# Patient Record
Sex: FEMALE | Race: OTHER | NOT HISPANIC OR LATINO | URBAN - METROPOLITAN AREA
[De-identification: names, ages, dates, MRNs, and addresses within clinical notes are randomized per-mention and may not be internally consistent; named-entity substitution may affect disease eponyms.]

---

## 2017-03-27 ENCOUNTER — EMERGENCY (EMERGENCY)
Facility: HOSPITAL | Age: 37
LOS: 0 days | Discharge: HOME | End: 2017-03-27
Admitting: INTERNAL MEDICINE

## 2017-06-27 DIAGNOSIS — M54.9 DORSALGIA, UNSPECIFIED: ICD-10-CM

## 2017-06-27 DIAGNOSIS — S09.90XA UNSPECIFIED INJURY OF HEAD, INITIAL ENCOUNTER: ICD-10-CM
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
## 2017-06-27 DIAGNOSIS — R10.12 LEFT UPPER QUADRANT PAIN: ICD-10-CM

## 2017-06-27 DIAGNOSIS — Z88.0 ALLERGY STATUS TO PENICILLIN: ICD-10-CM
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
## 2017-06-27 DIAGNOSIS — R42 DIZZINESS AND GIDDINESS: ICD-10-CM

## 2017-07-18 ENCOUNTER — EMERGENCY (EMERGENCY)
Facility: HOSPITAL | Age: 37
LOS: 0 days | Discharge: HOME | End: 2017-07-18
Admitting: INTERNAL MEDICINE

## 2017-07-18 DIAGNOSIS — R06.02 SHORTNESS OF BREATH: ICD-10-CM

## 2017-07-18 DIAGNOSIS — R53.83 OTHER FATIGUE: ICD-10-CM

## 2017-07-18 DIAGNOSIS — Z88.0 ALLERGY STATUS TO PENICILLIN: ICD-10-CM

## 2017-07-18 DIAGNOSIS — R14.0 ABDOMINAL DISTENSION (GASEOUS): ICD-10-CM

## 2017-09-27 ENCOUNTER — EMERGENCY (EMERGENCY)
Facility: HOSPITAL | Age: 37
LOS: 0 days | Discharge: HOME | End: 2017-09-27

## 2017-09-27 DIAGNOSIS — Z23 ENCOUNTER FOR IMMUNIZATION: ICD-10-CM
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
## 2017-09-27 DIAGNOSIS — T23.211A BURN OF SECOND DEGREE OF RIGHT THUMB (NAIL), INITIAL ENCOUNTER: ICD-10-CM
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
## 2017-09-27 DIAGNOSIS — Z88.0 ALLERGY STATUS TO PENICILLIN: ICD-10-CM

## 2017-09-27 DIAGNOSIS — T21.12XA BURN OF FIRST DEGREE OF ABDOMINAL WALL, INITIAL ENCOUNTER: ICD-10-CM
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
## 2017-09-27 DIAGNOSIS — T31.0 BURNS INVOLVING LESS THAN 10% OF BODY SURFACE: ICD-10-CM

## 2017-09-27 DIAGNOSIS — T24.112A BURN OF FIRST DEGREE OF LEFT THIGH, INITIAL ENCOUNTER: ICD-10-CM

## 2017-10-03 ENCOUNTER — APPOINTMENT (OUTPATIENT)
Age: 37
End: 2017-10-03

## 2017-10-03 ENCOUNTER — OUTPATIENT (OUTPATIENT)
Dept: OUTPATIENT SERVICES | Facility: HOSPITAL | Age: 37
LOS: 1 days | Discharge: HOME | End: 2017-10-03

## 2017-10-03 DIAGNOSIS — T23.211A BURN OF SECOND DEGREE OF RIGHT THUMB (NAIL), INITIAL ENCOUNTER: ICD-10-CM

## 2017-10-03 DIAGNOSIS — T24.212A BURN OF SECOND DEGREE OF LEFT THIGH, INITIAL ENCOUNTER: ICD-10-CM

## 2017-10-03 DIAGNOSIS — T21.22XA BURN OF SECOND DEGREE OF ABDOMINAL WALL, INITIAL ENCOUNTER: ICD-10-CM

## 2017-10-03 PROBLEM — Z00.00 ENCOUNTER FOR PREVENTIVE HEALTH EXAMINATION: Status: ACTIVE | Noted: 2017-10-03

## 2017-10-10 ENCOUNTER — APPOINTMENT (OUTPATIENT)
Age: 37
End: 2017-10-10

## 2017-10-10 DIAGNOSIS — T23.211A BURN OF SECOND DEGREE OF RIGHT THUMB (NAIL), INITIAL ENCOUNTER: ICD-10-CM

## 2017-10-10 DIAGNOSIS — T21.22XA BURN OF SECOND DEGREE OF ABDOMINAL WALL, INITIAL ENCOUNTER: ICD-10-CM
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
## 2017-10-12 ENCOUNTER — OUTPATIENT (OUTPATIENT)
Dept: OUTPATIENT SERVICES | Facility: HOSPITAL | Age: 37
LOS: 1 days | Discharge: HOME | End: 2017-10-12

## 2017-10-12 ENCOUNTER — APPOINTMENT (OUTPATIENT)
Age: 37
End: 2017-10-12

## 2017-10-24 DIAGNOSIS — T23.211D BURN OF SECOND DEGREE OF RIGHT THUMB (NAIL), SUBSEQUENT ENCOUNTER: ICD-10-CM

## 2017-10-24 DIAGNOSIS — T24.212D BURN OF SECOND DEGREE OF LEFT THIGH, SUBSEQUENT ENCOUNTER: ICD-10-CM

## 2017-10-24 DIAGNOSIS — T21.22XD BURN OF SECOND DEGREE OF ABDOMINAL WALL, SUBSEQUENT ENCOUNTER: ICD-10-CM
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
## 2018-07-22 ENCOUNTER — EMERGENCY (EMERGENCY)
Facility: HOSPITAL | Age: 38
LOS: 0 days | Discharge: HOME | End: 2018-07-22
Admitting: PHYSICIAN ASSISTANT

## 2018-07-22 VITALS
HEART RATE: 86 BPM | RESPIRATION RATE: 18 BRPM | TEMPERATURE: 99 F | DIASTOLIC BLOOD PRESSURE: 85 MMHG | SYSTOLIC BLOOD PRESSURE: 135 MMHG | OXYGEN SATURATION: 100 % | WEIGHT: 212.97 LBS
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
## 2018-07-22 DIAGNOSIS — Z88.0 ALLERGY STATUS TO PENICILLIN: ICD-10-CM
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
## 2018-07-22 DIAGNOSIS — T78.40XA ALLERGY, UNSPECIFIED, INITIAL ENCOUNTER: ICD-10-CM
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
## 2018-07-22 RX ORDER — DIPHENHYDRAMINE HCL 50 MG
2 CAPSULE ORAL
Qty: 6 | Refills: 0
Start: 2018-07-22 | End: 2018-07-24

## 2018-07-22 RX ORDER — FAMOTIDINE 10 MG/ML
40 INJECTION INTRAVENOUS
Qty: 0 | Refills: 0 | Status: DISCONTINUED | OUTPATIENT
Start: 2018-07-22 | End: 2018-07-22

## 2018-07-22 RX ORDER — FAMOTIDINE 10 MG/ML
1 INJECTION INTRAVENOUS
Qty: 4 | Refills: 0
Start: 2018-07-22 | End: 2018-07-25

## 2018-07-22 RX ORDER — DEXAMETHASONE 0.5 MG/5ML
10 ELIXIR ORAL ONCE
Qty: 0 | Refills: 0 | Status: COMPLETED | OUTPATIENT
Start: 2018-07-22 | End: 2018-07-22

## 2018-07-22 RX ORDER — DIPHENHYDRAMINE HCL 50 MG
50 CAPSULE ORAL EVERY 6 HOURS
Qty: 0 | Refills: 0 | Status: DISCONTINUED | OUTPATIENT
Start: 2018-07-22 | End: 2018-07-22

## 2018-07-22 RX ADMIN — FAMOTIDINE 40 MILLIGRAM(S): 10 INJECTION INTRAVENOUS at 22:21

## 2018-07-22 RX ADMIN — Medication 10 MILLIGRAM(S): at 22:20

## 2018-07-22 RX ADMIN — Medication 50 MILLIGRAM(S): at 22:22

## 2018-07-22 NOTE — ED PROVIDER NOTE - MEDICAL DECISION MAKING DETAILS
patient with rash likely allergic in nature no airway involvement close follow up with derm and allergist as an outpatient

## 2018-07-22 NOTE — ED ADULT NURSE NOTE - CAS EDN DISCHARGE ASSESSMENT
Alert and oriented to person, place and time/No adverse reaction to first time med in ED/Patient baseline mental status/Awake

## 2018-07-22 NOTE — ED PROVIDER NOTE - NS ED ROS FT
Review of Systems  Constitutional: (-) fever  Eyes/ENT: (-) blurry vision, (-) epistaxis  Cardiovascular: (-) chest pain, (-) syncope  Respiratory: (-) cough, (-) shortness of breath  Gastrointestinal: (-) vomiting, (-) diarrhea  Musculoskeletal: (-) neck pain, (-) back pain, (-) joint pain  Integumentary: (+) rash, (-) edema  Neurological: (-) headache, (-) altered mental status  Allergic/Immunologic: (+) pruritus

## 2018-07-22 NOTE — ED ADULT NURSE NOTE - CHPI ED SYMPTOMS NEG
no difficulty swallowing/no shortness of breath/no vomiting/no swelling of face, tongue/no throat itching/no difficulty breathing/no wheezing

## 2018-07-22 NOTE — ED PROVIDER NOTE - PHYSICAL EXAMINATION
Gen: Alert, NAD, well appearing  Head: NC, AT, PERRL, EOMI, normal lids/conjunctiva  ENT: normal hearing, patent oropharynx without erythema/exudate, uvula midline  Neck: +supple, no tenderness/meningismus/JVD, +Trachea midline  Pulm: Bilateral BS, normal resp effort, no wheeze/stridor/retractions  CV: RRR, no M/R/G, +dist pulses  Abd: soft, NT/ND  Skin: +erythematous patch like non vesicular rash to generalized body  Neuro: AAOx3

## 2018-07-22 NOTE — ED PROVIDER NOTE - OBJECTIVE STATEMENT
Patient is a 37-years old F presents to the ED for evaluation of rash to body since yesterday, no sick contact, no new medication, no fever/chills, no recent travel, no abx, no new soaps, no shortness of breath, no cp, no throat involvement.

## 2018-07-22 NOTE — ED ADULT NURSE NOTE - OBJECTIVE STATEMENT
Pt c/o generalized pruritic rash and hives since last night. (+) hx allergic reaction to PCN. Pt denies any exposure to penicillin or to any new meds, food or soap?lotion. Denies sob, denies tongue swelling. No wheezing. Pt took Allegra last night and states pruritus was relieved but rash/hives still persists. Denies fever/chills.

## 2018-07-24 ENCOUNTER — EMERGENCY (EMERGENCY)
Facility: HOSPITAL | Age: 38
LOS: 0 days | Discharge: HOME | End: 2018-07-24
Attending: EMERGENCY MEDICINE | Admitting: EMERGENCY MEDICINE

## 2018-07-24 VITALS
RESPIRATION RATE: 20 BRPM | DIASTOLIC BLOOD PRESSURE: 69 MMHG | HEART RATE: 83 BPM | TEMPERATURE: 97 F | OXYGEN SATURATION: 98 % | SYSTOLIC BLOOD PRESSURE: 131 MMHG | WEIGHT: 210.98 LBS | HEIGHT: 66 IN

## 2018-07-24 VITALS
TEMPERATURE: 98 F | RESPIRATION RATE: 20 BRPM | OXYGEN SATURATION: 99 % | SYSTOLIC BLOOD PRESSURE: 116 MMHG | HEART RATE: 99 BPM | DIASTOLIC BLOOD PRESSURE: 53 MMHG

## 2018-07-24 DIAGNOSIS — L50.9 URTICARIA, UNSPECIFIED: ICD-10-CM

## 2018-07-24 DIAGNOSIS — Z88.0 ALLERGY STATUS TO PENICILLIN: ICD-10-CM

## 2018-07-24 DIAGNOSIS — L50.0 ALLERGIC URTICARIA: ICD-10-CM

## 2018-07-24 DIAGNOSIS — Z79.899 OTHER LONG TERM (CURRENT) DRUG THERAPY: ICD-10-CM

## 2018-07-24 RX ORDER — FAMOTIDINE 10 MG/ML
1 INJECTION INTRAVENOUS
Qty: 8 | Refills: 0
Start: 2018-07-24 | End: 2018-07-27

## 2018-07-24 RX ORDER — FAMOTIDINE 10 MG/ML
20 INJECTION INTRAVENOUS ONCE
Qty: 0 | Refills: 0 | Status: COMPLETED | OUTPATIENT
Start: 2018-07-24 | End: 2018-07-24

## 2018-07-24 RX ORDER — SODIUM CHLORIDE 9 MG/ML
1000 INJECTION INTRAMUSCULAR; INTRAVENOUS; SUBCUTANEOUS ONCE
Qty: 0 | Refills: 0 | Status: COMPLETED | OUTPATIENT
Start: 2018-07-24 | End: 2018-07-24

## 2018-07-24 RX ORDER — EPINEPHRINE 0.3 MG/.3ML
0.3 INJECTION INTRAMUSCULAR; SUBCUTANEOUS ONCE
Qty: 0 | Refills: 0 | Status: COMPLETED | OUTPATIENT
Start: 2018-07-24 | End: 2018-07-24

## 2018-07-24 RX ORDER — DIPHENHYDRAMINE HCL 50 MG
1 CAPSULE ORAL
Qty: 16 | Refills: 0
Start: 2018-07-24 | End: 2018-07-27

## 2018-07-24 RX ORDER — SODIUM CHLORIDE 9 MG/ML
3 INJECTION INTRAMUSCULAR; INTRAVENOUS; SUBCUTANEOUS EVERY 8 HOURS
Qty: 0 | Refills: 0 | Status: DISCONTINUED | OUTPATIENT
Start: 2018-07-24 | End: 2018-07-24

## 2018-07-24 RX ORDER — EPINEPHRINE 0.3 MG/.3ML
0.3 INJECTION INTRAMUSCULAR; SUBCUTANEOUS
Qty: 0.6 | Refills: 0
Start: 2018-07-24 | End: 2018-07-24

## 2018-07-24 RX ADMIN — FAMOTIDINE 100 MILLIGRAM(S): 10 INJECTION INTRAVENOUS at 02:32

## 2018-07-24 RX ADMIN — EPINEPHRINE 0.3 MILLIGRAM(S): 0.3 INJECTION INTRAMUSCULAR; SUBCUTANEOUS at 03:55

## 2018-07-24 RX ADMIN — SODIUM CHLORIDE 3 MILLILITER(S): 9 INJECTION INTRAMUSCULAR; INTRAVENOUS; SUBCUTANEOUS at 02:28

## 2018-07-24 RX ADMIN — SODIUM CHLORIDE 1000 MILLILITER(S): 9 INJECTION INTRAMUSCULAR; INTRAVENOUS; SUBCUTANEOUS at 02:32

## 2018-07-24 RX ADMIN — Medication 125 MILLIGRAM(S): at 02:32

## 2018-07-24 NOTE — ED ADULT NURSE NOTE - CHIEF COMPLAINT QUOTE
pt states seen 2 days ago at hospital for hives and given benadryl and pepcid and seen again tonight at [facility] 4 hours ago but still has hives

## 2018-07-24 NOTE — ED PROVIDER NOTE - PHYSICAL EXAMINATION
CONST: Well appearing in NAD  EYES: Sclera and conjunctiva clear.  ENT: + mild erythema of pharynx, airway patent, normal architecture of posterior pharynx, uvula midline, No nasal discharge. TM's clear B/L without drainage.   NECK: Non-tender, supple  CARD: Normal S1 S2; Normal rate and rhythm  RESP: Equal BS B/L, No wheezes, rhonchi or rales. No distress  GI: Soft, non-tender, non-distended.  MS: Normal ROM in all extremities. no TTP of extremities, pedal pulses 2+ bilaterally  SKIN: diffuse maculopapular rash of torso and extremities,   NEURO: A&Ox3, Strength 5/5 with no sensory deficits. Steady gait

## 2018-07-24 NOTE — ED PROVIDER NOTE - NS ED ROS FT
CONST: No fever, chills or body aches  EYES: No pain, redness, drainage or visual changes.  ENT: No ear pain or discharge, nasal discharge or congestion. No sore throat  CARD: No chest pain, palpitations  RESP: No SOB, cough  GI: + nausea. No abdominal pain, V/D  MS: No joint pain, back pain or extremity pain/injury  SKIN: + hives   NEURO: No headache, dizziness, paresthesias

## 2018-07-24 NOTE — ED PROVIDER NOTE - MEDICAL DECISION MAKING DETAILS
pt with  hives  throat swelling sensation and nausea - epi given  with resolution of symptoms-  pt has allergist appointment

## 2018-07-24 NOTE — ED PROVIDER NOTE - PROGRESS NOTE DETAILS
Pt feels improved after epi.  rash improved. pt stable.  requesting to go home.  Improvement with epi.  Will d/c on benadryl, pepcid and send home with epi pen.  discussed indications for use of epipen.  All questions were answered and return precautions discussed.  Will follow up with PMD.  NO further concerns. Pt/family understand and agree with tx plan. I was directly involved in the care of this patient. PA Fellow Florentin note/plan reviewed and agreed.

## 2018-07-24 NOTE — ED PROVIDER NOTE - OBJECTIVE STATEMENT
37 y.o female with hx of PE presents to the ED for evaluation of hives x 3 days.  Pt developed allergic reaction with development of abdomen and back 37 y.o female with hx of PE presents to the ED for evaluation of hives x 3 days.  Pt developed allergic reaction with development of hives on abdomen and back which progressed to extremities.  Was seen at [facility] and given decadron, Pepcid and Benadryl with relief of her sxs.  The following day rash worsened prompting visit to [facility] where pt was given benadryl IM and "5 pills," with relief of sxs.  Today pt noticed worsening of hives and nausea prompting visit to the ED.  Took 50 mg Benadryl prior to arrival.  Denies vomiting, sore throat, dyspnea, chest pain, extremity pain, abdominal pain.

## 2018-07-24 NOTE — ED ADULT TRIAGE NOTE - CHIEF COMPLAINT QUOTE
pt states seen 2 days ago at hospital for hives and given benadryl and pepcid and seen again tonight at [facility] 4 hours ago but still has hives

## 2018-07-26 NOTE — ED ADULT NURSE REASSESSMENT NOTE - NS ED NURSE REASSESS RESPONSE TO CARE
patient states "ready to go home"/feeling much better chlorhexidine/Adherence to aseptic technique: hand hygiene prior to donning barriers (gown, gloves), don cap and mask, sterile drape over patient

## 2018-12-25 ENCOUNTER — TRANSCRIPTION ENCOUNTER (OUTPATIENT)
Age: 38
End: 2018-12-25

## 2019-02-28 ENCOUNTER — EMERGENCY (EMERGENCY)
Facility: HOSPITAL | Age: 39
LOS: 0 days | Discharge: HOME | End: 2019-02-28
Attending: EMERGENCY MEDICINE | Admitting: EMERGENCY MEDICINE

## 2019-02-28 ENCOUNTER — TRANSCRIPTION ENCOUNTER (OUTPATIENT)
Age: 39
End: 2019-02-28

## 2019-02-28 VITALS
HEIGHT: 66 IN | SYSTOLIC BLOOD PRESSURE: 119 MMHG | WEIGHT: 218.04 LBS | OXYGEN SATURATION: 100 % | RESPIRATION RATE: 16 BRPM | HEART RATE: 80 BPM | DIASTOLIC BLOOD PRESSURE: 59 MMHG | TEMPERATURE: 98 F

## 2019-02-28 VITALS
HEART RATE: 74 BPM | TEMPERATURE: 97 F | SYSTOLIC BLOOD PRESSURE: 126 MMHG | DIASTOLIC BLOOD PRESSURE: 80 MMHG | OXYGEN SATURATION: 100 % | RESPIRATION RATE: 20 BRPM

## 2019-02-28 DIAGNOSIS — R10.9 UNSPECIFIED ABDOMINAL PAIN: ICD-10-CM

## 2019-02-28 DIAGNOSIS — Z90.49 ACQUIRED ABSENCE OF OTHER SPECIFIED PARTS OF DIGESTIVE TRACT: Chronic | ICD-10-CM

## 2019-02-28 DIAGNOSIS — Z98.891 HISTORY OF UTERINE SCAR FROM PREVIOUS SURGERY: Chronic | ICD-10-CM

## 2019-02-28 DIAGNOSIS — Z88.0 ALLERGY STATUS TO PENICILLIN: ICD-10-CM

## 2019-02-28 DIAGNOSIS — R10.84 GENERALIZED ABDOMINAL PAIN: ICD-10-CM

## 2019-02-28 DIAGNOSIS — R11.2 NAUSEA WITH VOMITING, UNSPECIFIED: ICD-10-CM

## 2019-02-28 DIAGNOSIS — R19.7 DIARRHEA, UNSPECIFIED: ICD-10-CM

## 2019-02-28 DIAGNOSIS — Z90.49 ACQUIRED ABSENCE OF OTHER SPECIFIED PARTS OF DIGESTIVE TRACT: ICD-10-CM

## 2019-02-28 DIAGNOSIS — Z79.899 OTHER LONG TERM (CURRENT) DRUG THERAPY: ICD-10-CM

## 2019-02-28 DIAGNOSIS — Z98.891 HISTORY OF UTERINE SCAR FROM PREVIOUS SURGERY: ICD-10-CM

## 2019-02-28 LAB
ALBUMIN SERPL ELPH-MCNC: 4.6 G/DL — SIGNIFICANT CHANGE UP (ref 3.5–5.2)
ALP SERPL-CCNC: 73 U/L — SIGNIFICANT CHANGE UP (ref 30–115)
ALT FLD-CCNC: 14 U/L — SIGNIFICANT CHANGE UP (ref 0–41)
ANION GAP SERPL CALC-SCNC: 14 MMOL/L — SIGNIFICANT CHANGE UP (ref 7–14)
APPEARANCE UR: CLEAR — SIGNIFICANT CHANGE UP
AST SERPL-CCNC: 18 U/L — SIGNIFICANT CHANGE UP (ref 0–41)
BACTERIA # UR AUTO: SIGNIFICANT CHANGE UP /HPF
BASOPHILS # BLD AUTO: 0.01 K/UL — SIGNIFICANT CHANGE UP (ref 0–0.2)
BASOPHILS NFR BLD AUTO: 0.2 % — SIGNIFICANT CHANGE UP (ref 0–1)
BILIRUB SERPL-MCNC: 0.4 MG/DL — SIGNIFICANT CHANGE UP (ref 0.2–1.2)
BILIRUB UR-MCNC: NEGATIVE — SIGNIFICANT CHANGE UP
BUN SERPL-MCNC: 18 MG/DL — SIGNIFICANT CHANGE UP (ref 10–20)
CALCIUM SERPL-MCNC: 9.3 MG/DL — SIGNIFICANT CHANGE UP (ref 8.5–10.1)
CHLORIDE SERPL-SCNC: 103 MMOL/L — SIGNIFICANT CHANGE UP (ref 98–110)
CO2 SERPL-SCNC: 22 MMOL/L — SIGNIFICANT CHANGE UP (ref 17–32)
COLOR SPEC: YELLOW — SIGNIFICANT CHANGE UP
CREAT SERPL-MCNC: 0.7 MG/DL — SIGNIFICANT CHANGE UP (ref 0.7–1.5)
DIFF PNL FLD: ABNORMAL
EOSINOPHIL # BLD AUTO: 0.12 K/UL — SIGNIFICANT CHANGE UP (ref 0–0.7)
EOSINOPHIL NFR BLD AUTO: 1.9 % — SIGNIFICANT CHANGE UP (ref 0–8)
EPI CELLS # UR: ABNORMAL /HPF
GLUCOSE SERPL-MCNC: 87 MG/DL — SIGNIFICANT CHANGE UP (ref 70–99)
GLUCOSE UR QL: NEGATIVE MG/DL — SIGNIFICANT CHANGE UP
HCT VFR BLD CALC: 42 % — SIGNIFICANT CHANGE UP (ref 37–47)
HGB BLD-MCNC: 13.7 G/DL — SIGNIFICANT CHANGE UP (ref 12–16)
IMM GRANULOCYTES NFR BLD AUTO: 0.5 % — HIGH (ref 0.1–0.3)
KETONES UR-MCNC: NEGATIVE — SIGNIFICANT CHANGE UP
LEUKOCYTE ESTERASE UR-ACNC: NEGATIVE — SIGNIFICANT CHANGE UP
LIDOCAIN IGE QN: 18 U/L — SIGNIFICANT CHANGE UP (ref 7–60)
LYMPHOCYTES # BLD AUTO: 1.09 K/UL — LOW (ref 1.2–3.4)
LYMPHOCYTES # BLD AUTO: 17.7 % — LOW (ref 20.5–51.1)
MAGNESIUM SERPL-MCNC: 1.8 MG/DL — SIGNIFICANT CHANGE UP (ref 1.8–2.4)
MCHC RBC-ENTMCNC: 29.5 PG — SIGNIFICANT CHANGE UP (ref 27–31)
MCHC RBC-ENTMCNC: 32.6 G/DL — SIGNIFICANT CHANGE UP (ref 32–37)
MCV RBC AUTO: 90.5 FL — SIGNIFICANT CHANGE UP (ref 81–99)
MONOCYTES # BLD AUTO: 0.41 K/UL — SIGNIFICANT CHANGE UP (ref 0.1–0.6)
MONOCYTES NFR BLD AUTO: 6.7 % — SIGNIFICANT CHANGE UP (ref 1.7–9.3)
NEUTROPHILS # BLD AUTO: 4.5 K/UL — SIGNIFICANT CHANGE UP (ref 1.4–6.5)
NEUTROPHILS NFR BLD AUTO: 73 % — SIGNIFICANT CHANGE UP (ref 42.2–75.2)
NITRITE UR-MCNC: NEGATIVE — SIGNIFICANT CHANGE UP
NRBC # BLD: 0 /100 WBCS — SIGNIFICANT CHANGE UP (ref 0–0)
PH UR: 5.5 — SIGNIFICANT CHANGE UP (ref 5–8)
PLATELET # BLD AUTO: 260 K/UL — SIGNIFICANT CHANGE UP (ref 130–400)
POTASSIUM SERPL-MCNC: 4.2 MMOL/L — SIGNIFICANT CHANGE UP (ref 3.5–5)
POTASSIUM SERPL-SCNC: 4.2 MMOL/L — SIGNIFICANT CHANGE UP (ref 3.5–5)
PROT SERPL-MCNC: 7.6 G/DL — SIGNIFICANT CHANGE UP (ref 6–8)
PROT UR-MCNC: NEGATIVE MG/DL — SIGNIFICANT CHANGE UP
RBC # BLD: 4.64 M/UL — SIGNIFICANT CHANGE UP (ref 4.2–5.4)
RBC # FLD: 12.7 % — SIGNIFICANT CHANGE UP (ref 11.5–14.5)
RBC CASTS # UR COMP ASSIST: ABNORMAL /HPF
SODIUM SERPL-SCNC: 139 MMOL/L — SIGNIFICANT CHANGE UP (ref 135–146)
SP GR SPEC: 1.02 — SIGNIFICANT CHANGE UP (ref 1.01–1.03)
UROBILINOGEN FLD QL: 0.2 MG/DL — SIGNIFICANT CHANGE UP (ref 0.2–0.2)
WBC # BLD: 6.16 K/UL — SIGNIFICANT CHANGE UP (ref 4.8–10.8)
WBC # FLD AUTO: 6.16 K/UL — SIGNIFICANT CHANGE UP (ref 4.8–10.8)
WBC UR QL: SIGNIFICANT CHANGE UP /HPF

## 2019-02-28 RX ORDER — SODIUM CHLORIDE 9 MG/ML
1000 INJECTION INTRAMUSCULAR; INTRAVENOUS; SUBCUTANEOUS ONCE
Qty: 0 | Refills: 0 | Status: COMPLETED | OUTPATIENT
Start: 2019-02-28 | End: 2019-02-28

## 2019-02-28 RX ORDER — FAMOTIDINE 10 MG/ML
20 INJECTION INTRAVENOUS ONCE
Qty: 0 | Refills: 0 | Status: COMPLETED | OUTPATIENT
Start: 2019-02-28 | End: 2019-02-28

## 2019-02-28 RX ADMIN — Medication 20 MILLIGRAM(S): at 20:08

## 2019-02-28 RX ADMIN — FAMOTIDINE 20 MILLIGRAM(S): 10 INJECTION INTRAVENOUS at 18:01

## 2019-02-28 RX ADMIN — SODIUM CHLORIDE 2000 MILLILITER(S): 9 INJECTION INTRAMUSCULAR; INTRAVENOUS; SUBCUTANEOUS at 17:53

## 2019-02-28 NOTE — ED PROVIDER NOTE - ATTENDING CONTRIBUTION TO CARE
I personally evaluated the patient. I reviewed the Resident’s or Physician Assistant’s note (as assigned above), and agree with the findings and plan except as documented in my note.  A 39 y/o f, reports not on any medications, sig. history of diverticulitis, does not have gi physician, his sharp in nature, was general but now localized to LUQ, associated with nausea and vomiting x1 today, nbnb, and diarrhea ~ 4 episodes, non-bloody. son is sick with similar symptoms. lmp February 9th. had flu swab done at urgent care that was negative and was given zofran there which helped her nausea. No fever, chills, cp,  pleuritic cp, sob, palpitations, diaphoresis, cough, ha/lh/dizziness, numbness/tingling, neck pain/ stiffness, constipation, melena/brbpr, urinary symptoms, trauma, weakness, edema, calf pain/swelling/erythema, recent travel or rash.  Vital Signs: I have reviewed the initial vital signs. Constitutional: WDWN in nad. Sitting on stretcher speaking full sentences. Integumentary: No rash. ENT: MMM NECK: Supple, non-tender, no meningeal signs. Cardiovascular: RRR, radial pulses 2/4 b/l. No JVD. Respiratory: BS present b/l, ctabl, no wheezing or crackles, good resp effort and excursion, good air exchange, no accessory muscle use, no stridor. Gastrointestinal: BS present throughout all 4 quadrants, soft, nd, tenderness to palpation to LUQ, no rebound tenderness or guarding, no cvat. (-) Gifford's. Musculoskeletal: FROM, no edema, no calf pain/swelling/erythema. Neurologic: AAOx3, motor 5/5 and sensation intact throughout upper and lowe ext, CN II-XII intact, No facial droop or slurring of speech. No focal deficits.

## 2019-02-28 NOTE — ED PROVIDER NOTE - CARE PLAN
Assessment and plan of treatment:	Plan: Labs, ivf, pt took zofran pta at urgent care, pepcid, u preg, urine, imaging, reassess. Principal Discharge DX:	Abdominal pain  Assessment and plan of treatment:	Plan: Labs, ivf, pt took zofran pta at urgent care, pepcid, u preg, urine, imaging, reassess. Principal Discharge DX:	Abdominal pain  Assessment and plan of treatment:	Plan: Labs, ivf, pt took zofran pta at urgent care, pepcid, u preg, urine, imaging, reassess.  Secondary Diagnosis:	Nausea and vomiting  Secondary Diagnosis:	Diarrhea

## 2019-02-28 NOTE — ED PROVIDER NOTE - CARE PROVIDER_API CALL
Sukhwinder Hobbs)  Gastroenterology  [address] [place] [address]  Phone: [phone]  Fax: [phone]  Follow Up Time:

## 2019-02-28 NOTE — ED PROVIDER NOTE - RESPIRATORY NEGATIVE STATEMENT, MLM
Mri being done today downstairs  
Noted. Will follow up with results.  
no chest pain, no cough, and no shortness of breath.

## 2019-02-28 NOTE — ED PROVIDER NOTE - CLINICAL SUMMARY MEDICAL DECISION MAKING FREE TEXT BOX
pt aware of all lab and imaging, copy given, feeling better, no symptoms at this time, no vomiting or diarrhea in ed, no current abd pain, aware of signs and symptoms to return for, reports will follow up with pmd and gi as discussed. bentyl prescription given as well.

## 2019-02-28 NOTE — ED PROVIDER NOTE - OBJECTIVE STATEMENT
39 y/o female with hx of diverticulitis one month ago and was taking cipro and flagyl presents with 2 day hx of abdominal pain and diarrhea. patient denies fever,chills. patient son with similar symptoms. patient denies any dysuria, back pain, vomiting . no chest pain, sob

## 2019-02-28 NOTE — ED ADULT NURSE NOTE - NSIMPLEMENTINTERV_GEN_ALL_ED
Implemented All Universal Safety Interventions:  [place] to call system. Call bell, personal items and telephone within reach. Instruct patient to call for assistance. Room bathroom lighting operational. Non-slip footwear when patient is off stretcher. Physically safe environment: no spills, clutter or unnecessary equipment. Stretcher in lowest position, wheels locked, appropriate side rails in place.

## 2019-02-28 NOTE — ED PROVIDER NOTE - PROGRESS NOTE DETAILS
spoke with patient and family regarding results of diagnostics . will rx bentyl and refer to GI for followup pt reports feeling much better, tolerated po, abd re exam: soft ntnd, no r/g, no cvat, states nos ymptoms at thist laura, all results reviewed and understood, aware of signs and symptoms to return for, follow up with pmd and gi, as well as proper hydration reports, will follow up.

## 2019-03-01 PROBLEM — I26.99 OTHER PULMONARY EMBOLISM WITHOUT ACUTE COR PULMONALE: Chronic | Status: ACTIVE | Noted: 2018-07-24

## 2019-03-01 LAB
CULTURE RESULTS: SIGNIFICANT CHANGE UP
SPECIMEN SOURCE: SIGNIFICANT CHANGE UP

## 2019-06-17 ENCOUNTER — TRANSCRIPTION ENCOUNTER (OUTPATIENT)
Age: 39
End: 2019-06-17

## 2019-06-28 ENCOUNTER — EMERGENCY (EMERGENCY)
Facility: HOSPITAL | Age: 39
LOS: 0 days | Discharge: HOME | End: 2019-06-28
Attending: EMERGENCY MEDICINE | Admitting: EMERGENCY MEDICINE
Payer: MEDICAID

## 2019-06-28 VITALS
SYSTOLIC BLOOD PRESSURE: 122 MMHG | TEMPERATURE: 98 F | OXYGEN SATURATION: 100 % | HEIGHT: 66 IN | RESPIRATION RATE: 18 BRPM | HEART RATE: 72 BPM | WEIGHT: 216.05 LBS | DIASTOLIC BLOOD PRESSURE: 82 MMHG

## 2019-06-28 VITALS
TEMPERATURE: 97 F | SYSTOLIC BLOOD PRESSURE: 106 MMHG | HEART RATE: 60 BPM | DIASTOLIC BLOOD PRESSURE: 62 MMHG | RESPIRATION RATE: 18 BRPM | OXYGEN SATURATION: 100 %

## 2019-06-28 DIAGNOSIS — Z98.891 HISTORY OF UTERINE SCAR FROM PREVIOUS SURGERY: Chronic | ICD-10-CM

## 2019-06-28 DIAGNOSIS — Z90.49 ACQUIRED ABSENCE OF OTHER SPECIFIED PARTS OF DIGESTIVE TRACT: Chronic | ICD-10-CM

## 2019-06-28 DIAGNOSIS — M79.604 PAIN IN RIGHT LEG: ICD-10-CM

## 2019-06-28 DIAGNOSIS — R06.02 SHORTNESS OF BREATH: ICD-10-CM

## 2019-06-28 DIAGNOSIS — Z86.711 PERSONAL HISTORY OF PULMONARY EMBOLISM: ICD-10-CM

## 2019-06-28 DIAGNOSIS — R60.0 LOCALIZED EDEMA: ICD-10-CM

## 2019-06-28 DIAGNOSIS — Z86.718 PERSONAL HISTORY OF OTHER VENOUS THROMBOSIS AND EMBOLISM: ICD-10-CM

## 2019-06-28 DIAGNOSIS — R07.9 CHEST PAIN, UNSPECIFIED: ICD-10-CM

## 2019-06-28 PROBLEM — K57.92 DIVERTICULITIS OF INTESTINE, PART UNSPECIFIED, WITHOUT PERFORATION OR ABSCESS WITHOUT BLEEDING: Chronic | Status: ACTIVE | Noted: 2019-02-28

## 2019-06-28 LAB
ALBUMIN SERPL ELPH-MCNC: 4.3 G/DL — SIGNIFICANT CHANGE UP (ref 3.5–5.2)
ALP SERPL-CCNC: 64 U/L — SIGNIFICANT CHANGE UP (ref 30–115)
ALT FLD-CCNC: 13 U/L — SIGNIFICANT CHANGE UP (ref 0–41)
ANION GAP SERPL CALC-SCNC: 12 MMOL/L — SIGNIFICANT CHANGE UP (ref 7–14)
APPEARANCE UR: CLEAR — SIGNIFICANT CHANGE UP
AST SERPL-CCNC: 16 U/L — SIGNIFICANT CHANGE UP (ref 0–41)
BILIRUB SERPL-MCNC: 0.2 MG/DL — SIGNIFICANT CHANGE UP (ref 0.2–1.2)
BILIRUB UR-MCNC: NEGATIVE — SIGNIFICANT CHANGE UP
BUN SERPL-MCNC: 11 MG/DL — SIGNIFICANT CHANGE UP (ref 10–20)
CALCIUM SERPL-MCNC: 9.2 MG/DL — SIGNIFICANT CHANGE UP (ref 8.5–10.1)
CHLORIDE SERPL-SCNC: 101 MMOL/L — SIGNIFICANT CHANGE UP (ref 98–110)
CO2 SERPL-SCNC: 26 MMOL/L — SIGNIFICANT CHANGE UP (ref 17–32)
COLOR SPEC: YELLOW — SIGNIFICANT CHANGE UP
CREAT SERPL-MCNC: 0.7 MG/DL — SIGNIFICANT CHANGE UP (ref 0.7–1.5)
D DIMER BLD IA.RAPID-MCNC: 337 NG/ML DDU — HIGH (ref 0–230)
DIFF PNL FLD: NEGATIVE — SIGNIFICANT CHANGE UP
GLUCOSE SERPL-MCNC: 89 MG/DL — SIGNIFICANT CHANGE UP (ref 70–99)
GLUCOSE UR QL: NEGATIVE MG/DL — SIGNIFICANT CHANGE UP
HCG SERPL QL: NEGATIVE — SIGNIFICANT CHANGE UP
HCT VFR BLD CALC: 37.7 % — SIGNIFICANT CHANGE UP (ref 37–47)
HGB BLD-MCNC: 12.4 G/DL — SIGNIFICANT CHANGE UP (ref 12–16)
KETONES UR-MCNC: NEGATIVE — SIGNIFICANT CHANGE UP
LEUKOCYTE ESTERASE UR-ACNC: NEGATIVE — SIGNIFICANT CHANGE UP
MCHC RBC-ENTMCNC: 29.5 PG — SIGNIFICANT CHANGE UP (ref 27–31)
MCHC RBC-ENTMCNC: 32.9 G/DL — SIGNIFICANT CHANGE UP (ref 32–37)
MCV RBC AUTO: 89.8 FL — SIGNIFICANT CHANGE UP (ref 81–99)
NITRITE UR-MCNC: NEGATIVE — SIGNIFICANT CHANGE UP
NRBC # BLD: 0 /100 WBCS — SIGNIFICANT CHANGE UP (ref 0–0)
NT-PROBNP SERPL-SCNC: 20 PG/ML — SIGNIFICANT CHANGE UP (ref 0–300)
PH UR: 5.5 — SIGNIFICANT CHANGE UP (ref 5–8)
PLATELET # BLD AUTO: 200 K/UL — SIGNIFICANT CHANGE UP (ref 130–400)
POTASSIUM SERPL-MCNC: 3.9 MMOL/L — SIGNIFICANT CHANGE UP (ref 3.5–5)
POTASSIUM SERPL-SCNC: 3.9 MMOL/L — SIGNIFICANT CHANGE UP (ref 3.5–5)
PROT SERPL-MCNC: 7.2 G/DL — SIGNIFICANT CHANGE UP (ref 6–8)
PROT UR-MCNC: NEGATIVE MG/DL — SIGNIFICANT CHANGE UP
RBC # BLD: 4.2 M/UL — SIGNIFICANT CHANGE UP (ref 4.2–5.4)
RBC # FLD: 12.5 % — SIGNIFICANT CHANGE UP (ref 11.5–14.5)
SODIUM SERPL-SCNC: 139 MMOL/L — SIGNIFICANT CHANGE UP (ref 135–146)
SP GR SPEC: 1.01 — SIGNIFICANT CHANGE UP (ref 1.01–1.03)
TROPONIN T SERPL-MCNC: <0.01 NG/ML — SIGNIFICANT CHANGE UP
UROBILINOGEN FLD QL: 0.2 MG/DL — SIGNIFICANT CHANGE UP (ref 0.2–0.2)
WBC # BLD: 6.84 K/UL — SIGNIFICANT CHANGE UP (ref 4.8–10.8)
WBC # FLD AUTO: 6.84 K/UL — SIGNIFICANT CHANGE UP (ref 4.8–10.8)

## 2019-06-28 PROCEDURE — 71046 X-RAY EXAM CHEST 2 VIEWS: CPT | Mod: 26

## 2019-06-28 PROCEDURE — 73610 X-RAY EXAM OF ANKLE: CPT | Mod: 26,LT

## 2019-06-28 PROCEDURE — 99285 EMERGENCY DEPT VISIT HI MDM: CPT

## 2019-06-28 PROCEDURE — 71275 CT ANGIOGRAPHY CHEST: CPT | Mod: 26

## 2019-06-28 PROCEDURE — 93970 EXTREMITY STUDY: CPT | Mod: 26

## 2019-06-28 RX ORDER — KETOROLAC TROMETHAMINE 30 MG/ML
15 SYRINGE (ML) INJECTION ONCE
Refills: 0 | Status: DISCONTINUED | OUTPATIENT
Start: 2019-06-28 | End: 2019-06-28

## 2019-06-28 RX ADMIN — Medication 15 MILLIGRAM(S): at 07:00

## 2019-06-28 RX ADMIN — Medication 15 MILLIGRAM(S): at 05:53

## 2019-06-28 RX ADMIN — Medication 15 MILLIGRAM(S): at 09:15

## 2019-06-28 RX ADMIN — Medication 15 MILLIGRAM(S): at 09:00

## 2019-06-28 NOTE — ED CDU PROVIDER INITIAL DAY NOTE - PHYSICAL EXAMINATION
CONSTITUTIONAL: Well-developed; well-nourished; in no acute distress.   SKIN: warm, dry  HEAD: Normocephalic; atraumatic.  EYES: PERRL, EOMI, no conjunctival erythema  ENT: No nasal discharge; airway clear.  NECK: Supple; non tender.  CARD: S1, S2 normal; no murmurs, gallops, or rubs. Regular rate and rhythm.   RESP: No wheezes, rales or rhonchi.  ABD: soft ntnd  EXT: Normal ROM.  No clubbing, cyanosis. Mild edema over L. medial malleolus with no tenderness.  LYMPH: No acute cervical adenopathy.  NEURO: Alert, oriented, grossly unremarkable  PSYCH: Cooperative, appropriate.

## 2019-06-28 NOTE — ED PROVIDER NOTE - CLINICAL SUMMARY MEDICAL DECISION MAKING FREE TEXT BOX
Pt pending DVT study. Discussed empiric a/c and outptatient dvt vs observation for am dvt study. amenable to observation,.

## 2019-06-28 NOTE — ED CDU PROVIDER INITIAL DAY NOTE - NS ED ROS FT
Eyes:  No visual changes, eye pain or discharge.  ENMT:  No hearing changes, pain, no sore throat or runny nose, no difficulty swallowing  Cardiac:  No edema. No chest pain with exertion. + chest pressure, SOB, edema  Respiratory:  No cough or respiratory distress. No hemoptysis. No history of asthma or RAD.  GI:  No nausea, vomiting, diarrhea or abdominal pain.  :  No dysuria, frequency or burning.  MS:  No myalgia, muscle weakness, joint pain or back pain.  Neuro:  No headache or weakness.  No LOC.  Skin:  No skin rash.   Endocrine: No history of thyroid disease or diabetes.

## 2019-06-28 NOTE — ED CDU PROVIDER DISPOSITION NOTE - CARE PROVIDERS DIRECT ADDRESSES
,DirectAddress_Unknown,[email or web address]

## 2019-06-28 NOTE — ED CDU PROVIDER DISPOSITION NOTE - CLINICAL COURSE
Pt came in with PLE swelling. labs/CT/US done and reviewed. No PE/DVT. WIll discharge with PMD follow up.

## 2019-06-28 NOTE — ED CDU PROVIDER DISPOSITION NOTE - PROVIDER TOKENS
PROVIDER:[TOKEN:[[id number]],FOLLOWUP:[1-3 Days]],PROVIDER:[TOKEN:[[id number]],FOLLOWUP:[1-3 Days]]

## 2019-06-28 NOTE — ED CDU PROVIDER DISPOSITION NOTE - NSFOLLOWUPINSTRUCTIONS_ED_ALL_ED_FT
Follow up with your primary care doctor and lung doctor in 1-2 days    Shortness of Breath    WHAT YOU NEED TO KNOW:    Shortness of breath is a feeling that you cannot get enough air when you breathe in. You may have this feeling only during activity, or all the time. Your symptoms can range from mild to severe. Shortness of breath may be a sign of a serious health condition that needs immediate care.    DISCHARGE INSTRUCTIONS:    Return to the emergency department if:     Your signs and symptoms are the same or worse within 24 hours of treatment.       The skin over your ribs or on your neck sinks in when you breathe.       You feel confused or dizzy.    Contact your healthcare provider if:     You have new or worsening symptoms.      You have questions or concerns about your condition or care.    Medicines:     Medicines may be used to treat the cause of your symptoms. You may need medicine to treat a bacterial infection or reduce anxiety. Other medicines may be used to open your airway, reduce swelling, or remove extra fluid. If you have a heart condition, you may need medicine to help your heart beat more strongly or regularly.      Take your medicine as directed. Contact your healthcare provider if you think your medicine is not helping or if you have side effects. Tell him or her if you are allergic to any medicine. Keep a list of the medicines, vitamins, and herbs you take. Include the amounts, and when and why you take them. Bring the list or the pill bottles to follow-up visits. Carry your medicine list with you in case of an emergency.    Manage shortness of breath:     Create an action plan. You and your healthcare provider can work together to create a plan for how to handle shortness of breath. The plan can include daily activities, treatment changes, and what to do if you have severe breathing problems.      Lean forward on your elbows when you sit. This helps your lungs expand and may make it easier to breathe.      Use pursed-lip breathing any time you feel short of breath. Breathe in through your nose and then slowly breathe out through your mouth with your lips slightly puckered. It should take you twice as long to breathe out as it did to breathe in.Breathe in Breathe out           Do not smoke. Nicotine and other chemicals in cigarettes and cigars can cause lung damage and make shortness of breath worse. Ask your healthcare provider for information if you currently smoke and need help to quit. E-cigarettes or smokeless tobacco still contain nicotine. Talk to your healthcare provider before you use these products.      Reach or maintain a healthy weight. Your healthcare provider can help you create a safe weight loss plan if you are overweight.      Exercise as directed. Exercise can help your lungs work more easily. Exercise can also help you lose weight if needed. Try to get at least 30 minutes of exercise most days of the week.    Follow up with your healthcare provider or specialist as directed: Write down your questions so you remember to ask them during your visits.       © Copyright [facility] 2019 All illustrations and images included in CareNotes are the copyrighted property of [employer].D.A[employer]M., Inc[employer] or [employer].

## 2019-06-28 NOTE — ED CDU PROVIDER INITIAL DAY NOTE - PROGRESS NOTE DETAILS
Pt feeling better states still having some pain in chest but states that the pain improved after tordol last night requesting additional dose. Results d/w patient and family and copies of results provided.  Pt instructed to return if any worsening symptoms or concerns.  Discussed with patient follow up and care plan. They verbalize understanding all discussed and all questions answered..

## 2019-06-28 NOTE — ED PROVIDER NOTE - PHYSICAL EXAMINATION
CONSTITUTIONAL: Well-developed; well-nourished; anxious appearing.  SKIN: warm, dry  HEAD: Normocephalic; atraumatic.  EYES: PERRL, EOMI, no conjunctival erythema  ENT: No nasal discharge; airway clear.  NECK: Supple; non tender.  CARD: S1, S2 normal; no murmurs, gallops, or rubs. Regular rate and rhythm. 2+ radial/pedal pulses B/L.   RESP: No wheezes, rales or rhonchi.  ABD: soft ntnd  EXT: Normal ROM.  No clubbing, cyanosis. Mild edema over L. medial malleolus with no tenderness.  LYMPH: No acute cervical adenopathy.  NEURO: Alert, oriented, grossly unremarkable  PSYCH: Cooperative, appropriate.

## 2019-06-28 NOTE — ED PROVIDER NOTE - NS ED ROS FT
Eyes:  No visual changes, eye pain or discharge.  ENMT:  No hearing changes, pain, no sore throat or runny nose, no difficulty swallowing  Cardiac:  No chest pain with exertion. + chest pressure, SOB, edema  Respiratory:  No cough or respiratory distress. No hemoptysis. No history of asthma or RAD.  GI:  No nausea, vomiting, diarrhea or abdominal pain.  :  No dysuria, frequency or burning.  MS:  No myalgia, muscle weakness, joint pain or back pain.  Neuro:  No headache or weakness.  No LOC.  Skin:  No skin rash.   Endocrine: No history of thyroid disease or diabetes.

## 2019-06-28 NOTE — ED CDU PROVIDER INITIAL DAY NOTE - OBJECTIVE STATEMENT
Pt with hx of unprovoked DVT/PE, not on A/C, presenting with right lower ext pain and swelling a/w shortness of breath/ burning chest pain radiating up through esophagus. and nonproductive cough x approx 10 days. with acute worsening over last three days. no fever. no chills. no exertional dyspnea or angina. Pt states she is sp course of abx which helped with cough.

## 2019-06-28 NOTE — ED PROVIDER NOTE - ATTENDING CONTRIBUTION TO CARE
hx of unprovoked dvt/pe, not on a/c presenting with right lower ext pain and swelling a/w shortness of breath/ burning chest pain radiating up through esophagus. and nonproductive cough x approx 10 days. with acute worsening over last three days. no fever. no chills. no exertional dyspnea or angina. Pt states she is sp course of abx which helped with cough.  VITAL SIGNS: noted  CONSTITUTIONAL: Well-developed; well-nourished; in no acute distress  HEAD: Normocephalic; atraumatic  EYES: conjunctiva and sclera clear  ENT: No nasal discharge; airway clear. MMM  NECK: Supple; non tender. No anterior cervical lymphadenopathy noted  CARD: Regular rate and rhythm  RESP: CTAB/L, no wheezes, rales or rhonchi  ABD: Normal bowel sounds; soft; non-distended; non-tender; No CVAT  EXT: Normal ROM. No calf tenderness or edema. Distal pulses intact  NEURO: Alert, oriented. Grossly unremarkable. No focal deficits  SKIN: Skin exam is warm and dry, no acute rash  MS: No midline spinal tenderness   Will eval for pna, pe,dvt, labs, xr, ekg, ddimer, analgesia reassess.

## 2019-06-28 NOTE — ED CDU PROVIDER DISPOSITION NOTE - CARE PROVIDER_API CALL
José Jones)  Critical Care Medicine; Internal Medicine; Pulmonary Disease; Sleep Medicine  [address] [place] [address]  Phone: [phone]  Fax: [phone]  Follow Up Time: 1-3 Days    Erlin Velázquez)  Cardiovascular Disease; Internal Medicine  [address] [place] [address]  Phone: [phone]  Fax: [phone]  Follow Up Time: 1-3 Days

## 2020-05-10 ENCOUNTER — EMERGENCY (EMERGENCY)
Facility: HOSPITAL | Age: 40
LOS: 0 days | Discharge: HOME | End: 2020-05-11
Attending: EMERGENCY MEDICINE | Admitting: EMERGENCY MEDICINE
Payer: MEDICAID

## 2020-05-10 VITALS
DIASTOLIC BLOOD PRESSURE: 56 MMHG | RESPIRATION RATE: 18 BRPM | HEART RATE: 82 BPM | OXYGEN SATURATION: 100 % | SYSTOLIC BLOOD PRESSURE: 126 MMHG | WEIGHT: 225.97 LBS | TEMPERATURE: 98 F

## 2020-05-10 DIAGNOSIS — Z90.49 ACQUIRED ABSENCE OF OTHER SPECIFIED PARTS OF DIGESTIVE TRACT: Chronic | ICD-10-CM

## 2020-05-10 DIAGNOSIS — Z90.49 ACQUIRED ABSENCE OF OTHER SPECIFIED PARTS OF DIGESTIVE TRACT: ICD-10-CM

## 2020-05-10 DIAGNOSIS — Z98.891 HISTORY OF UTERINE SCAR FROM PREVIOUS SURGERY: Chronic | ICD-10-CM

## 2020-05-10 DIAGNOSIS — R10.9 UNSPECIFIED ABDOMINAL PAIN: ICD-10-CM

## 2020-05-10 DIAGNOSIS — R10.32 LEFT LOWER QUADRANT PAIN: ICD-10-CM

## 2020-05-10 DIAGNOSIS — Z88.0 ALLERGY STATUS TO PENICILLIN: ICD-10-CM

## 2020-05-10 LAB
ALBUMIN SERPL ELPH-MCNC: 4.8 G/DL — SIGNIFICANT CHANGE UP (ref 3.5–5.2)
ALP SERPL-CCNC: 66 U/L — SIGNIFICANT CHANGE UP (ref 30–115)
ALT FLD-CCNC: 18 U/L — SIGNIFICANT CHANGE UP (ref 0–41)
ANION GAP SERPL CALC-SCNC: 12 MMOL/L — SIGNIFICANT CHANGE UP (ref 7–14)
APPEARANCE UR: CLEAR — SIGNIFICANT CHANGE UP
AST SERPL-CCNC: 17 U/L — SIGNIFICANT CHANGE UP (ref 0–41)
BASOPHILS # BLD AUTO: 0.05 K/UL — SIGNIFICANT CHANGE UP (ref 0–0.2)
BASOPHILS NFR BLD AUTO: 0.6 % — SIGNIFICANT CHANGE UP (ref 0–1)
BILIRUB DIRECT SERPL-MCNC: <0.2 MG/DL — SIGNIFICANT CHANGE UP (ref 0–0.2)
BILIRUB INDIRECT FLD-MCNC: < 0.2 MG/DL — SIGNIFICANT CHANGE UP (ref 0.2–1.2)
BILIRUB SERPL-MCNC: <0.2 MG/DL — SIGNIFICANT CHANGE UP (ref 0.2–1.2)
BILIRUB UR-MCNC: NEGATIVE — SIGNIFICANT CHANGE UP
BUN SERPL-MCNC: 18 MG/DL — SIGNIFICANT CHANGE UP (ref 10–20)
CALCIUM SERPL-MCNC: 9.9 MG/DL — SIGNIFICANT CHANGE UP (ref 8.5–10.1)
CHLORIDE SERPL-SCNC: 103 MMOL/L — SIGNIFICANT CHANGE UP (ref 98–110)
CO2 SERPL-SCNC: 26 MMOL/L — SIGNIFICANT CHANGE UP (ref 17–32)
COLOR SPEC: YELLOW — SIGNIFICANT CHANGE UP
CREAT SERPL-MCNC: 0.7 MG/DL — SIGNIFICANT CHANGE UP (ref 0.7–1.5)
DIFF PNL FLD: NEGATIVE — SIGNIFICANT CHANGE UP
EOSINOPHIL # BLD AUTO: 0.15 K/UL — SIGNIFICANT CHANGE UP (ref 0–0.7)
EOSINOPHIL NFR BLD AUTO: 1.9 % — SIGNIFICANT CHANGE UP (ref 0–8)
GLUCOSE SERPL-MCNC: 95 MG/DL — SIGNIFICANT CHANGE UP (ref 70–99)
GLUCOSE UR QL: NEGATIVE MG/DL — SIGNIFICANT CHANGE UP
HCG SERPL QL: NEGATIVE — SIGNIFICANT CHANGE UP
HCT VFR BLD CALC: 38.3 % — SIGNIFICANT CHANGE UP (ref 37–47)
HGB BLD-MCNC: 12.3 G/DL — SIGNIFICANT CHANGE UP (ref 12–16)
IMM GRANULOCYTES NFR BLD AUTO: 0.6 % — HIGH (ref 0.1–0.3)
KETONES UR-MCNC: NEGATIVE — SIGNIFICANT CHANGE UP
LACTATE SERPL-SCNC: 1.1 MMOL/L — SIGNIFICANT CHANGE UP (ref 0.7–2)
LEUKOCYTE ESTERASE UR-ACNC: NEGATIVE — SIGNIFICANT CHANGE UP
LIDOCAIN IGE QN: 25 U/L — SIGNIFICANT CHANGE UP (ref 7–60)
LYMPHOCYTES # BLD AUTO: 2.97 K/UL — SIGNIFICANT CHANGE UP (ref 1.2–3.4)
LYMPHOCYTES # BLD AUTO: 37.2 % — SIGNIFICANT CHANGE UP (ref 20.5–51.1)
MCHC RBC-ENTMCNC: 29.2 PG — SIGNIFICANT CHANGE UP (ref 27–31)
MCHC RBC-ENTMCNC: 32.1 G/DL — SIGNIFICANT CHANGE UP (ref 32–37)
MCV RBC AUTO: 91 FL — SIGNIFICANT CHANGE UP (ref 81–99)
MONOCYTES # BLD AUTO: 0.65 K/UL — HIGH (ref 0.1–0.6)
MONOCYTES NFR BLD AUTO: 8.1 % — SIGNIFICANT CHANGE UP (ref 1.7–9.3)
NEUTROPHILS # BLD AUTO: 4.11 K/UL — SIGNIFICANT CHANGE UP (ref 1.4–6.5)
NEUTROPHILS NFR BLD AUTO: 51.6 % — SIGNIFICANT CHANGE UP (ref 42.2–75.2)
NITRITE UR-MCNC: NEGATIVE — SIGNIFICANT CHANGE UP
NRBC # BLD: 0 /100 WBCS — SIGNIFICANT CHANGE UP (ref 0–0)
PH UR: 5.5 — SIGNIFICANT CHANGE UP (ref 5–8)
PLATELET # BLD AUTO: 317 K/UL — SIGNIFICANT CHANGE UP (ref 130–400)
POTASSIUM SERPL-MCNC: 4.2 MMOL/L — SIGNIFICANT CHANGE UP (ref 3.5–5)
POTASSIUM SERPL-SCNC: 4.2 MMOL/L — SIGNIFICANT CHANGE UP (ref 3.5–5)
PROT SERPL-MCNC: 7.3 G/DL — SIGNIFICANT CHANGE UP (ref 6–8)
PROT UR-MCNC: NEGATIVE MG/DL — SIGNIFICANT CHANGE UP
RBC # BLD: 4.21 M/UL — SIGNIFICANT CHANGE UP (ref 4.2–5.4)
RBC # FLD: 13.1 % — SIGNIFICANT CHANGE UP (ref 11.5–14.5)
SODIUM SERPL-SCNC: 141 MMOL/L — SIGNIFICANT CHANGE UP (ref 135–146)
SP GR SPEC: 1.02 — SIGNIFICANT CHANGE UP (ref 1.01–1.03)
UROBILINOGEN FLD QL: 0.2 MG/DL — SIGNIFICANT CHANGE UP (ref 0.2–0.2)
WBC # BLD: 7.98 K/UL — SIGNIFICANT CHANGE UP (ref 4.8–10.8)
WBC # FLD AUTO: 7.98 K/UL — SIGNIFICANT CHANGE UP (ref 4.8–10.8)

## 2020-05-10 PROCEDURE — 74177 CT ABD & PELVIS W/CONTRAST: CPT | Mod: 26

## 2020-05-10 PROCEDURE — 99285 EMERGENCY DEPT VISIT HI MDM: CPT

## 2020-05-10 RX ORDER — SODIUM CHLORIDE 9 MG/ML
1000 INJECTION INTRAMUSCULAR; INTRAVENOUS; SUBCUTANEOUS ONCE
Refills: 0 | Status: COMPLETED | OUTPATIENT
Start: 2020-05-10 | End: 2020-05-10

## 2020-05-10 RX ORDER — ONDANSETRON 8 MG/1
4 TABLET, FILM COATED ORAL ONCE
Refills: 0 | Status: COMPLETED | OUTPATIENT
Start: 2020-05-10 | End: 2020-05-10

## 2020-05-10 RX ADMIN — ONDANSETRON 4 MILLIGRAM(S): 8 TABLET, FILM COATED ORAL at 22:52

## 2020-05-10 RX ADMIN — SODIUM CHLORIDE 1000 MILLILITER(S): 9 INJECTION INTRAMUSCULAR; INTRAVENOUS; SUBCUTANEOUS at 22:52

## 2020-05-10 NOTE — ED ADULT NURSE NOTE - NSIMPLEMENTINTERV_GEN_ALL_ED
Implemented All Universal Safety Interventions:  [place] to call system. Call bell, personal items and telephone within reach. Instruct patient to call for assistance. Room bathroom lighting operational. Non-slip footwear when patient is off stretcher. Physically safe environment: no spills, clutter or unnecessary equipment. Stretcher in lowest position, wheels locked, appropriate side rails in place.

## 2020-05-11 RX ORDER — FAMOTIDINE 10 MG/ML
1 INJECTION INTRAVENOUS
Qty: 14 | Refills: 0
Start: 2020-05-11 | End: 2020-05-24

## 2020-05-11 RX ADMIN — Medication 20 MILLIGRAM(S): at 00:27

## 2020-05-11 NOTE — ED PROVIDER NOTE - PROVIDER TOKENS
PROVIDER:[TOKEN:[[id number]],FOLLOWUP:[1-3 Days]],PROVIDER:[TOKEN:[[id number]],FOLLOWUP:[1-3 Days]]

## 2020-05-11 NOTE — ED PROVIDER NOTE - ATTENDING CONTRIBUTION TO CARE
I personally evaluated the patient. I reviewed the Resident’s or Physician Assistant’s note (as assigned above), and agree with the findings and plan except as documented in my note.  Chart reviewed. H/O asthma, works in a nursing home, presents with cough for 1 week. Tested negative for Covid last week. Requests a CXR. Exam shows alert patient in no distress, HEENT NCAT, lungs clear, RR S1S2, abdomen soft Nt +BS, no CCE. I personally evaluated the patient. I reviewed the Resident’s or Physician Assistant’s note (as assigned above), and agree with the findings and plan except as documented in my note. I personally evaluated the patient. I reviewed the Resident’s or Physician Assistant’s note (as assigned above), and agree with the findings and plan except as documented in my note.  Chart reviewed. S/P appendectomy, h/o diverticulitis, presents with left sided abdominal pain for few days. No fever. Feels nauseous. Exam shows alert patient in no distress, HEENT NCAT, lungs clear, RR S1S2, abdomen soft +LLQ tenderness +BS, no rebound or guarding, no CCE.

## 2020-05-11 NOTE — ED PROVIDER NOTE - PATIENT PORTAL LINK FT
You can access the FollowMyHealth Patient Portal offered by [facility] by registering at the following website: [email or web address]. By joining [employer]’s FollowMyHealth portal, you will also be able to view your health information using other applications (apps) compatible with our system.

## 2020-05-11 NOTE — ED PROVIDER NOTE - CLINICAL SUMMARY MEDICAL DECISION MAKING FREE TEXT BOX
Labs unremarkable. UA negative. Preg negative. CT abdo no acute pathology. Given IVF, Zofran and Bentyl. Will D/C to follow up with GI.

## 2020-05-11 NOTE — ED PROVIDER NOTE - OBJECTIVE STATEMENT
40 yo F with PMHx of diverticulitis and appendectomy presents to the ED c/o moderate, sharp, non-radiating, left lower abdominal pain x 1 week. She admits to associated nausea and non-bloody diarrhea. She denies taking medication to improve symptoms. She denies other complaints. Pt denies fever, chills, vomiting, recent antibiotic use, headache, dizziness, weakness, chest pain, SOB, back pain, LOC, trauma, urinary symptoms, cough, calf pain/swelling, recent travel, recent surgery.

## 2020-05-11 NOTE — ED PROVIDER NOTE - CARE PROVIDER_API CALL
Fco Arnold  Gastroenterology  [address]  [place] [address]  Phone: [phone]  Fax: [phone]  Follow Up Time: 1-3 Days    Sukhwinder Orona)  Gastroenterology; Internal Medicine  [address] [place] [address]  Phone: [phone]  Fax: [phone]  Follow Up Time: 1-3 Days

## 2020-05-11 NOTE — ED PROVIDER NOTE - CARE PROVIDERS DIRECT ADDRESSES
,DirectAddress_Unknown,[email or web address]

## 2020-05-11 NOTE — ED PROVIDER NOTE - PHYSICAL EXAMINATION
VITAL SIGNS: I have reviewed nursing notes and confirm.  CONSTITUTIONAL: Well-developed; well-nourished; in no acute distress.  SKIN: Skin exam is warm and dry, no acute rash.  HEAD: Normocephalic; atraumatic.  EYES: Conjunctiva and sclera clear.  ENT: No nasal discharge; airway clear.   CARD: S1, S2 normal; no murmurs, gallops, or rubs. Regular rate and rhythm.  RESP: No wheezes, rales or rhonchi. Speaking in full sentences.   ABD: Normal bowel sounds; soft; non-distended; (+) LLQ TTP; No rebound or guarding. No CVA tenderness.  EXT: Normal ROM.   NEURO: Alert, oriented. Grossly unremarkable. No focal deficits.

## 2020-08-03 ENCOUNTER — EMERGENCY (EMERGENCY)
Facility: HOSPITAL | Age: 40
LOS: 0 days | Discharge: HOME | End: 2020-08-03
Attending: EMERGENCY MEDICINE | Admitting: EMERGENCY MEDICINE
Payer: MEDICAID

## 2020-08-03 VITALS
DIASTOLIC BLOOD PRESSURE: 59 MMHG | HEIGHT: 66 IN | HEART RATE: 93 BPM | TEMPERATURE: 103 F | OXYGEN SATURATION: 99 % | SYSTOLIC BLOOD PRESSURE: 117 MMHG | WEIGHT: 216.05 LBS | RESPIRATION RATE: 18 BRPM

## 2020-08-03 VITALS — DIASTOLIC BLOOD PRESSURE: 56 MMHG | TEMPERATURE: 100 F | SYSTOLIC BLOOD PRESSURE: 110 MMHG | HEART RATE: 74 BPM

## 2020-08-03 DIAGNOSIS — R21 RASH AND OTHER NONSPECIFIC SKIN ERUPTION: ICD-10-CM

## 2020-08-03 DIAGNOSIS — R50.9 FEVER, UNSPECIFIED: ICD-10-CM

## 2020-08-03 DIAGNOSIS — R31.9 HEMATURIA, UNSPECIFIED: ICD-10-CM

## 2020-08-03 DIAGNOSIS — Z90.49 ACQUIRED ABSENCE OF OTHER SPECIFIED PARTS OF DIGESTIVE TRACT: Chronic | ICD-10-CM

## 2020-08-03 DIAGNOSIS — Z98.891 HISTORY OF UTERINE SCAR FROM PREVIOUS SURGERY: Chronic | ICD-10-CM

## 2020-08-03 DIAGNOSIS — J18.9 PNEUMONIA, UNSPECIFIED ORGANISM: ICD-10-CM

## 2020-08-03 DIAGNOSIS — Z88.0 ALLERGY STATUS TO PENICILLIN: ICD-10-CM

## 2020-08-03 DIAGNOSIS — Z20.828 CONTACT WITH AND (SUSPECTED) EXPOSURE TO OTHER VIRAL COMMUNICABLE DISEASES: ICD-10-CM

## 2020-08-03 LAB
ALBUMIN SERPL ELPH-MCNC: 4.2 G/DL — SIGNIFICANT CHANGE UP (ref 3.5–5.2)
ALP SERPL-CCNC: 71 U/L — SIGNIFICANT CHANGE UP (ref 30–115)
ALT FLD-CCNC: 33 U/L — SIGNIFICANT CHANGE UP (ref 0–41)
ANION GAP SERPL CALC-SCNC: 12 MMOL/L — SIGNIFICANT CHANGE UP (ref 7–14)
APPEARANCE UR: CLEAR — SIGNIFICANT CHANGE UP
AST SERPL-CCNC: 34 U/L — SIGNIFICANT CHANGE UP (ref 0–41)
BACTERIA # UR AUTO: ABNORMAL
BASOPHILS # BLD AUTO: 0.04 K/UL — SIGNIFICANT CHANGE UP (ref 0–0.2)
BASOPHILS NFR BLD AUTO: 0.6 % — SIGNIFICANT CHANGE UP (ref 0–1)
BILIRUB SERPL-MCNC: 0.4 MG/DL — SIGNIFICANT CHANGE UP (ref 0.2–1.2)
BILIRUB UR-MCNC: NEGATIVE — SIGNIFICANT CHANGE UP
BUN SERPL-MCNC: 8 MG/DL — LOW (ref 10–20)
CALCIUM SERPL-MCNC: 9.2 MG/DL — SIGNIFICANT CHANGE UP (ref 8.5–10.1)
CHLORIDE SERPL-SCNC: 100 MMOL/L — SIGNIFICANT CHANGE UP (ref 98–110)
CO2 SERPL-SCNC: 21 MMOL/L — SIGNIFICANT CHANGE UP (ref 17–32)
COLOR SPEC: YELLOW — SIGNIFICANT CHANGE UP
CREAT SERPL-MCNC: 0.8 MG/DL — SIGNIFICANT CHANGE UP (ref 0.7–1.5)
DIFF PNL FLD: ABNORMAL
EOSINOPHIL # BLD AUTO: 0.03 K/UL — SIGNIFICANT CHANGE UP (ref 0–0.7)
EOSINOPHIL NFR BLD AUTO: 0.4 % — SIGNIFICANT CHANGE UP (ref 0–8)
EPI CELLS # UR: ABNORMAL /HPF
GLUCOSE SERPL-MCNC: 109 MG/DL — HIGH (ref 70–99)
GLUCOSE UR QL: NEGATIVE MG/DL — SIGNIFICANT CHANGE UP
HCT VFR BLD CALC: 37.2 % — SIGNIFICANT CHANGE UP (ref 37–47)
HGB BLD-MCNC: 12.3 G/DL — SIGNIFICANT CHANGE UP (ref 12–16)
IMM GRANULOCYTES NFR BLD AUTO: 0.3 % — SIGNIFICANT CHANGE UP (ref 0.1–0.3)
KETONES UR-MCNC: NEGATIVE — SIGNIFICANT CHANGE UP
LEUKOCYTE ESTERASE UR-ACNC: NEGATIVE — SIGNIFICANT CHANGE UP
LYMPHOCYTES # BLD AUTO: 1.34 K/UL — SIGNIFICANT CHANGE UP (ref 1.2–3.4)
LYMPHOCYTES # BLD AUTO: 19.3 % — LOW (ref 20.5–51.1)
MCHC RBC-ENTMCNC: 29.4 PG — SIGNIFICANT CHANGE UP (ref 27–31)
MCHC RBC-ENTMCNC: 33.1 G/DL — SIGNIFICANT CHANGE UP (ref 32–37)
MCV RBC AUTO: 89 FL — SIGNIFICANT CHANGE UP (ref 81–99)
MONOCYTES # BLD AUTO: 0.73 K/UL — HIGH (ref 0.1–0.6)
MONOCYTES NFR BLD AUTO: 10.5 % — HIGH (ref 1.7–9.3)
NEUTROPHILS # BLD AUTO: 4.77 K/UL — SIGNIFICANT CHANGE UP (ref 1.4–6.5)
NEUTROPHILS NFR BLD AUTO: 68.9 % — SIGNIFICANT CHANGE UP (ref 42.2–75.2)
NITRITE UR-MCNC: NEGATIVE — SIGNIFICANT CHANGE UP
NRBC # BLD: 0 /100 WBCS — SIGNIFICANT CHANGE UP (ref 0–0)
PH UR: 6 — SIGNIFICANT CHANGE UP (ref 5–8)
PLATELET # BLD AUTO: 205 K/UL — SIGNIFICANT CHANGE UP (ref 130–400)
POTASSIUM SERPL-MCNC: 4.2 MMOL/L — SIGNIFICANT CHANGE UP (ref 3.5–5)
POTASSIUM SERPL-SCNC: 4.2 MMOL/L — SIGNIFICANT CHANGE UP (ref 3.5–5)
PROT SERPL-MCNC: 6.9 G/DL — SIGNIFICANT CHANGE UP (ref 6–8)
PROT UR-MCNC: NEGATIVE MG/DL — SIGNIFICANT CHANGE UP
RBC # BLD: 4.18 M/UL — LOW (ref 4.2–5.4)
RBC # FLD: 12.9 % — SIGNIFICANT CHANGE UP (ref 11.5–14.5)
RBC CASTS # UR COMP ASSIST: ABNORMAL /HPF
SARS-COV-2 RNA SPEC QL NAA+PROBE: SIGNIFICANT CHANGE UP
SODIUM SERPL-SCNC: 133 MMOL/L — LOW (ref 135–146)
SP GR SPEC: 1.02 — SIGNIFICANT CHANGE UP (ref 1.01–1.03)
TROPONIN T SERPL-MCNC: <0.01 NG/ML — SIGNIFICANT CHANGE UP
UROBILINOGEN FLD QL: 0.2 MG/DL — SIGNIFICANT CHANGE UP (ref 0.2–0.2)
WBC # BLD: 6.93 K/UL — SIGNIFICANT CHANGE UP (ref 4.8–10.8)
WBC # FLD AUTO: 6.93 K/UL — SIGNIFICANT CHANGE UP (ref 4.8–10.8)
WBC UR QL: SIGNIFICANT CHANGE UP /HPF

## 2020-08-03 PROCEDURE — 74177 CT ABD & PELVIS W/CONTRAST: CPT | Mod: 26

## 2020-08-03 PROCEDURE — 99285 EMERGENCY DEPT VISIT HI MDM: CPT

## 2020-08-03 PROCEDURE — 71045 X-RAY EXAM CHEST 1 VIEW: CPT | Mod: 26

## 2020-08-03 PROCEDURE — 71275 CT ANGIOGRAPHY CHEST: CPT | Mod: 26

## 2020-08-03 PROCEDURE — 93970 EXTREMITY STUDY: CPT | Mod: 26

## 2020-08-03 RX ORDER — ACETAMINOPHEN 500 MG
975 TABLET ORAL ONCE
Refills: 0 | Status: COMPLETED | OUTPATIENT
Start: 2020-08-03 | End: 2020-08-03

## 2020-08-03 RX ORDER — CEFTRIAXONE 500 MG/1
1000 INJECTION, POWDER, FOR SOLUTION INTRAMUSCULAR; INTRAVENOUS ONCE
Refills: 0 | Status: DISCONTINUED | OUTPATIENT
Start: 2020-08-03 | End: 2020-08-03

## 2020-08-03 RX ORDER — CIPROFLOXACIN LACTATE 400MG/40ML
1 VIAL (ML) INTRAVENOUS
Qty: 5 | Refills: 0
Start: 2020-08-03 | End: 2020-08-07

## 2020-08-03 RX ORDER — KETOROLAC TROMETHAMINE 30 MG/ML
30 SYRINGE (ML) INJECTION ONCE
Refills: 0 | Status: DISCONTINUED | OUTPATIENT
Start: 2020-08-03 | End: 2020-08-03

## 2020-08-03 RX ADMIN — Medication 30 MILLIGRAM(S): at 20:44

## 2020-08-03 RX ADMIN — Medication 975 MILLIGRAM(S): at 15:42

## 2020-08-03 NOTE — ED PROVIDER NOTE - PHYSICAL EXAMINATION
CONSTITUTIONAL: Well-developed; well-nourished; in no acute distress.   SKIN: warm, dry  HEAD: Normocephalic; atraumatic.  EYES: PERRL, EOMI, no conjunctival erythema  ENT: No nasal discharge; airway clear.  NECK: Supple; non tender.  CARD: S1, S2 normal; no murmurs, gallops, or rubs. Regular rate and rhythm.   RESP: No wheezes, rales or rhonchi.  ABD: soft ntnd  EXT: Normal ROM.  No clubbing, cyanosis or edema.   LYMPH: No acute cervical adenopathy.  NEURO: Alert, oriented, grossly unremarkable  PSYCH: Cooperative, appropriate. CONSTITUTIONAL: Well-developed; well-nourished; in no acute distress.   SKIN: warm, dry mild maculopapular rash to lateral aspect of L. leg.  HEAD: Normocephalic; atraumatic.  EYES: PERRL, EOMI, no conjunctival erythema  ENT: No nasal discharge; airway clear.  NECK: Supple; non tender.  CARD: S1, S2 normal; no murmurs, gallops, or rubs. Regular rate and rhythm.   RESP: No wheezes, rales or rhonchi.  ABD: soft ntnd  EXT: Normal ROM.  No clubbing, cyanosis or edema.   LYMPH: No acute cervical adenopathy.  NEURO: Alert, oriented, grossly unremarkable  PSYCH: Cooperative, appropriate.

## 2020-08-03 NOTE — ED PROVIDER NOTE - PATIENT PORTAL LINK FT
You can access the FollowMyHealth Patient Portal offered by [facility] by registering at the following website: [email or web address]. By joining [facility]’s FollowMyHealth portal, you will also be able to view your health information using other applications (apps) compatible with our system.

## 2020-08-03 NOTE — ED PROVIDER NOTE - CLINICAL SUMMARY MEDICAL DECISION MAKING FREE TEXT BOX
pt presented with fever, uri sx's abd pain. pt found to have suspected pneumonia. no pe on ct thorax. no sig abd finding on ct. pt well appearing, ambulated with normal pulse ox. pt advised to outpt pulm f/u. also f/u for hematuria.

## 2020-08-03 NOTE — ED PROVIDER NOTE - PROGRESS NOTE DETAILS
pt in ct, reports abd pain concern. ct abd pelvis added on ATTENDING NOTE: 38 y/o F with PMHx PE, no anticoagulation and clotting disorder presents with fever x3 days, associated with sore throat, nasal congest, left side CP, left flank pain and runny nose. On exam: CON: ao x 3, HENMT: clear oropharynx,  neck supple,  CV: rrr, equal pulses b/l, RESP: cta b/l, GI:  soft, nontender, no rebound, no guarding, SKIN: no rash, MSK: (+) small circular area of redness, raised to left calf, no deformities, NEURO: no gross motor or sensory deficit Psychiatric: appropriate mood, appropriate affect. Imp: fever and multitude of URI chest and abdominal sxs. Plan to r/o PE. Pt concerned for abdominal pathology so CT added. CT showing no PE, but consolidation at right lung base. Pt to be treated for pneumonia and advised f/u as out-patient for repeat imaging and to PMD for trace amounts of blood in urine. Results d/w patient and  copies of results provided.  Pt instructed to return if any worsening symptoms or concerns.  Discussed with patient follow up and care plan. They verbalize understanding all discussed and all questions answered.. ATTENDING NOTE: 38 y/o F with PMHx PE, no anticoagulation and clotting disorder presents with fever x3 days, associated with sore throat, nasal congest, left side CP, left flank pain and runny nose. On exam: CON: ao x 3, HENMT: clear oropharynx,  neck supple,  CV: rrr, equal pulses b/l, RESP: cta b/l, GI:  soft, nontender, no rebound, no guarding, SKIN: no rash, MSK: (+) small circular area of redness, raised to left calf, no deformities, NEURO: no gross motor or sensory deficit Psychiatric: appropriate mood, appropriate affect. Imp: fever and multitude of URI chest and abdominal sxs. Plan to r/o PE. Pt concerned for abdominal pathology so CT added. CT showing no PE, but consolidation at right lung base. Pt to be treated for pneumonia and advised f/u as out-patient for repeat imaging and to PMD for trace amount of blood in urine.

## 2020-08-03 NOTE — ED PROVIDER NOTE - NSFOLLOWUPINSTRUCTIONS_ED_ALL_ED_FT
Follow up with your primary care doctor and pulmonologist 1-2 days    Pneumonia    WHAT YOU NEED TO KNOW:    Pneumonia is an infection in your lungs caused by bacteria, viruses, fungi, or parasites. You can become infected if you come in contact with someone who is sick. You can get pneumonia if you recently had surgery or needed a ventilator to help you breathe. Pneumonia can also be caused by accidentally inhaling saliva or small pieces of food. Pneumonia may cause mild symptoms, or it can be severe and life-threatening. The Lungs         DISCHARGE INSTRUCTIONS:    Return to the emergency department if:     You cough up blood.       Your heart beats more than 100 beats in 1 minute.       You are very tired, confused, and cannot think clearly.      You have chest pain or trouble breathing.       Your lips or fingernails turn gray or blue.     Contact your healthcare provider if:     Your symptoms are the same or get worse 48 hours after you start antibiotics.      Your fever is not below 99°F (37.2°C) 48 hours after you start antibiotics.       You have a fever higher than 101°F (38.3°C).       You cannot eat, or you have loss of appetite, nausea, or are vomiting.      You have questions or concerns about your condition or care.    Medicines:     Antibiotics treat pneumonia caused by bacteria.      Acetaminophen decreases pain and fever. It is available without a doctor's order. Ask how much to take and how often to take it. Follow directions. Read the labels of all other medicines you are using to see if they also contain acetaminophen, or ask your doctor or pharmacist. Acetaminophen can cause liver damage if not taken correctly. Do not use more than 4 grams (4,000 milligrams) total of acetaminophen in one day.       NSAIDs, such as ibuprofen, help decrease swelling, pain, and fever. This medicine is available with or without a doctor's order. NSAIDs can cause stomach bleeding or kidney problems in certain people. If you take blood thinner medicine, always ask your healthcare provider if NSAIDs are safe for you. Always read the medicine label and follow directions.      Take your medicine as directed. Contact your healthcare provider if you think your medicine is not helping or if you have side effects. Tell him or her if you are allergic to any medicine. Keep a list of the medicines, vitamins, and herbs you take. Include the amounts, and when and why you take them. Bring the list or the pill bottles to follow-up visits. Carry your medicine list with you in case of an emergency.    Follow up with your healthcare provider as directed: You will need to return for more tests. Write down your questions so you remember to ask them during your visits.     Manage your symptoms:     Rest as needed. Rest often throughout the day. Alternate times of activity with times of rest.      Drink liquids as directed. Ask how much liquid to drink each day and which liquids are best for you. Liquids help thin your mucus, which may make it easier for you to cough it up.       Do not smoke. Avoid secondhand smoke. Smoking increases your risk for pneumonia. Smoking also makes it harder for you to get better after you have had pneumonia. Ask your healthcare provider for information if you need help to quit smoking.       Use a cool mist humidifier. A humidifier will help increase air moisture in your home. This may make it easier for you to breathe and help decrease your cough.       Keep your head elevated. You may be able to breathe better if you lie down with the head of your bed up.     Prevent pneumonia:     Prevent the spread of germs. Wash your hands often with soap and water. Use gel hand cleanser when there is no soap and water available. Do not touch your eyes, nose, or mouth unless you have washed your hands first. Cover your mouth when you cough. Cough into a tissue or your shirtsleeve so you do not spread germs from your hands. If you are sick, stay away from others as much as possible. Handwashing           Limit alcohol. Women should limit alcohol to 1 drink a day. Men should limit alcohol to 2 drinks a day. A drink of alcohol is 12 ounces of beer, 5 ounces of wine, or 1½ ounces of liquor.      Ask about vaccines. You may need a vaccine to help prevent pneumonia. Get an influenza (flu) vaccine every year as soon as it becomes available.          © Copyright [employer] 2019 All illustrations and images included in CareNotes are the copyrighted property of [employer] or [employer].

## 2020-08-03 NOTE — ED PROVIDER NOTE - CARE PROVIDER_API CALL
José Jones)  Critical Care Medicine; Internal Medicine; Pulmonary Disease; Sleep Medicine  [address] [place] [address]  Phone: [phone]  Fax: [phone]  Follow Up Time: 1-3 Days

## 2020-08-03 NOTE — ED PROVIDER NOTE - NS ED ROS FT
Eyes:  No visual changes, eye pain or discharge.  ENMT:  No hearing changes, pain, no difficulty swallowing + sore throat, runny nose  Cardiac:  No SOB or edema. No chest pain with exertion. + chest pain  Respiratory:  No cough or respiratory distress. No hemoptysis. No history of asthma or RAD.  GI:  No nausea, vomiting, diarrhea + abdominal pain.  :  No dysuria, frequency or burning.  MS:  No muscle weakness, joint pain or back pain. + myalgia  Neuro:  No headache or weakness.  No LOC.  Skin:  + skin rash.   Endocrine: No history of thyroid disease or diabetes.

## 2020-08-03 NOTE — ED ADULT TRIAGE NOTE - CHIEF COMPLAINT QUOTE
pt c/o 3 days of bodyaches, abd pain, flank pain, left arm pain, denies ill contacts, denies travel.

## 2020-08-03 NOTE — ED PROVIDER NOTE - EKG #1 DATE/TIME
Dermatology Rooming Note    Lindsay Payne's goals for this visit include:   Chief Complaint   Patient presents with     Derm Problem     Lindsay is here to have a mole re excised.     Deann Virk LPN   03-Aug-2020 15:50

## 2020-08-03 NOTE — PHARMACOTHERAPY INTERVENTION NOTE - COMMENTS
PCN Allergy<-> Rocephin. Dr. Priest in [facility] notified, he spoke to daughter, and called me to say he would dc the Rocephin order.

## 2020-08-03 NOTE — ED PROVIDER NOTE - OBJECTIVE STATEMENT
39y F w/ PMH of PE not on AC presents with 3 days of fever. Has associated sore throat, nasal congestion, body aches, and L. sided flank pain. No alleviating/worsening factors. No recent travels. Denies headache, CP, SOB, n/v/d, dysuria, hematuria, or numbness/tingling.

## 2020-08-08 ENCOUNTER — APPOINTMENT (OUTPATIENT)
Dept: INTERNAL MEDICINE | Facility: CLINIC | Age: 40
End: 2020-08-08

## 2020-08-14 ENCOUNTER — OUTPATIENT (OUTPATIENT)
Dept: OUTPATIENT SERVICES | Facility: HOSPITAL | Age: 40
LOS: 1 days | Discharge: HOME | End: 2020-08-14

## 2020-08-14 ENCOUNTER — APPOINTMENT (OUTPATIENT)
Dept: PULMONOLOGY | Facility: CLINIC | Age: 40
End: 2020-08-14
Payer: MEDICAID

## 2020-08-14 VITALS
SYSTOLIC BLOOD PRESSURE: 116 MMHG | HEIGHT: 66 IN | DIASTOLIC BLOOD PRESSURE: 72 MMHG | BODY MASS INDEX: 34.72 KG/M2 | OXYGEN SATURATION: 99 % | TEMPERATURE: 98.7 F | HEART RATE: 66 BPM | WEIGHT: 216 LBS

## 2020-08-14 DIAGNOSIS — Z98.891 HISTORY OF UTERINE SCAR FROM PREVIOUS SURGERY: Chronic | ICD-10-CM

## 2020-08-14 DIAGNOSIS — Z90.49 ACQUIRED ABSENCE OF OTHER SPECIFIED PARTS OF DIGESTIVE TRACT: Chronic | ICD-10-CM

## 2020-08-14 DIAGNOSIS — J18.9 PNEUMONIA, UNSPECIFIED ORGANISM: ICD-10-CM

## 2020-08-14 PROCEDURE — 99203 OFFICE O/P NEW LOW 30 MIN: CPT | Mod: GC

## 2020-08-14 NOTE — ASSESSMENT
[FreeTextEntry1] : 40 y/o F  w/ PMHx of PE (7 years ago) not on any anticoagulant, recent PNA comes in for follow up after treatment for pneumonia. \par \par # recent PNA s/p levofloxacin x 5 days\par - currently afrbile, with no signs of sepsis\par - clinically improved\par - will repeat CXR in 6 weeks.\par - f/u in 1 month\par \par # Hx of Provoked PE 7 years ago\par - s/p Coumadin\par - most recent CTA and duplex negative for PE and DVT 8/3/20\par - no hx or multiple clots or abortions  \par - will monitor\par \par # Trace hematuria\par - f/u with PMD

## 2020-08-14 NOTE — HISTORY OF PRESENT ILLNESS
[Never] : never [TextBox_4] : 38 y/o F w/ PMHx of PE (7 years ago) not on any anticoagulant, recent PNA comes in for follow up after treatment for pneumonia. \par Pt visited [facility] for cough fever and SOB, was diagnosed with RLL PNA, and was treated with levofloxacin 750mg PO qd x 5 days.\par Pt still has cough, chest pain, \par Pulmonary embolism was provoked due to OCP and air travel, 3 hour flight. Pt has family history of blood clots in mother. \par Denies any fever, n/v/d, recent weight changes, or swelling in the legs.

## 2020-08-14 NOTE — PHYSICAL EXAM
[No Acute Distress] : no acute distress [Normal Oropharynx] : normal oropharynx [Normal Appearance] : normal appearance [No Murmurs] : no murmurs [Normal S1, S2] : normal s1, s2 [Normal Rate/Rhythm] : normal rate/rhythm [No Resp Distress] : no resp distress [Clear to Auscultation Bilaterally] : clear to auscultation bilaterally [Normal Gait] : normal gait [No Abnormalities] : no abnormalities [No Clubbing] : no clubbing [Normal Color/ Pigmentation] : normal color/ pigmentation [No Focal Deficits] : no focal deficits [Oriented x3] : oriented x3

## 2020-08-17 ENCOUNTER — OUTPATIENT (OUTPATIENT)
Dept: OUTPATIENT SERVICES | Facility: HOSPITAL | Age: 40
LOS: 1 days | Discharge: HOME | End: 2020-08-17
Payer: MEDICAID

## 2020-08-17 DIAGNOSIS — Z90.49 ACQUIRED ABSENCE OF OTHER SPECIFIED PARTS OF DIGESTIVE TRACT: Chronic | ICD-10-CM

## 2020-08-17 DIAGNOSIS — Z98.891 HISTORY OF UTERINE SCAR FROM PREVIOUS SURGERY: Chronic | ICD-10-CM

## 2020-08-17 DIAGNOSIS — R31.9 HEMATURIA, UNSPECIFIED: ICD-10-CM

## 2020-08-17 PROCEDURE — 76770 US EXAM ABDO BACK WALL COMP: CPT | Mod: 26

## 2020-08-26 ENCOUNTER — APPOINTMENT (OUTPATIENT)
Dept: INTERNAL MEDICINE | Facility: CLINIC | Age: 40
End: 2020-08-26

## 2020-09-17 ENCOUNTER — OUTPATIENT (OUTPATIENT)
Dept: OUTPATIENT SERVICES | Facility: HOSPITAL | Age: 40
LOS: 1 days | Discharge: HOME | End: 2020-09-17
Payer: MEDICAID

## 2020-09-17 DIAGNOSIS — Z90.49 ACQUIRED ABSENCE OF OTHER SPECIFIED PARTS OF DIGESTIVE TRACT: Chronic | ICD-10-CM

## 2020-09-17 DIAGNOSIS — Z98.891 HISTORY OF UTERINE SCAR FROM PREVIOUS SURGERY: Chronic | ICD-10-CM

## 2020-09-17 PROCEDURE — 72146 MRI CHEST SPINE W/O DYE: CPT | Mod: 26

## 2021-01-21 ENCOUNTER — EMERGENCY (EMERGENCY)
Facility: HOSPITAL | Age: 41
LOS: 0 days | Discharge: HOME | End: 2021-01-22
Attending: EMERGENCY MEDICINE | Admitting: EMERGENCY MEDICINE
Payer: MEDICAID

## 2021-01-21 ENCOUNTER — TRANSCRIPTION ENCOUNTER (OUTPATIENT)
Age: 41
End: 2021-01-21

## 2021-01-21 VITALS
HEIGHT: 66 IN | HEART RATE: 68 BPM | RESPIRATION RATE: 20 BRPM | SYSTOLIC BLOOD PRESSURE: 136 MMHG | OXYGEN SATURATION: 100 % | DIASTOLIC BLOOD PRESSURE: 82 MMHG

## 2021-01-21 DIAGNOSIS — U07.1 COVID-19: ICD-10-CM

## 2021-01-21 DIAGNOSIS — Z88.0 ALLERGY STATUS TO PENICILLIN: ICD-10-CM

## 2021-01-21 DIAGNOSIS — Z79.899 OTHER LONG TERM (CURRENT) DRUG THERAPY: ICD-10-CM

## 2021-01-21 DIAGNOSIS — Z98.891 HISTORY OF UTERINE SCAR FROM PREVIOUS SURGERY: Chronic | ICD-10-CM

## 2021-01-21 DIAGNOSIS — Z90.49 ACQUIRED ABSENCE OF OTHER SPECIFIED PARTS OF DIGESTIVE TRACT: ICD-10-CM

## 2021-01-21 DIAGNOSIS — Z90.49 ACQUIRED ABSENCE OF OTHER SPECIFIED PARTS OF DIGESTIVE TRACT: Chronic | ICD-10-CM

## 2021-01-21 DIAGNOSIS — R06.02 SHORTNESS OF BREATH: ICD-10-CM

## 2021-01-21 DIAGNOSIS — Z98.891 HISTORY OF UTERINE SCAR FROM PREVIOUS SURGERY: ICD-10-CM

## 2021-01-21 PROCEDURE — 99285 EMERGENCY DEPT VISIT HI MDM: CPT

## 2021-01-21 NOTE — ED ADULT NURSE NOTE - OBJECTIVE STATEMENT
patient complaints of headache, generalized weakness, SOB, dizziness and "feel like my speech is heavy".  Patient A&Ox4, O)Sat=100 on room air. Patient reports being Covid + as of January 19th.  Patient reports a PMH of PE not on room air.

## 2021-01-22 LAB
ALBUMIN SERPL ELPH-MCNC: 4 G/DL — SIGNIFICANT CHANGE UP (ref 3.5–5.2)
ALP SERPL-CCNC: 56 U/L — SIGNIFICANT CHANGE UP (ref 30–115)
ALT FLD-CCNC: 12 U/L — SIGNIFICANT CHANGE UP (ref 0–41)
ANION GAP SERPL CALC-SCNC: 11 MMOL/L — SIGNIFICANT CHANGE UP (ref 7–14)
APPEARANCE UR: CLEAR — SIGNIFICANT CHANGE UP
AST SERPL-CCNC: 14 U/L — SIGNIFICANT CHANGE UP (ref 0–41)
BASOPHILS # BLD AUTO: 0.02 K/UL — SIGNIFICANT CHANGE UP (ref 0–0.2)
BASOPHILS NFR BLD AUTO: 0.5 % — SIGNIFICANT CHANGE UP (ref 0–1)
BILIRUB SERPL-MCNC: <0.2 MG/DL — SIGNIFICANT CHANGE UP (ref 0.2–1.2)
BILIRUB UR-MCNC: NEGATIVE — SIGNIFICANT CHANGE UP
BUN SERPL-MCNC: 20 MG/DL — SIGNIFICANT CHANGE UP (ref 10–20)
CALCIUM SERPL-MCNC: 9.1 MG/DL — SIGNIFICANT CHANGE UP (ref 8.5–10.1)
CHLORIDE SERPL-SCNC: 103 MMOL/L — SIGNIFICANT CHANGE UP (ref 98–110)
CO2 SERPL-SCNC: 24 MMOL/L — SIGNIFICANT CHANGE UP (ref 17–32)
COLOR SPEC: SIGNIFICANT CHANGE UP
CREAT SERPL-MCNC: 0.6 MG/DL — LOW (ref 0.7–1.5)
D DIMER BLD IA.RAPID-MCNC: 428 NG/ML DDU — HIGH (ref 0–230)
DIFF PNL FLD: NEGATIVE — SIGNIFICANT CHANGE UP
EOSINOPHIL # BLD AUTO: 0.13 K/UL — SIGNIFICANT CHANGE UP (ref 0–0.7)
EOSINOPHIL NFR BLD AUTO: 3 % — SIGNIFICANT CHANGE UP (ref 0–8)
GLUCOSE SERPL-MCNC: 124 MG/DL — HIGH (ref 70–99)
GLUCOSE UR QL: NEGATIVE — SIGNIFICANT CHANGE UP
HCT VFR BLD CALC: 37.5 % — SIGNIFICANT CHANGE UP (ref 37–47)
HGB BLD-MCNC: 12.2 G/DL — SIGNIFICANT CHANGE UP (ref 12–16)
IMM GRANULOCYTES NFR BLD AUTO: 0.2 % — SIGNIFICANT CHANGE UP (ref 0.1–0.3)
KETONES UR-MCNC: NEGATIVE — SIGNIFICANT CHANGE UP
LEUKOCYTE ESTERASE UR-ACNC: NEGATIVE — SIGNIFICANT CHANGE UP
LYMPHOCYTES # BLD AUTO: 2.33 K/UL — SIGNIFICANT CHANGE UP (ref 1.2–3.4)
LYMPHOCYTES # BLD AUTO: 53 % — HIGH (ref 20.5–51.1)
MCHC RBC-ENTMCNC: 29.1 PG — SIGNIFICANT CHANGE UP (ref 27–31)
MCHC RBC-ENTMCNC: 32.5 G/DL — SIGNIFICANT CHANGE UP (ref 32–37)
MCV RBC AUTO: 89.5 FL — SIGNIFICANT CHANGE UP (ref 81–99)
MONOCYTES # BLD AUTO: 0.42 K/UL — SIGNIFICANT CHANGE UP (ref 0.1–0.6)
MONOCYTES NFR BLD AUTO: 9.5 % — HIGH (ref 1.7–9.3)
NEUTROPHILS # BLD AUTO: 1.49 K/UL — SIGNIFICANT CHANGE UP (ref 1.4–6.5)
NEUTROPHILS NFR BLD AUTO: 33.8 % — LOW (ref 42.2–75.2)
NITRITE UR-MCNC: NEGATIVE — SIGNIFICANT CHANGE UP
NRBC # BLD: 0 /100 WBCS — SIGNIFICANT CHANGE UP (ref 0–0)
NT-PROBNP SERPL-SCNC: 13 PG/ML — SIGNIFICANT CHANGE UP (ref 0–300)
PH UR: 6.5 — SIGNIFICANT CHANGE UP (ref 5–8)
PLATELET # BLD AUTO: 207 K/UL — SIGNIFICANT CHANGE UP (ref 130–400)
POTASSIUM SERPL-MCNC: 4 MMOL/L — SIGNIFICANT CHANGE UP (ref 3.5–5)
POTASSIUM SERPL-SCNC: 4 MMOL/L — SIGNIFICANT CHANGE UP (ref 3.5–5)
PROT SERPL-MCNC: 6.8 G/DL — SIGNIFICANT CHANGE UP (ref 6–8)
PROT UR-MCNC: NEGATIVE — SIGNIFICANT CHANGE UP
RBC # BLD: 4.19 M/UL — LOW (ref 4.2–5.4)
RBC # FLD: 13.1 % — SIGNIFICANT CHANGE UP (ref 11.5–14.5)
SARS-COV-2 RNA SPEC QL NAA+PROBE: DETECTED
SODIUM SERPL-SCNC: 138 MMOL/L — SIGNIFICANT CHANGE UP (ref 135–146)
SP GR SPEC: 1.02 — SIGNIFICANT CHANGE UP (ref 1.01–1.03)
TROPONIN T SERPL-MCNC: <0.01 NG/ML — SIGNIFICANT CHANGE UP
UROBILINOGEN FLD QL: SIGNIFICANT CHANGE UP
WBC # BLD: 4.4 K/UL — LOW (ref 4.8–10.8)
WBC # FLD AUTO: 4.4 K/UL — LOW (ref 4.8–10.8)

## 2021-01-22 PROCEDURE — 71045 X-RAY EXAM CHEST 1 VIEW: CPT | Mod: 26

## 2021-01-22 PROCEDURE — 71275 CT ANGIOGRAPHY CHEST: CPT | Mod: 26

## 2021-01-22 PROCEDURE — 93010 ELECTROCARDIOGRAM REPORT: CPT

## 2021-01-22 RX ORDER — KETOROLAC TROMETHAMINE 30 MG/ML
30 SYRINGE (ML) INJECTION ONCE
Refills: 0 | Status: DISCONTINUED | OUTPATIENT
Start: 2021-01-22 | End: 2021-01-22

## 2021-01-22 RX ORDER — IBUPROFEN 200 MG
1 TABLET ORAL
Qty: 21 | Refills: 0
Start: 2021-01-22 | End: 2021-01-28

## 2021-01-22 RX ADMIN — Medication 30 MILLIGRAM(S): at 00:42

## 2021-01-22 NOTE — ED PROVIDER NOTE - ATTENDING CONTRIBUTION TO CARE
pt with hx of PE (provoked, flight, ocp) 5 yrs ago, finished ac course, c/o cp and sob. +for covid 3 days ago, sx for 10 days. no leg swelling.  pt in nad, ctab, rrr, ab soft, nt, nd. o2 sat 100% on ra. HR in 60s. nsr.  will get ekg, labs, imaging, reassess.

## 2021-01-22 NOTE — ED PROVIDER NOTE - OBJECTIVE STATEMENT
40 y.o F w/ pmhx provoked PE 5 years ago no longer on AC, recent COVID x 10 days, p/w worsening ARRIOLA, L sharp CP radiating to L shoulder similar to pt's previous PE, + palpitations, + cough, no more fevers. No abd pain, no n/v, no back pain, no neck stiffness, no vision changes, no numbness or tingling.

## 2021-01-22 NOTE — ED PROVIDER NOTE - PATIENT PORTAL LINK FT
You can access the FollowMyHealth Patient Portal offered by [facility] by registering at the following website: [email or web address]. By joining [employer]’s FollowMyHealth portal, you will also be able to view your health information using other applications (apps) compatible with our system.

## 2021-01-22 NOTE — ED PROVIDER NOTE - PHYSICAL EXAMINATION
CONSTITUTIONAL: well-appearing, in NAD  SKIN: Warm dry, normal skin turgor  HEAD: NCAT  EYES: EOMI, PERRLA, no scleral icterus, conjunctiva pink  ENT: normal pharynx with no erythema or exudates  NECK: Supple; non tender. Full ROM.  CARD: RRR, no murmurs.  RESP: clear to ausculation b/l. No crackles or wheezing.  ABD: soft, non-tender, non-distended, no rebound or guarding.  EXT: Full ROM, no bony tenderness, no pedal edema, no calf tenderness  NEURO: normal motor. normal sensory. CN II-XII intact.  PSYCH: Cooperative, appropriate.

## 2021-01-22 NOTE — ED PROVIDER NOTE - CARE PROVIDER_API CALL
Zaid Gupta   INTERNAL MEDICINE  [address] [place] [address]  Phone: [phone]  Fax: [phone]  Follow Up Time: 1-3 Days

## 2021-01-22 NOTE — ED PROVIDER NOTE - NSFOLLOWUPINSTRUCTIONS_ED_ALL_ED_FT
You are being discharged with viral illness diagnosis and do not require hospitalization.  At this time, only patients who are being hospitalized are tested for COVID-19.    If you are well enough to be discharged home and are not in a high risk group to be admitted, you should care for yourself at home exactly like you would if you have Influenza “flu”. Follow all the standard guidelines about washing your hands, covering your cough, etc. If you feel unwell, stay home, rest and drink plenty of clear fluids. Keep track of your symptoms.   You do need to remain home for at least 7 days from the onset of symptoms or 3 days after your fever is completely gone and your respiratory symptoms are better, whichever is longer. You should continue to isolate yourself.    If symptoms worsen or continue and you need to seek medical care, call your healthcare provider in advance, or 9-1-1 in an emergency, and let them know you are a close contact to a person with confirmed COVID-19  You should return to the Emergency Department if you develop worse symptoms, trouble breathing, chest pain, and/or a fever that doesn’t improve with over the counter medications.    Please consider going through the drive-through testing unless you are severely ill and need to go to the ED.  -through testing is available at various location, including [place].  Call [facility] at  [phone] to make an appointment.    How to Set Up Your Home for Self-Quarantine or Self-Isolation    Please refer to this helpful video.   [email or web address]

## 2021-01-22 NOTE — ED PROVIDER NOTE - NS ED ROS FT
Constitutional:  (-) fever, (-) chills, (-) lethargy  Eyes:  (-) eye pain (-) visual changes  ENMT: (-) nasal discharge, (-) sore throat. (-) neck pain or stiffness  Cardiac: (+) chest pain (+) palpitations  Respiratory:  (+) cough (-) respiratory distress.   GI:  (-) nausea (-) vomiting (-) diarrhea (-) abdominal pain.  :  (-) dysuria (-) frequency (-) burning.  MS:  (-) back pain (-) joint pain.  Neuro:  (+) headache (-) numbness (-) tingling (-) focal weakness  Skin:  (-) rash  Except as documented in the HPI,  all other systems are negative

## 2021-01-23 ENCOUNTER — TRANSCRIPTION ENCOUNTER (OUTPATIENT)
Age: 41
End: 2021-01-23

## 2021-01-23 LAB
CRP SERPL-MCNC: 0.94 MG/DL — HIGH (ref 0–0.4)
FERRITIN SERPL-MCNC: 38 NG/ML — SIGNIFICANT CHANGE UP (ref 15–150)
PROCALCITONIN SERPL-MCNC: 0.04 NG/ML — SIGNIFICANT CHANGE UP (ref 0.02–0.1)

## 2021-01-28 ENCOUNTER — EMERGENCY (EMERGENCY)
Facility: HOSPITAL | Age: 41
LOS: 0 days | Discharge: HOME | End: 2021-01-28
Attending: EMERGENCY MEDICINE | Admitting: EMERGENCY MEDICINE
Payer: MEDICAID

## 2021-01-28 ENCOUNTER — TRANSCRIPTION ENCOUNTER (OUTPATIENT)
Age: 41
End: 2021-01-28

## 2021-01-28 VITALS
DIASTOLIC BLOOD PRESSURE: 83 MMHG | SYSTOLIC BLOOD PRESSURE: 145 MMHG | OXYGEN SATURATION: 100 % | WEIGHT: 216.05 LBS | RESPIRATION RATE: 18 BRPM | TEMPERATURE: 98 F | HEIGHT: 66 IN | HEART RATE: 84 BPM

## 2021-01-28 DIAGNOSIS — U07.1 COVID-19: ICD-10-CM

## 2021-01-28 DIAGNOSIS — Z79.1 LONG TERM (CURRENT) USE OF NON-STEROIDAL ANTI-INFLAMMATORIES (NSAID): ICD-10-CM

## 2021-01-28 DIAGNOSIS — R20.0 ANESTHESIA OF SKIN: ICD-10-CM

## 2021-01-28 DIAGNOSIS — R07.9 CHEST PAIN, UNSPECIFIED: ICD-10-CM

## 2021-01-28 DIAGNOSIS — Z98.891 HISTORY OF UTERINE SCAR FROM PREVIOUS SURGERY: Chronic | ICD-10-CM

## 2021-01-28 DIAGNOSIS — Z90.49 ACQUIRED ABSENCE OF OTHER SPECIFIED PARTS OF DIGESTIVE TRACT: ICD-10-CM

## 2021-01-28 DIAGNOSIS — Z86.711 PERSONAL HISTORY OF PULMONARY EMBOLISM: ICD-10-CM

## 2021-01-28 DIAGNOSIS — M79.89 OTHER SPECIFIED SOFT TISSUE DISORDERS: ICD-10-CM

## 2021-01-28 DIAGNOSIS — Z88.0 ALLERGY STATUS TO PENICILLIN: ICD-10-CM

## 2021-01-28 DIAGNOSIS — R42 DIZZINESS AND GIDDINESS: ICD-10-CM

## 2021-01-28 DIAGNOSIS — Z98.891 HISTORY OF UTERINE SCAR FROM PREVIOUS SURGERY: ICD-10-CM

## 2021-01-28 DIAGNOSIS — M79.662 PAIN IN LEFT LOWER LEG: ICD-10-CM

## 2021-01-28 DIAGNOSIS — N93.9 ABNORMAL UTERINE AND VAGINAL BLEEDING, UNSPECIFIED: ICD-10-CM

## 2021-01-28 DIAGNOSIS — R20.2 PARESTHESIA OF SKIN: ICD-10-CM

## 2021-01-28 DIAGNOSIS — Z90.49 ACQUIRED ABSENCE OF OTHER SPECIFIED PARTS OF DIGESTIVE TRACT: Chronic | ICD-10-CM

## 2021-01-28 LAB
ALBUMIN SERPL ELPH-MCNC: 4.5 G/DL — SIGNIFICANT CHANGE UP (ref 3.5–5.2)
ALP SERPL-CCNC: 78 U/L — SIGNIFICANT CHANGE UP (ref 30–115)
ALT FLD-CCNC: 20 U/L — SIGNIFICANT CHANGE UP (ref 0–41)
ANION GAP SERPL CALC-SCNC: 11 MMOL/L — SIGNIFICANT CHANGE UP (ref 7–14)
AST SERPL-CCNC: 19 U/L — SIGNIFICANT CHANGE UP (ref 0–41)
BASE EXCESS BLDV CALC-SCNC: 3.1 MMOL/L — HIGH (ref -2–2)
BASOPHILS # BLD AUTO: 0.03 K/UL — SIGNIFICANT CHANGE UP (ref 0–0.2)
BASOPHILS NFR BLD AUTO: 0.5 % — SIGNIFICANT CHANGE UP (ref 0–1)
BILIRUB SERPL-MCNC: <0.2 MG/DL — SIGNIFICANT CHANGE UP (ref 0.2–1.2)
BUN SERPL-MCNC: 14 MG/DL — SIGNIFICANT CHANGE UP (ref 10–20)
CA-I SERPL-SCNC: 1.13 MMOL/L — SIGNIFICANT CHANGE UP (ref 1.12–1.3)
CALCIUM SERPL-MCNC: 9.6 MG/DL — SIGNIFICANT CHANGE UP (ref 8.5–10.1)
CHLORIDE SERPL-SCNC: 101 MMOL/L — SIGNIFICANT CHANGE UP (ref 98–110)
CO2 SERPL-SCNC: 26 MMOL/L — SIGNIFICANT CHANGE UP (ref 17–32)
CREAT SERPL-MCNC: 0.6 MG/DL — LOW (ref 0.7–1.5)
D DIMER BLD IA.RAPID-MCNC: 404 NG/ML DDU — HIGH (ref 0–230)
EOSINOPHIL # BLD AUTO: 0.11 K/UL — SIGNIFICANT CHANGE UP (ref 0–0.7)
EOSINOPHIL NFR BLD AUTO: 1.7 % — SIGNIFICANT CHANGE UP (ref 0–8)
GAS PNL BLDV: 139 MMOL/L — SIGNIFICANT CHANGE UP (ref 136–145)
GAS PNL BLDV: SIGNIFICANT CHANGE UP
GLUCOSE SERPL-MCNC: 129 MG/DL — HIGH (ref 70–99)
HCG SERPL QL: NEGATIVE — SIGNIFICANT CHANGE UP
HCO3 BLDV-SCNC: 29 MMOL/L — SIGNIFICANT CHANGE UP (ref 22–29)
HCT VFR BLD CALC: 38.6 % — SIGNIFICANT CHANGE UP (ref 37–47)
HCT VFR BLDA CALC: 42.5 % — SIGNIFICANT CHANGE UP (ref 34–44)
HGB BLD CALC-MCNC: 13.9 G/DL — LOW (ref 14–18)
HGB BLD-MCNC: 12.4 G/DL — SIGNIFICANT CHANGE UP (ref 12–16)
HOROWITZ INDEX BLDV+IHG-RTO: 21 — SIGNIFICANT CHANGE UP
IMM GRANULOCYTES NFR BLD AUTO: 0.9 % — HIGH (ref 0.1–0.3)
LACTATE BLDV-MCNC: 1.9 MMOL/L — HIGH (ref 0.5–1.6)
LYMPHOCYTES # BLD AUTO: 2.3 K/UL — SIGNIFICANT CHANGE UP (ref 1.2–3.4)
LYMPHOCYTES # BLD AUTO: 35.6 % — SIGNIFICANT CHANGE UP (ref 20.5–51.1)
MAGNESIUM SERPL-MCNC: 2.1 MG/DL — SIGNIFICANT CHANGE UP (ref 1.8–2.4)
MCHC RBC-ENTMCNC: 29 PG — SIGNIFICANT CHANGE UP (ref 27–31)
MCHC RBC-ENTMCNC: 32.1 G/DL — SIGNIFICANT CHANGE UP (ref 32–37)
MCV RBC AUTO: 90.4 FL — SIGNIFICANT CHANGE UP (ref 81–99)
MONOCYTES # BLD AUTO: 0.45 K/UL — SIGNIFICANT CHANGE UP (ref 0.1–0.6)
MONOCYTES NFR BLD AUTO: 7 % — SIGNIFICANT CHANGE UP (ref 1.7–9.3)
NEUTROPHILS # BLD AUTO: 3.51 K/UL — SIGNIFICANT CHANGE UP (ref 1.4–6.5)
NEUTROPHILS NFR BLD AUTO: 54.3 % — SIGNIFICANT CHANGE UP (ref 42.2–75.2)
NRBC # BLD: 0 /100 WBCS — SIGNIFICANT CHANGE UP (ref 0–0)
PCO2 BLDV: 46 MMHG — SIGNIFICANT CHANGE UP (ref 41–51)
PH BLDV: 7.4 — SIGNIFICANT CHANGE UP (ref 7.26–7.43)
PLATELET # BLD AUTO: 274 K/UL — SIGNIFICANT CHANGE UP (ref 130–400)
PO2 BLDV: 35 MMHG — SIGNIFICANT CHANGE UP (ref 20–40)
POTASSIUM BLDV-SCNC: 4.2 MMOL/L — SIGNIFICANT CHANGE UP (ref 3.3–5.6)
POTASSIUM SERPL-MCNC: 4.4 MMOL/L — SIGNIFICANT CHANGE UP (ref 3.5–5)
POTASSIUM SERPL-SCNC: 4.4 MMOL/L — SIGNIFICANT CHANGE UP (ref 3.5–5)
PROT SERPL-MCNC: 6.9 G/DL — SIGNIFICANT CHANGE UP (ref 6–8)
RAPID RVP RESULT: DETECTED
RBC # BLD: 4.27 M/UL — SIGNIFICANT CHANGE UP (ref 4.2–5.4)
RBC # FLD: 12.7 % — SIGNIFICANT CHANGE UP (ref 11.5–14.5)
SAO2 % BLDV: 64 % — SIGNIFICANT CHANGE UP
SARS-COV-2 RNA SPEC QL NAA+PROBE: DETECTED
SODIUM SERPL-SCNC: 138 MMOL/L — SIGNIFICANT CHANGE UP (ref 135–146)
TROPONIN T SERPL-MCNC: <0.01 NG/ML — SIGNIFICANT CHANGE UP
WBC # BLD: 6.46 K/UL — SIGNIFICANT CHANGE UP (ref 4.8–10.8)
WBC # FLD AUTO: 6.46 K/UL — SIGNIFICANT CHANGE UP (ref 4.8–10.8)

## 2021-01-28 PROCEDURE — 70450 CT HEAD/BRAIN W/O DYE: CPT | Mod: 26

## 2021-01-28 PROCEDURE — 99285 EMERGENCY DEPT VISIT HI MDM: CPT

## 2021-01-28 PROCEDURE — 93970 EXTREMITY STUDY: CPT | Mod: 26

## 2021-01-28 PROCEDURE — 71045 X-RAY EXAM CHEST 1 VIEW: CPT | Mod: 26

## 2021-01-28 PROCEDURE — 71275 CT ANGIOGRAPHY CHEST: CPT | Mod: 26

## 2021-01-28 RX ORDER — METOCLOPRAMIDE HCL 10 MG
10 TABLET ORAL ONCE
Refills: 0 | Status: COMPLETED | OUTPATIENT
Start: 2021-01-28 | End: 2021-01-28

## 2021-01-28 RX ORDER — SODIUM CHLORIDE 9 MG/ML
1000 INJECTION INTRAMUSCULAR; INTRAVENOUS; SUBCUTANEOUS ONCE
Refills: 0 | Status: COMPLETED | OUTPATIENT
Start: 2021-01-28 | End: 2021-01-28

## 2021-01-28 RX ADMIN — SODIUM CHLORIDE 1000 MILLILITER(S): 9 INJECTION INTRAMUSCULAR; INTRAVENOUS; SUBCUTANEOUS at 18:02

## 2021-01-28 RX ADMIN — Medication 10 MILLIGRAM(S): at 18:02

## 2021-01-28 NOTE — ED PROVIDER NOTE - PHYSICAL EXAMINATION
GENERAL: Well-nourished, Well-developed. NAD.  HEAD: Normocephalic, atraumatic  Eyes: PERRLA, EOMI illicits dizziness. No asymmetry. No nystagmus. No conjunctival injection. Non-icteric sclera.  Neck: Supple. No LAD. FROM  CVS: RRR. No reproducible chest wall tenderness. Normal S1,S2. No murmurs appreciated on auscultation   RESP: No use of accessory muscles. Chest rise symmetrical with good expansion. Lungs clear to auscultation B/L. No wheezing, rales, or rhonchi auscultated.  GI: Normal auscultation of bowel sounds in all 4 quadrants. Soft, Nontender, Nondistended. No guarding or rebound tenderness.   Skin: Warm, Dry. No rashes or lesions. Good cap refill < 2 sec B/L.  EXT: Radial and pedal pulses present B/L. (+) left calf tenderness, no swelling B/L. No palpable cords. No pedal edema B/L.  Neuro: AA&O x 3. CNs II-XII grossly intact. Speaking in full sentences. No slurring of speech. No facial droop. No tremors. Sensation grossly intact. Strength 5/5 B/L. Gait within normal limits. Negative Romberg and Pronator Drift. Normal Finger to nose exam. Visual fields intact.  Psych:  Appropriate mood and affect. Cooperative. Anxious.

## 2021-01-28 NOTE — ED PROVIDER NOTE - OBJECTIVE STATEMENT
41 yo F pmh PE (not on anticoags), diverticulitis, COVID (+) 1/21/21 presenting to the ED c/o persistent L sided HA, L sided numbness (Upper and lower extremities B/L), dizziness, Left calf pain/swelling, L sided chest pain x 1 week. Pt reports she was seen in the ED on 1/21, at that time CTA chest was negative for PE. Pt states the HA is left sided, throbbing, intermittent, moderately alleviated by Motrin. Pt reports the chest pain is sharp, stabbing, intermittent, radiates to her back, nonexertional. Pt describes her dizziness as the room spinning, worsened by head movement. Admits to chills and nausea. Denies fever, vomiting, abdominal pain, diarrhea, sob, neck pain, visual changes, cough, hemoptysis. 39 yo F pmh PE (not on anticoags), diverticulitis, COVID (+) 1/21/21 presenting to the ED c/o persistent L sided HA, L sided numbness (Upper and lower extremities B/L), dizziness, Left calf pain/swelling, L sided chest pain x 1 week. Pt reports she was seen in the ED on 1/21, at that time CTA chest was negative for PE. Pt states the HA is left sided, throbbing, intermittent, moderately alleviated by Motrin. Pt reports the chest pain is sharp, stabbing, intermittent, radiates to her back, nonexertional. Pt describes her dizziness as the room spinning, worsened by head movement. Pt also reports vaginal bleeding from 1/21/21- 1/25/21, bleeding has resolved at this time. LMP 1/1/21. Admits to chills and nausea. Denies fever, vomiting, abdominal pain, diarrhea, sob, neck pain, visual changes, cough, hemoptysis.

## 2021-01-28 NOTE — ED PROVIDER NOTE - NSFOLLOWUPCLINICS_GEN_ALL_ED_FT
[facility]  Medicine  [address], [place]   Phone: [phone]  Fax:     [facility]  OB/GYN  [address] [place] [address]  Phone: [phone]  Fax:     [facility]  Neurology  [address]  [place] [address]  Phone: [phone]  Fax:   Follow Up Time:

## 2021-01-28 NOTE — ED PROVIDER NOTE - NS ED ROS FT
Constitutional: (-) fever (+) malaise (-) diaphoresis (+) chills (-) wt. loss (+) body aches (-) night sweats  Eyes: (-) visual changes   ENMT: (-) nasal or chest congestion (-) runny nose (-) sore throat (-) hoarseness  (-) hearing changes (-) ear pain (-) ear discharge or infections (-) neck pain (-) neck stiffness  Cardiac: (+) chest pain  (-) palpitations (-) syncope (-) edema  Respiratory: (-) cough (-) hemoptysis (-) SOB (-) LPOEZ  GI: (+) nausea (-) vomiting (-) diarrhea (-) abdominal pain   : (-) dysuria (-) increased frequency  (-) hematuria (-) incontinence  MS: (+) back pain (-) myalgia (-) muscle weakness (-)  joint pain  Neuro: (+) headache (+) dizziness (-) numbness/tingling (-) weakness (-) LOC (-) head injury   Skin: (-) rash (-) laceration    Except as documented in the HPI, all other systems are negative. Constitutional: (-) fever (+) malaise (-) diaphoresis (+) chills (-) wt. loss (+) body aches (-) night sweats  Eyes: (-) visual changes   ENMT: (-) nasal or chest congestion (-) runny nose (-) sore throat (-) hoarseness  (-) hearing changes (-) ear pain (-) ear discharge or infections (-) neck pain (-) neck stiffness  Cardiac: (+) chest pain  (-) palpitations (-) syncope (-) edema  Respiratory: (-) cough (-) hemoptysis (-) SOB (-) LOPEZ  GI: (+) nausea (-) vomiting (-) diarrhea (-) abdominal pain   : (-) dysuria (-) increased frequency  (-) hematuria (-) incontinence  MS: (+) back pain (-) myalgia (-) muscle weakness (-)  joint pain  Neuro: (+) headache (+) dizziness (-) numbness/tingling (-) weakness (-) LOC (-) head injury   Skin: (-) rash (-) laceration      Except as documented in the HPI, all other systems are negative.

## 2021-01-28 NOTE — ED PROVIDER NOTE - NSFOLLOWUPINSTRUCTIONS_ED_ALL_ED_FT
SEE YOUR DOCTORS IN THE NEXT 24-48 HOURS   RETURN FOR ANY CONCERNS     Chest Pain    Chest pain can be caused by many different conditions which may or may not be dangerous. Causes include heartburn, lung infections, heart attack, blood clot in lungs, skin infections, strain or damage to muscle, cartilage, or bones, etc. In addition to a history and physical examination, an electrocardiogram (ECG) or other lab tests may have been performed to determine the cause of your chest pain. Follow up with your primary care provider or with a cardiologist as instructed.     SEEK IMMEDIATE MEDICAL CARE IF YOU HAVE ANY OF THE FOLLOWING SYMPTOMS: worsening chest pain, coughing up blood, unexplained back/neck/jaw pain, severe abdominal pain, dizziness or lightheadedness, fainting, shortness of breath, sweaty or clammy skin, vomiting, or racing heart beat. These symptoms may represent a serious problem that is an emergency. Do not wait to see if the symptoms will go away. Get medical help right away. Call 911 and do not drive yourself to the hospital

## 2021-01-28 NOTE — ED PROVIDER NOTE - CLINICAL SUMMARY MEDICAL DECISION MAKING FREE TEXT BOX
fs: Shane has improved at this time pain free we obtained ekg which reveals inverted twaves in v1, unchanged from prior with recent admission for chest pain, in addition tested + covid, she has had chest pain presents with numbness and tingling of her left upper and lower extremity for 7 days on exam she has no focal deficits, from, sensation intact with no changes in co-ordination or ability to walk.    given her history we obtained ekg, labs including ddimer which is elevated as well as b/l dvt study which is negative ct chest which is negative and improved from an infection perspective   patient notes that she has no further vaginal bleeding as well.  I will discharge at this time with follow up to cardiology, neurology and ob/gyn

## 2021-01-28 NOTE — ED PROVIDER NOTE - PATIENT PORTAL LINK FT
You can access the FollowMyHealth Patient Portal offered by [facility] by registering at the following website: [email or web address]. By joining [employer]’s FollowMyHealth portal, you will also be able to view your health information using other applications (apps) compatible with our system.

## 2021-01-28 NOTE — ED ADULT TRIAGE NOTE - HEIGHT IN FEET
"[id number]    D: Therapist received call for consult by [facility] staff, JENNY Meyer, MD Allan and VANNA Mitchell.  Clinician met with patient at bedside.  Patient did not require security monitoring during assessment.  Patient is a 39 year old, white female [place].  Patient lives in a sober living home x 2 years, after completing a stay in Residential substance abuse program ([facility]) x 1 year.  Sobriety date is 4/22/15.  Patient is a [place] native, 1 sister.  She is accompanied with her friend, Santa, who is the director of [facility].  Patient asked for Santa to remain in the room during assessment, as the patient appears paranoid and nervous. Patient presents to [facility] today due to feeling as though she is becoming out of control of herself due to what she describes as a manic episode.  Per the patient, she has a standing Dx of Bipolar disorder.  She sees Dr. Amy Roland for PCP, as well as Naima Savage LCSW who is contracted through her recovery program, as outpatient providers.  Patient reports various hospitalizations throughout her life, most recently in September 2017 @ [facility] for suicidal ideations. Nicole states she has had several medications adjustments throughout her life and recently had been doing well on the following through her PCP: Proventil, Avapro, Lamictal, Toprol XL, Nicorette, and Trintellix.  Patient reports she recently within the last 2 weeks went to her PCP and Dr. Roland increased her Trintellix from 10MG to 15 MG, also started her on Chantix for smoking cessation. Patient reports that she started experiencing symptoms of being \"thrown into a manic episode.\"  She is notably pacing around the room, fidgety hands and feet, pressured speech.  Nicole describes her mood as \"I feel like a peanut M&M, I have my shell but my insides are melting everywhere.\"  Nicole goes on to describe experiencing hypersexual thoughts, urges, and desires, as well as elevated mood and feelings " "of \"being on top of the world, like I want to get higher but I can't, and if I do, it's going to be bad for me.\" She goes on to describe several concerning behaviors that she has engaged in such as: hypersexual urges, calling and organizing group sex at a \" and S&M house\" with prostitues, also searching for medication prescriptions to help induce further manic symptoms.  Nicole states in the past she had a bad reaction to Prozac, which induced feelings of carlos manuel, so she was searching for Prozac to further elevate her mood because of how good she feels currently.  She was also found by the director of her program, Santa, to have been asking for Wellbutrin to take to also induce further the feeling of carlos manuel. Per Santa, the patient has not slept in 48 hours.  Her appetite appears fair, she has ate 2 sandwich lunch bags here in ER.  She is slightly irritable with me, stating she enjoyed the feeling of carlos manuel but knows if she doesn't get help now, it will be \"bad for me.\"  She states, \"I need to go somewhere to have them adjust my medications and watch me so it is done carefully and nothing bad happens.\"  Santa reports when she noticed the patient medication seeking to further induce her carlos manuel, the patient reportedly handed over her medications to Santa and asked to be see by her PCP immediately.  They presented to Dr. Roland's office, who recommended the patient present to [facility] ER and recommended evaluation for psych admission.  She denies AVH/SI/HI.     A: Upon arrival, patient is alert and oriented x 4.  Patient denies any visual/audio/tactile hallucinations and does not appear to be experiencing any perceptual disturbances or responding to internal stimuli.  The patient presents with an anxious affect, mood is irritable at times.  Patient’s appearance is well groomed.  Patient is minimally cooperative with assessment.  Eye contact appears poor.  Speech is slightly pressured, although clear and organized.  I do not " notice flight of ideas quite yet but definitely some distractibility as far as engaging in assessment, she was minimally engaged. Cognitions appear to be rational, although hypersexual and somewhat paranoid, is concerned about me asking so many questions, is irritable at me for asking, etc.  The patient’s body language appears anxious, fidgety, with increase of motor impairments as she does pace the room. The patient denies any SI. CSSRS administered for suicide risk.  Results suggest the patient is denying any suicidal ideations or intention.  Patient denies any violent thoughts, actions, or homicidal ideations.  Insight and judgement appear fair as she is self aware of how she is feeling, able to identify the fact she feels compelled to induce her carlos manuel further because she likes the feeling, although can identify this is dangerous behavior and is seeking help.  In regards to substance abuse, the patient denies any etoh or illicit drug use.  Patient’s Utox is suggestively negative for all substances, reports hx of drug and etoh use with sobriety date of 4/22/15.      P: Given the above clinical assessment, I recommend a referral be sent for the psychiatrist to review for potential admission.  Patient is agreeable for referral to [facility].  I staffed this case with [facility] medical treatment team, JENNY Meyer, MD Allan,  And VANNA Mitchell.  All parties agreeable to moving forward with plan.  I faxed the appropriate clinical paperwork to [facility], which I confirmed was received by Judy Lincoln RN.  The patient was accepted to [facility] by Dr. Gilbert Lopez. [facility] was contacted for transportation, reporting an ETA of 2330 to [facility].  I provided this information to the treatment team, gave VANNA Mitchell info to facilitate calling report.  All parties aware and agreeable to patient disposition.       -DEENA Young.   5

## 2021-01-28 NOTE — ED PROVIDER NOTE - ATTENDING CONTRIBUTION TO CARE
I was present for and supervised the key and critical aspects of the procedures performed during the care of the patient. fs: Shane has improved at this time pain free we obtained ekg which reveals inverted twaves in v1, unchanged from prior with recent admission for chest pain, in addition tested + covid, she has had chest pain presents with numbness and tingling of her left upper and lower extremity for 7 days on exam she has no focal deficits, from, sensation intact with no changes in co-ordination or ability to walk.    on physical exam the patient is nc/at perrla eomi oropharynx clear cta b/l, rrr s1s2 noted abd-soft no guarding no rebound ext from no edema pedal pulses 2 +=, radial pulses 2 +=     given her history we obtained ekg, labs including ddimer which is elevated as well as b/l dvt study which is negative ct chest which is negative and improved from an infection perspective   patient notes that she has no further vaginal bleeding as well.  I will discharge at this time with follow up to cardiology, neurology and ob/gyn

## 2021-02-01 ENCOUNTER — TRANSCRIPTION ENCOUNTER (OUTPATIENT)
Age: 41
End: 2021-02-01

## 2021-02-02 ENCOUNTER — TRANSCRIPTION ENCOUNTER (OUTPATIENT)
Age: 41
End: 2021-02-02

## 2021-02-03 ENCOUNTER — TRANSCRIPTION ENCOUNTER (OUTPATIENT)
Age: 41
End: 2021-02-03

## 2021-02-06 ENCOUNTER — EMERGENCY (EMERGENCY)
Facility: HOSPITAL | Age: 41
LOS: 0 days | Discharge: HOME | End: 2021-02-06
Attending: STUDENT IN AN ORGANIZED HEALTH CARE EDUCATION/TRAINING PROGRAM | Admitting: STUDENT IN AN ORGANIZED HEALTH CARE EDUCATION/TRAINING PROGRAM
Payer: MEDICAID

## 2021-02-06 VITALS
TEMPERATURE: 98 F | WEIGHT: 220.02 LBS | RESPIRATION RATE: 18 BRPM | HEIGHT: 66 IN | OXYGEN SATURATION: 97 % | SYSTOLIC BLOOD PRESSURE: 134 MMHG | DIASTOLIC BLOOD PRESSURE: 69 MMHG | HEART RATE: 100 BPM

## 2021-02-06 DIAGNOSIS — M79.669 PAIN IN UNSPECIFIED LOWER LEG: ICD-10-CM

## 2021-02-06 DIAGNOSIS — Z88.0 ALLERGY STATUS TO PENICILLIN: ICD-10-CM

## 2021-02-06 DIAGNOSIS — M79.662 PAIN IN LEFT LOWER LEG: ICD-10-CM

## 2021-02-06 DIAGNOSIS — U07.1 COVID-19: ICD-10-CM

## 2021-02-06 DIAGNOSIS — Z98.891 HISTORY OF UTERINE SCAR FROM PREVIOUS SURGERY: Chronic | ICD-10-CM

## 2021-02-06 DIAGNOSIS — Z90.49 ACQUIRED ABSENCE OF OTHER SPECIFIED PARTS OF DIGESTIVE TRACT: Chronic | ICD-10-CM

## 2021-02-06 LAB
ALBUMIN SERPL ELPH-MCNC: 4.5 G/DL — SIGNIFICANT CHANGE UP (ref 3.5–5.2)
ALP SERPL-CCNC: 69 U/L — SIGNIFICANT CHANGE UP (ref 30–115)
ALT FLD-CCNC: 15 U/L — SIGNIFICANT CHANGE UP (ref 0–41)
ANION GAP SERPL CALC-SCNC: 14 MMOL/L — SIGNIFICANT CHANGE UP (ref 7–14)
APTT BLD: 33 SEC — SIGNIFICANT CHANGE UP (ref 27–39.2)
AST SERPL-CCNC: 17 U/L — SIGNIFICANT CHANGE UP (ref 0–41)
BASOPHILS # BLD AUTO: 0.04 K/UL — SIGNIFICANT CHANGE UP (ref 0–0.2)
BASOPHILS NFR BLD AUTO: 0.6 % — SIGNIFICANT CHANGE UP (ref 0–1)
BILIRUB SERPL-MCNC: <0.2 MG/DL — SIGNIFICANT CHANGE UP (ref 0.2–1.2)
BUN SERPL-MCNC: 15 MG/DL — SIGNIFICANT CHANGE UP (ref 10–20)
CALCIUM SERPL-MCNC: 9.6 MG/DL — SIGNIFICANT CHANGE UP (ref 8.5–10.1)
CHLORIDE SERPL-SCNC: 103 MMOL/L — SIGNIFICANT CHANGE UP (ref 98–110)
CO2 SERPL-SCNC: 23 MMOL/L — SIGNIFICANT CHANGE UP (ref 17–32)
CREAT SERPL-MCNC: 0.6 MG/DL — LOW (ref 0.7–1.5)
D DIMER BLD IA.RAPID-MCNC: 365 NG/ML DDU — HIGH (ref 0–230)
EOSINOPHIL # BLD AUTO: 0.13 K/UL — SIGNIFICANT CHANGE UP (ref 0–0.7)
EOSINOPHIL NFR BLD AUTO: 1.9 % — SIGNIFICANT CHANGE UP (ref 0–8)
GLUCOSE SERPL-MCNC: 136 MG/DL — HIGH (ref 70–99)
HCG SERPL QL: NEGATIVE — SIGNIFICANT CHANGE UP
HCT VFR BLD CALC: 37.6 % — SIGNIFICANT CHANGE UP (ref 37–47)
HGB BLD-MCNC: 12.3 G/DL — SIGNIFICANT CHANGE UP (ref 12–16)
IMM GRANULOCYTES NFR BLD AUTO: 0.6 % — HIGH (ref 0.1–0.3)
INR BLD: 0.98 RATIO — SIGNIFICANT CHANGE UP (ref 0.65–1.3)
LYMPHOCYTES # BLD AUTO: 2.13 K/UL — SIGNIFICANT CHANGE UP (ref 1.2–3.4)
LYMPHOCYTES # BLD AUTO: 31.7 % — SIGNIFICANT CHANGE UP (ref 20.5–51.1)
MCHC RBC-ENTMCNC: 29.1 PG — SIGNIFICANT CHANGE UP (ref 27–31)
MCHC RBC-ENTMCNC: 32.7 G/DL — SIGNIFICANT CHANGE UP (ref 32–37)
MCV RBC AUTO: 88.9 FL — SIGNIFICANT CHANGE UP (ref 81–99)
MONOCYTES # BLD AUTO: 0.41 K/UL — SIGNIFICANT CHANGE UP (ref 0.1–0.6)
MONOCYTES NFR BLD AUTO: 6.1 % — SIGNIFICANT CHANGE UP (ref 1.7–9.3)
NEUTROPHILS # BLD AUTO: 3.96 K/UL — SIGNIFICANT CHANGE UP (ref 1.4–6.5)
NEUTROPHILS NFR BLD AUTO: 59.1 % — SIGNIFICANT CHANGE UP (ref 42.2–75.2)
NRBC # BLD: 0 /100 WBCS — SIGNIFICANT CHANGE UP (ref 0–0)
NT-PROBNP SERPL-SCNC: 24 PG/ML — SIGNIFICANT CHANGE UP (ref 0–300)
PLATELET # BLD AUTO: 282 K/UL — SIGNIFICANT CHANGE UP (ref 130–400)
POTASSIUM SERPL-MCNC: 4.2 MMOL/L — SIGNIFICANT CHANGE UP (ref 3.5–5)
POTASSIUM SERPL-SCNC: 4.2 MMOL/L — SIGNIFICANT CHANGE UP (ref 3.5–5)
PROT SERPL-MCNC: 7.1 G/DL — SIGNIFICANT CHANGE UP (ref 6–8)
PROTHROM AB SERPL-ACNC: 11.3 SEC — SIGNIFICANT CHANGE UP (ref 9.95–12.87)
RBC # BLD: 4.23 M/UL — SIGNIFICANT CHANGE UP (ref 4.2–5.4)
RBC # FLD: 12.9 % — SIGNIFICANT CHANGE UP (ref 11.5–14.5)
SODIUM SERPL-SCNC: 140 MMOL/L — SIGNIFICANT CHANGE UP (ref 135–146)
TROPONIN T SERPL-MCNC: <0.01 NG/ML — SIGNIFICANT CHANGE UP
WBC # BLD: 6.71 K/UL — SIGNIFICANT CHANGE UP (ref 4.8–10.8)
WBC # FLD AUTO: 6.71 K/UL — SIGNIFICANT CHANGE UP (ref 4.8–10.8)

## 2021-02-06 PROCEDURE — 93970 EXTREMITY STUDY: CPT | Mod: 26

## 2021-02-06 PROCEDURE — 99285 EMERGENCY DEPT VISIT HI MDM: CPT

## 2021-02-06 PROCEDURE — 93010 ELECTROCARDIOGRAM REPORT: CPT

## 2021-02-06 NOTE — ED PROVIDER NOTE - PHYSICAL EXAMINATION
Constitutional: Well developed, well nourished. NAD. Good general hygiene  Head: Atraumatic.  Eyes: PERRLA. EOMI without discomfort.   ENT: No nasal discharge. Mucous membranes moist.  Neck: Supple. Painless ROM.  Cardiovascular: Regular rhythm. Regular rate. Normal S1 and S2. No murmurs. 2+ pulses in all extremities.   Pulmonary: Normal respiratory rate and effort. Lungs clear to auscultation bilaterally. No wheezing, rales, or rhonchi. Bilateral, equal lung expansion.   Abdominal: Soft. Nondistended. Nontender. No rebound or guarding.   Extremities: L calf area of induration and swelling. No edema b/l.   Skin: No rashes.   Neuro: AAOx3. No focal neurological deficits.

## 2021-02-06 NOTE — ED PROVIDER NOTE - CLINICAL SUMMARY MEDICAL DECISION MAKING FREE TEXT BOX
I personally evaluated the patient. I reviewed the Resident’s or Physician Assistant’s note (as assigned above), and agree with the findings and plan except as documented in my note. Patient evaluated for L leg pain. Duplex and labs performed in ED. Prelim duplex negative. Instructed to f/u with pmd. I have fully discussed the medical management and delivery of care with the patient. I have discussed any available labs, imaging and treatment options with the patient. Patient confirms understanding and has been given detailed return precautions. Patient instructed to return to the ED should symptoms persist or worsen. Patient has demonstrated capacity and has verbalized understanding. Patient is well appearing upon discharge.

## 2021-02-06 NOTE — ED PROVIDER NOTE - ATTENDING CONTRIBUTION TO CARE
39 yo F pmh PE not currently on AC, recent covid pw LLE swelling. LLE swelling for the past few days, associated with L calf pain. No cp, no sob, no abd pain, no back pain, no palpitations, no headache, no decreased rom, no numbness/tingling.    CONSTITUTIONAL: Well-developed; well-nourished; in no acute distress. Sitting up and providing appropriate history and physical examination  SKIN: skin exam is warm and dry, no acute rash.  HEAD: Normocephalic; atraumatic.  EYES: PERRL, 3 mm bilateral, no nystagmus, EOM intact; conjunctiva and sclera clear.  ENT: No nasal discharge; airway clear.  NECK: Supple; non tender. + full passive ROM in all directions. No JVD  CARD: S1, S2 normal; no murmurs, gallops, or rubs. Regular rate and rhythm. + Symmetric Strong Pulses  RESP: No wheezes, rales or rhonchi. Good air movement bilaterally  ABD: soft; non-distended; non-tender. No Rebound, No Guarding, No signs of peritonitis, No CVA tenderness. No pulsatile abdominal mass. + Strong and Symmetric Pulses  EXT: +ttp over L calf. Normal ROM. No clubbing, cyanosis or edema. Dp and Pt Pulses intact. Cap refill less than 3 seconds  NEURO: CN 2-12 intact, normal finger to nose, normal romberg, stable gait, no sensory or motor deficits, Alert, oriented, grossly unremarkable. No Focal deficits. GCS 15. NIH 0  PSYCH: Cooperative, appropriate.

## 2021-02-06 NOTE — ED PROVIDER NOTE - PROGRESS NOTE DETAILS
AA: D/w vascular tech -- negative prelim. Will dc. Return precautions given to pt to return for worsening s/s, CP, SOB, lightheadedness, syncope, lethargy.

## 2021-02-06 NOTE — ED PROVIDER NOTE - NSFOLLOWUPCLINICS_GEN_ALL_ED_FT
[facility]  Medicine  [address], [place]   Phone: [phone]  Fax:   Follow Up Time: 1-3 Days

## 2021-02-06 NOTE — ED PROVIDER NOTE - OBJECTIVE STATEMENT
40F h/o PE not on AC, recent COVID in January p/w LLE pain and swelling. onset since COVID. Pain is mild in severity, dull, throbbing in nature, constant, non-radiating. Worse w/ palpation. Denies fever/chills, cough, sore throat, CP, dyspnea, abd pain, n/v/d.

## 2021-02-06 NOTE — ED PROVIDER NOTE - NS ED ROS FT
General: No fever, chills, or weakness.  Eyes:  No visual changes, eye pain or discharge.  ENMT:  No hearing changes, pain, no sore throat or runny nose, no difficulty swallowing  Cardiac:  No chest pain, SOB or edema. No chest pain with exertion.  Respiratory:  No cough or respiratory distress. No hemoptysis. No history of asthma or RAD.  GI:  No nausea, vomiting, diarrhea or abdominal pain.  :  No dysuria, frequency or burning.  MS: LLE pain. No swelling.  Neuro:  No headache.  No LOC. No change in ambulation. No dizziness.  Skin:  No skin rash.

## 2021-02-06 NOTE — ED PROVIDER NOTE - PATIENT PORTAL LINK FT
You can access the FollowMyHealth Patient Portal offered by [facility] by registering at the following website: [email or web address]. By joining [employer]’s FollowMyHealth portal, you will also be able to view your health information using other applications (apps) compatible with our system.

## 2021-02-06 NOTE — ED ADULT TRIAGE NOTE - CHIEF COMPLAINT QUOTE
Pt c/o left calk swelling, redness, and warmth. Pt has hx PE. Pt had +covid in 1/10. Denies SOB. Pt c/o left calf swelling, redness, and warmth. Pt has hx PE. Pt had +covid in 1/10. Denies SOB.

## 2021-03-15 ENCOUNTER — APPOINTMENT (OUTPATIENT)
Dept: NEUROLOGY | Facility: CLINIC | Age: 41
End: 2021-03-15
Payer: MEDICAID

## 2021-03-15 VITALS
BODY MASS INDEX: 34.96 KG/M2 | HEIGHT: 66 IN | DIASTOLIC BLOOD PRESSURE: 75 MMHG | HEART RATE: 40 BPM | OXYGEN SATURATION: 98 % | SYSTOLIC BLOOD PRESSURE: 118 MMHG | WEIGHT: 217.5 LBS

## 2021-03-15 DIAGNOSIS — R51.9 HEADACHE, UNSPECIFIED: ICD-10-CM

## 2021-03-15 DIAGNOSIS — U07.1 COVID-19: ICD-10-CM

## 2021-03-15 PROCEDURE — 99203 OFFICE O/P NEW LOW 30 MIN: CPT

## 2021-03-15 PROCEDURE — 99072 ADDL SUPL MATRL&STAF TM PHE: CPT

## 2021-03-15 NOTE — HISTORY OF PRESENT ILLNESS
[FreeTextEntry1] : Patient is 41 yo Female RH dominant, with PMHx migraines for many years, pulmonary embolism on oral contraceptives about 6-7 years ago, low blood pressure, spinal stenosis of lower back; Family Hx of cancer (uterine cancer in mother) as well as blood clots (mother with blood clots in the legs) who was infected with COVID in 1/2021, not hospitalized.  Three to four days after diagnosis of COVID, she had an episode of blacking out.  She was able to hear, but had difficulty with speech; she had whole body convulsion, fell onto the bed. This lasted more than 5 minutes. She went to the hospital. At the hospital, she was talking, but speech was labored.  CTH was negative, no PE was found on CTA. She went home from the hospital on the same day. Afterwards, she had persistent headache and went to [facility] for left sided weakness where they recommended f/u with neurology.  Currently, she has migraine headache b/l with aura, dizziness. She states that she had left sided weakness is present but less. She admits to difficulty concentrating and feels that her "brain is slower."

## 2021-03-15 NOTE — REASON FOR VISIT
[FreeTextEntry1] : Patient is 39 yo F for initial evaluation after COVID in 1/2021 with ?TIA vs seizure

## 2021-03-15 NOTE — REVIEW OF SYSTEMS
[Feeling Tired] : feeling tired [Decr. Concentrating Ability] : decreased concentrating ability [Dizziness] : dizziness [As Noted in HPI] : as noted in HPI [Negative] : Heme/Lymph

## 2021-03-15 NOTE — END OF VISIT
[Time Spent: ___ minutes] : I have spent [unfilled] minutes of time on the encounter. [FreeTextEntry3] : Patient seen and examined with JOSE Chao and agree with above except as noted.  Patients history obtained by me in mix of Latvian and English.  Patient has multiple complaints including cognitive slowing, left sided weakness, neck pain, syncopal episode.\par Exam suggests crossed sensory disturbances in extremities suggesting possible cervical spinal stenosis, given her history of DVT and PE and recent COVID infection may also suggest underlying central etiology such as stroke.\par \par Plan as above

## 2021-03-15 NOTE — PHYSICAL EXAM
[FreeTextEntry1] : Focal neurological exam:\par \par MS: Awake, alert, oriented to person, place, situation and time. Normal affect. Follows commands. \par \par Language: Speech is clear, fluent with good repetition & comprehension. No dysarthria. \par \par CNs 2 - 12 intact. EOMI no nystagmus, no diplopia. No facial asymmetry b/l, full eye closure strength b/l. Hearing grossly normal. Head turning & shoulder shrug intact b/l. Tongue midline, normal movements, no atrophy.\par \par Motor: Normal muscle bulk & tone. No noticeable tremor or seizure. No pronator drift. Muscle strength of b/l UE and b/l LE 5/5. \par \par Reflexes: DTR of biceps, knee and ankle brisker on the Left. \par \par Sensation: Intact to LT. Temperature sensation stronger on Right. Greater vibration sensation on the Left.\par \par Cortical: No extinction\par \par Coordination: No dysmetria to FTN.\par \par Gait: No postural instability. Normal stance and tandem gait.\par \par General: Alert and oriented to person, place, time, and situation. In no acute distress. Cooperative. Follows commands. \par Eyes: Sclera and conjunctiva were normal and pupils were equal in size, round, reactive to light, with normal accommodation\par ENT: Ears and nose normal in appearance. \par Vascular: No peripheral edema.\par Respiratory: No visible respiratory distress. \par Musculoskeletal: No involuntary movements were seen.\par Skin: Normal skin color and pigmentation.\par \par \par \par \par

## 2021-03-15 NOTE — DISCUSSION/SUMMARY
[FreeTextEntry1] : Patient is 41 yo Female RH dominant, with PMHx migraines, pulmonary embolism on oral contraceptives about 6-7 years ago who was infected with COVID in 1/2021, not hospitalized.  She had an episode of blacking out, questionable TIA vs hypotensive convulsion. Currently, she has migraine headache b/l with aura, dizziness.  She admits to difficulty concentrating and feels that her "brain is slower." Patient said she had spinal stenosis in her lower back. Exam was negative for left sided weakness, but had crossed sensory recognition, vibration stronger on the left and temperature better on right. Brisker DTR on the Left. \par \par PLAN:\par -MRI non contrast Head\par -MRI non contrast Neck \par -Routine 24 hour ambulatory EEG\par -Discontinue ASA 81\par -f/u event monitor\par \par

## 2021-04-05 ENCOUNTER — APPOINTMENT (OUTPATIENT)
Dept: NEUROLOGY | Facility: CLINIC | Age: 41
End: 2021-04-05
Payer: MEDICAID

## 2021-04-05 PROCEDURE — 99072 ADDL SUPL MATRL&STAF TM PHE: CPT

## 2021-04-05 PROCEDURE — 95816 EEG AWAKE AND DROWSY: CPT

## 2021-04-06 ENCOUNTER — EMERGENCY (EMERGENCY)
Facility: HOSPITAL | Age: 41
LOS: 0 days | Discharge: LEFT BEFORE DISCHARGE | End: 2021-04-07
Attending: EMERGENCY MEDICINE | Admitting: EMERGENCY MEDICINE
Payer: MEDICAID

## 2021-04-06 ENCOUNTER — APPOINTMENT (OUTPATIENT)
Dept: NEUROLOGY | Facility: CLINIC | Age: 41
End: 2021-04-06
Payer: MEDICAID

## 2021-04-06 VITALS
DIASTOLIC BLOOD PRESSURE: 75 MMHG | OXYGEN SATURATION: 98 % | SYSTOLIC BLOOD PRESSURE: 145 MMHG | HEART RATE: 74 BPM | RESPIRATION RATE: 18 BRPM

## 2021-04-06 VITALS — WEIGHT: 216.05 LBS

## 2021-04-06 DIAGNOSIS — Z98.891 HISTORY OF UTERINE SCAR FROM PREVIOUS SURGERY: Chronic | ICD-10-CM

## 2021-04-06 DIAGNOSIS — Z87.19 PERSONAL HISTORY OF OTHER DISEASES OF THE DIGESTIVE SYSTEM: ICD-10-CM

## 2021-04-06 DIAGNOSIS — U07.1 COVID-19: ICD-10-CM

## 2021-04-06 DIAGNOSIS — Z86.711 PERSONAL HISTORY OF PULMONARY EMBOLISM: ICD-10-CM

## 2021-04-06 DIAGNOSIS — Z90.49 ACQUIRED ABSENCE OF OTHER SPECIFIED PARTS OF DIGESTIVE TRACT: Chronic | ICD-10-CM

## 2021-04-06 DIAGNOSIS — R51.9 HEADACHE, UNSPECIFIED: ICD-10-CM

## 2021-04-06 DIAGNOSIS — Z90.49 ACQUIRED ABSENCE OF OTHER SPECIFIED PARTS OF DIGESTIVE TRACT: ICD-10-CM

## 2021-04-06 DIAGNOSIS — Z88.0 ALLERGY STATUS TO PENICILLIN: ICD-10-CM

## 2021-04-06 DIAGNOSIS — Z79.2 LONG TERM (CURRENT) USE OF ANTIBIOTICS: ICD-10-CM

## 2021-04-06 DIAGNOSIS — Z79.899 OTHER LONG TERM (CURRENT) DRUG THERAPY: ICD-10-CM

## 2021-04-06 DIAGNOSIS — R29.810 FACIAL WEAKNESS: ICD-10-CM

## 2021-04-06 DIAGNOSIS — Z86.69 PERSONAL HISTORY OF OTHER DISEASES OF THE NERVOUS SYSTEM AND SENSE ORGANS: ICD-10-CM

## 2021-04-06 LAB
ALBUMIN SERPL ELPH-MCNC: 4.7 G/DL — SIGNIFICANT CHANGE UP (ref 3.5–5.2)
ALP SERPL-CCNC: 86 U/L — SIGNIFICANT CHANGE UP (ref 30–115)
ALT FLD-CCNC: 24 U/L — SIGNIFICANT CHANGE UP (ref 0–41)
ANION GAP SERPL CALC-SCNC: 12 MMOL/L — SIGNIFICANT CHANGE UP (ref 7–14)
APTT BLD: 31.2 SEC — SIGNIFICANT CHANGE UP (ref 27–39.2)
AST SERPL-CCNC: 29 U/L — SIGNIFICANT CHANGE UP (ref 0–41)
BASOPHILS # BLD AUTO: 0.04 K/UL — SIGNIFICANT CHANGE UP (ref 0–0.2)
BASOPHILS NFR BLD AUTO: 0.5 % — SIGNIFICANT CHANGE UP (ref 0–1)
BILIRUB SERPL-MCNC: 0.3 MG/DL — SIGNIFICANT CHANGE UP (ref 0.2–1.2)
BUN SERPL-MCNC: 12 MG/DL — SIGNIFICANT CHANGE UP (ref 10–20)
CALCIUM SERPL-MCNC: 9.8 MG/DL — SIGNIFICANT CHANGE UP (ref 8.5–10.1)
CHLORIDE SERPL-SCNC: 99 MMOL/L — SIGNIFICANT CHANGE UP (ref 98–110)
CHOLEST SERPL-MCNC: 168 MG/DL — SIGNIFICANT CHANGE UP
CO2 SERPL-SCNC: 22 MMOL/L — SIGNIFICANT CHANGE UP (ref 17–32)
CREAT SERPL-MCNC: 0.7 MG/DL — SIGNIFICANT CHANGE UP (ref 0.7–1.5)
EOSINOPHIL # BLD AUTO: 0.13 K/UL — SIGNIFICANT CHANGE UP (ref 0–0.7)
EOSINOPHIL NFR BLD AUTO: 1.6 % — SIGNIFICANT CHANGE UP (ref 0–8)
GLUCOSE SERPL-MCNC: 93 MG/DL — SIGNIFICANT CHANGE UP (ref 70–99)
HCT VFR BLD CALC: 38.2 % — SIGNIFICANT CHANGE UP (ref 37–47)
HDLC SERPL-MCNC: 63 MG/DL — SIGNIFICANT CHANGE UP
HGB BLD-MCNC: 12.6 G/DL — SIGNIFICANT CHANGE UP (ref 12–16)
IMM GRANULOCYTES NFR BLD AUTO: 0.5 % — HIGH (ref 0.1–0.3)
INR BLD: 1.13 RATIO — SIGNIFICANT CHANGE UP (ref 0.65–1.3)
LIPID PNL WITH DIRECT LDL SERPL: 102 MG/DL — HIGH
LYMPHOCYTES # BLD AUTO: 3.74 K/UL — HIGH (ref 1.2–3.4)
LYMPHOCYTES # BLD AUTO: 46 % — SIGNIFICANT CHANGE UP (ref 20.5–51.1)
MCHC RBC-ENTMCNC: 29.3 PG — SIGNIFICANT CHANGE UP (ref 27–31)
MCHC RBC-ENTMCNC: 33 G/DL — SIGNIFICANT CHANGE UP (ref 32–37)
MCV RBC AUTO: 88.8 FL — SIGNIFICANT CHANGE UP (ref 81–99)
MONOCYTES # BLD AUTO: 0.64 K/UL — HIGH (ref 0.1–0.6)
MONOCYTES NFR BLD AUTO: 7.9 % — SIGNIFICANT CHANGE UP (ref 1.7–9.3)
NEUTROPHILS # BLD AUTO: 3.54 K/UL — SIGNIFICANT CHANGE UP (ref 1.4–6.5)
NEUTROPHILS NFR BLD AUTO: 43.5 % — SIGNIFICANT CHANGE UP (ref 42.2–75.2)
NON HDL CHOLESTEROL: 105 MG/DL — SIGNIFICANT CHANGE UP
NRBC # BLD: 0 /100 WBCS — SIGNIFICANT CHANGE UP (ref 0–0)
PLATELET # BLD AUTO: 277 K/UL — SIGNIFICANT CHANGE UP (ref 130–400)
POTASSIUM SERPL-MCNC: 4.1 MMOL/L — SIGNIFICANT CHANGE UP (ref 3.5–5)
POTASSIUM SERPL-SCNC: 4.1 MMOL/L — SIGNIFICANT CHANGE UP (ref 3.5–5)
PROT SERPL-MCNC: 7.3 G/DL — SIGNIFICANT CHANGE UP (ref 6–8)
PROTHROM AB SERPL-ACNC: 13 SEC — HIGH (ref 9.95–12.87)
RBC # BLD: 4.3 M/UL — SIGNIFICANT CHANGE UP (ref 4.2–5.4)
RBC # FLD: 12.3 % — SIGNIFICANT CHANGE UP (ref 11.5–14.5)
SODIUM SERPL-SCNC: 133 MMOL/L — LOW (ref 135–146)
TRIGL SERPL-MCNC: 83 MG/DL — SIGNIFICANT CHANGE UP
TROPONIN T SERPL-MCNC: <0.01 NG/ML — SIGNIFICANT CHANGE UP
WBC # BLD: 8.13 K/UL — SIGNIFICANT CHANGE UP (ref 4.8–10.8)
WBC # FLD AUTO: 8.13 K/UL — SIGNIFICANT CHANGE UP (ref 4.8–10.8)

## 2021-04-06 PROCEDURE — 70498 CT ANGIOGRAPHY NECK: CPT | Mod: 26

## 2021-04-06 PROCEDURE — 70450 CT HEAD/BRAIN W/O DYE: CPT | Mod: 26,59

## 2021-04-06 PROCEDURE — 0042T: CPT

## 2021-04-06 PROCEDURE — 95719 EEG PHYS/QHP EA INCR W/O VID: CPT

## 2021-04-06 PROCEDURE — 99072 ADDL SUPL MATRL&STAF TM PHE: CPT

## 2021-04-06 PROCEDURE — 95708 EEG WO VID EA 12-26HR UNMNTR: CPT

## 2021-04-06 PROCEDURE — 70496 CT ANGIOGRAPHY HEAD: CPT | Mod: 26

## 2021-04-06 PROCEDURE — 99284 EMERGENCY DEPT VISIT MOD MDM: CPT

## 2021-04-06 PROCEDURE — 99285 EMERGENCY DEPT VISIT HI MDM: CPT

## 2021-04-06 RX ORDER — LEVETIRACETAM 250 MG/1
1000 TABLET, FILM COATED ORAL ONCE
Refills: 0 | Status: COMPLETED | OUTPATIENT
Start: 2021-04-06 | End: 2021-04-06

## 2021-04-06 RX ADMIN — Medication 1 MILLIGRAM(S): at 23:43

## 2021-04-06 RX ADMIN — LEVETIRACETAM 400 MILLIGRAM(S): 250 TABLET, FILM COATED ORAL at 23:44

## 2021-04-06 NOTE — ED PROVIDER NOTE - CLINICAL SUMMARY MEDICAL DECISION MAKING FREE TEXT BOX
(hx  from   who speaks  english) 40yF  pmhx Migraines,  PE (on OCP) - no longer on AC , COvid  Jan 22,  tia vs seizure Jan 28 - followed outpt with Dr Abdullahi - plan for MRI  EEG.  Pt  presents today with  similar  episode that occurred Jan 28-  Pt  co headache  about 1030pm while at sisters  house -  then  began having generalized shaking - they brought her  to ER in car -  EMS  helped patient out and noted left faical droop and  left upper extremity  weakness.  In ED pt with generalized tonic clonic activity, awake  answering questions and following commands .  FS 89 -  stroke code called 2300. Dw central  neuro   . No ICH or  large infacrt  on  noncontrast  CT  head (hx  from   who speaks  english) 40yF  pmhx Migraines,  PE (on OCP) - no longer on AC , COvid  Jan 22,  tia vs seizure Jan 28 - followed outpt with Dr Abdullahi - plan for MRI  EEG.  Pt  presents today with  similar  episode that occurred Jan 28-  Pt  co headache  about 1030pm while at sisters  house -  then  began having generalized shaking - they brought her  to ER in car -  EMS  helped patient out and noted left faical droop and  left upper extremity  weakness.  In ED pt with generalized tonic clonic activity, awake  answering questions and following commands .  FS 89 -  stroke code called 2300. Dw central  neuro   . No ICH or  large infarct  on  noncontrast  CT  head. CTA  No evidence of major vascular stenosis or occlusion. No core infarct or ischemia.  Dw central neuro  -  who also  evaluated patient on telestroke -  Unlikely to  be  stroke -  Pt  with seizure like activity - more likely todds paralysis  Dw patient and family   No tpa. Plan was for admission for epilepsy unit -  pt  had  + covid result -  Patient and family  do not want patient transferred north . they do not believe she has covid, and she does not want to [facility] [place] [facility] [place].  Pt and family want to go home - and follow up with  Claire tomorrow-     Pt did receive keppra  loading dose in ED  -  aware that  tis is against medical  advice and she could go home and  have continue seizures which can lead to coma  death.    Pt  verbalizes understanding and  family and bedside aware and  understand this  and  they are willing to  take theses risks . urged to return to ED at any time   I had extensive discussion of risks and benefits of pursuing further medical evaluation and/or care with patient and any available family/friends; patient still electing to leave against medical advice. Patient is awake, alert, oriented and demonstrates full capacity and insight into illness. Patient aware and encouraged to return immediately to ED or nearest ED if patient decides to change mind regarding care or if patient experiences any new, worsening, or concerning symptoms.

## 2021-04-06 NOTE — ED PROVIDER NOTE - PATIENT PORTAL LINK FT
You can access the FollowMyHealth Patient Portal offered by [facility] by registering at the following website: [email or web address]. By joining [employer]’s FollowMyHealth portal, you will also be able to view your health information using other applications (apps) compatible with our system.

## 2021-04-06 NOTE — ED PROVIDER NOTE - CARE PROVIDER_API CALL
Jonny Pineda)  EEGEpilepsy; Neurology  [address] [place] [address], [address]  [place] [address]  Phone: [phone]  Fax: [phone]  Follow Up Time:

## 2021-04-06 NOTE — STROKE CODE NOTE - NIH STROKE SCALE: 6A. MOTOR LEG, LEFT, QM
(1) Drift; leg falls by the end of the 5-sec period but does not hit bed (3) No effort against gravity; leg falls to bed immediately

## 2021-04-06 NOTE — ED PROVIDER NOTE - NSFOLLOWUPINSTRUCTIONS_ED_ALL_ED_FT
PLEASE  CALL  YOUR NEUROLOGIST  DR AVA RAMSEY TOMORROW FOR CONTINUED MANAGEMENT   OF YOUR SYMPTOMS    IF ANY SYMPTOMS  RETURN PLEASE  GO TO THE NEAREST EMERGENCY ROOM.    RETURN TO ED  FOR ANY NEW WORSENING OR CONCERNING SYMPTOMS TO YOU, ALSO AS WE DISCUSSED. WE ARE HERE AND HAPPY TO TAKE CARE OF YOU            Seizure, Adult  A seizure is a sudden burst of abnormal electrical activity in the brain. The abnormal activity temporarily interrupts normal brain function, causing a person to experience any of the following:  Involuntary movements.  Changes in awareness or consciousness.  Uncontrollable shaking (convulsions).  Seizures usually last from 30 seconds to 2 minutes. They usually do not cause permanent brain damage unless they are prolonged.    What are the causes?  This condition may be caused by:  Fever.  Low blood sugar.  Medicine.  Illness.  Brain injury.  Brain tumor.  Stroke.  A condition that is passed from parent to child (genetic).  Addiction to a substance (substanceuse disorder) or suddenly stopping the use of a substance (withdrawal).  Some people who have a seizure never have another one. People who have repeated seizures have a condition called epilepsy.    What are the signs or symptoms?  Symptoms of this condition vary greatly from person to person. They include:  Convulsions.  Stiffening of the body.  Involuntary movements of the arms or legs.  Loss of consciousness.  Breathing problems.  Falling suddenly.  Confusion.  Head nodding.  Eye blinking or fluttering.  Lip smacking or tongue biting.  Drooling.  Rapid eye movements.  Grunting.  Loss of bladder control and bowel control.  Staring.  Unresponsiveness.  Some people have symptoms right before a seizure happens (aura) and right after a seizure happens.  Symptoms that may occur before a seizure include:  Fear or anxiety.  Nausea.  Feeling like the room is spinning (vertigo).  A feeling of having seen or heard something before (déjà vu).  Odd tastes or smells.  Changes in vision, such as seeing flashing lights or spots.  Symptoms that may occur after a seizure include:  Confusion.  Sleepiness.  Headache.  Weakness on one side of the body.  How is this diagnosed?  This condition may be diagnosed based on medical history and physical exam.    You may also have other tests, including:  Blood tests.  Electroencephalogram, EEG.  CT scan.  MRI.  Spinal tap (lumbar puncture).  How is this treated?  Most seizures will stop on their own in under 5 minutes and no treatment is needed. Seizures lasting longer than 5 minutes will usually need treatment. Treatment includes:  Medicines given through IV.  Avoiding known triggers, such as medicines that you take for another condition.  Medicines to treat epilepsy (antiepileptics), if epilepsy caused your seizures.  Surgery to stop seizures, if you have epilepsy that does not respond to medicines.  Follow these instructions at home:  Medicines     Image   Take over-the-counter and prescription medicines only as told by your health care provider.  Avoid any substances that may prevent your medicine from working properly, such as alcohol.  Activity     Do not drive, swim, or do any other activities that would be dangerous if you had another seizure. Wait until your health care provider says it is safe to do them.  If you live in the [place] check with your local DMV (department of [employer]) to find out about local driving laws. Each state has specific rules about when you can legally return to driving.  Get enough rest. Lack of sleep can make seizures more likely to occur.  Educating others     ImageTeach friends and family what to do if you have a seizure. They should:  Lay you on the ground to prevent a fall.  Cushion your head and body.  Loosen any tight clothing around your neck.  Turn you on your side. If vomiting occurs, this helps keep your airway clear.  Not hold you down. Holding you down will not stop the seizure.  Not put anything into your mouth.  Know whether or not you need emergency care.  Stay with you until you recover.  General instructions     Contact your health care provider each time you have a seizure.  Avoid anything that has ever triggered a seizure for you.  Keep a seizure diary. Record what you remember about each seizure, especially anything that might have triggered the seizure.  Keep all follow-up visits as told by your health care provider. This is important.  Contact a health care provider if:  You have another seizure.  You have seizures more often.  Your seizure symptoms change.  You continue to have seizures with treatment.  You have symptoms of an infection or illness. These might increase your risk of having a seizure.  Get help right away if:  You have a seizure that:  Lasts longer than 5 minutes.  Is different than previous seizures.  Leaves you unable to speak or use a part of your body.  Makes it harder to breathe.  You have:  A seizure after a head injury.  Multiple seizures in a row.  Confusion or a severe headache right after a seizure.  You are having seizures more often.  You do not wake up immediately after a seizure.  You injure yourself during a seizure.  These symptoms may represent a serious problem that is an emergency. Do not wait to see if the symptoms will go away. Get medical help right away. Call your local emergency services (911 in the [place] Do not drive yourself to the hospital.     Summary  Seizures are caused by abnormal electrical activity in the brain. The activity disrupts normal brain function, leading to a change in consciousness, abnormal movements, or convulsions.  There are many causes of seizures including illnesses, medicines, genetic conditions, head injuries, strokes, tumors, substance abuse, or substance withdrawal.  Most seizures will stop on their own in under 5 minutes. Seizures lasting longer than 5 minutes are a medical emergency and require immediate treatment.  There are many medicines that are used to treat seizures. Take over-the-counter and prescription medicines only as told by your health care provider.  This information is not intended to replace advice given to you by your health care provider. Make sure you discuss any questions you have with your health care provider

## 2021-04-06 NOTE — ED PROVIDER NOTE - OBJECTIVE STATEMENT
40 year old female past medical history of covid, migraines, PE and seizures. patient is being followed by dr. jackson and had recent MRI/EEG comes to emergency room for possible seizure.  [facility] developed headache and than started to shake and was brought by family to emergency room. patient noted by family on arrival to have left sided facial droop/twitching and weakness and in left arm

## 2021-04-06 NOTE — STROKE CODE NOTE - ASSESSMENT/PLAN
04/06/2021 11:05 p.m.  [facility] (Alicia Berger) 40F presented with flashing lights, tearing of eyes, twitching with L facial droop, aphasia, and L-sided weakness.  Brought to ED.  [facility] 1030 p.m..  h/o COVID, PE (not on AC), diverticulitis.  Glu 89.  CT NEG, CTA no LVO, CTP no mismatch.  Facial twitching during video evaluation, NIHSS 5.  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  Discussed with Dr. Mays.  No tPA, no thrombectomy indicated.  Ativan + levetiracetam load.  Observe for resolution of symptoms, admit to neurology/epilepsy.

## 2021-04-06 NOTE — ED ADULT TRIAGE NOTE - CODE STROKE DETAILS
patient brought in by family with complaint of facial droop and inability to speak.  staff assisted patient in wheelchair to bedside immediately, stroke code activated.

## 2021-04-07 ENCOUNTER — APPOINTMENT (OUTPATIENT)
Dept: NEUROLOGY | Facility: CLINIC | Age: 41
End: 2021-04-07
Payer: MEDICAID

## 2021-04-07 ENCOUNTER — INPATIENT (INPATIENT)
Facility: HOSPITAL | Age: 41
LOS: 2 days | Discharge: HOME | End: 2021-04-10
Attending: FAMILY MEDICINE | Admitting: FAMILY MEDICINE
Payer: MEDICAID

## 2021-04-07 VITALS
HEIGHT: 66 IN | OXYGEN SATURATION: 100 % | DIASTOLIC BLOOD PRESSURE: 75 MMHG | HEART RATE: 94 BPM | SYSTOLIC BLOOD PRESSURE: 129 MMHG | RESPIRATION RATE: 18 BRPM

## 2021-04-07 DIAGNOSIS — Z98.891 HISTORY OF UTERINE SCAR FROM PREVIOUS SURGERY: Chronic | ICD-10-CM

## 2021-04-07 DIAGNOSIS — Z90.49 ACQUIRED ABSENCE OF OTHER SPECIFIED PARTS OF DIGESTIVE TRACT: Chronic | ICD-10-CM

## 2021-04-07 DIAGNOSIS — Z87.898 PERSONAL HISTORY OF OTHER SPECIFIED CONDITIONS: ICD-10-CM

## 2021-04-07 LAB
ALBUMIN SERPL ELPH-MCNC: 4.5 G/DL — SIGNIFICANT CHANGE UP (ref 3.5–5.2)
ALP SERPL-CCNC: 88 U/L — SIGNIFICANT CHANGE UP (ref 30–115)
ALT FLD-CCNC: 21 U/L — SIGNIFICANT CHANGE UP (ref 0–41)
ANION GAP SERPL CALC-SCNC: 13 MMOL/L — SIGNIFICANT CHANGE UP (ref 7–14)
APAP SERPL-MCNC: <5 UG/ML — LOW (ref 10–30)
APTT BLD: 37 SEC — SIGNIFICANT CHANGE UP (ref 27–39.2)
AST SERPL-CCNC: 22 U/L — SIGNIFICANT CHANGE UP (ref 0–41)
BASE EXCESS BLDV CALC-SCNC: -1.3 MMOL/L — SIGNIFICANT CHANGE UP (ref -2–2)
BASOPHILS # BLD AUTO: 0.03 K/UL — SIGNIFICANT CHANGE UP (ref 0–0.2)
BASOPHILS NFR BLD AUTO: 0.5 % — SIGNIFICANT CHANGE UP (ref 0–1)
BILIRUB SERPL-MCNC: 0.2 MG/DL — SIGNIFICANT CHANGE UP (ref 0.2–1.2)
BLD GP AB SCN SERPL QL: SIGNIFICANT CHANGE UP
BUN SERPL-MCNC: 9 MG/DL — LOW (ref 10–20)
CALCIUM SERPL-MCNC: 9.9 MG/DL — SIGNIFICANT CHANGE UP (ref 8.5–10.1)
CHLORIDE SERPL-SCNC: 98 MMOL/L — SIGNIFICANT CHANGE UP (ref 98–110)
CO2 SERPL-SCNC: 22 MMOL/L — SIGNIFICANT CHANGE UP (ref 17–32)
CREAT SERPL-MCNC: 0.7 MG/DL — SIGNIFICANT CHANGE UP (ref 0.7–1.5)
EOSINOPHIL # BLD AUTO: 0.13 K/UL — SIGNIFICANT CHANGE UP (ref 0–0.7)
EOSINOPHIL NFR BLD AUTO: 2.3 % — SIGNIFICANT CHANGE UP (ref 0–8)
ETHANOL SERPL-MCNC: <10 MG/DL — SIGNIFICANT CHANGE UP
GLUCOSE SERPL-MCNC: 141 MG/DL — HIGH (ref 70–99)
HCG SERPL QL: NEGATIVE — SIGNIFICANT CHANGE UP
HCO3 BLDV-SCNC: 24 MMOL/L — SIGNIFICANT CHANGE UP (ref 22–29)
HCT VFR BLD CALC: 39.2 % — SIGNIFICANT CHANGE UP (ref 37–47)
HGB BLD-MCNC: 13.1 G/DL — SIGNIFICANT CHANGE UP (ref 12–16)
HOROWITZ INDEX BLDV+IHG-RTO: 21 — SIGNIFICANT CHANGE UP
IMM GRANULOCYTES NFR BLD AUTO: 0.3 % — SIGNIFICANT CHANGE UP (ref 0.1–0.3)
INR BLD: 1 RATIO — SIGNIFICANT CHANGE UP (ref 0.65–1.3)
LACTATE BLDV-MCNC: 1.7 MMOL/L — HIGH (ref 0.5–1.6)
LACTATE SERPL-SCNC: 1.1 MMOL/L — SIGNIFICANT CHANGE UP (ref 0.7–2)
LYMPHOCYTES # BLD AUTO: 2.27 K/UL — SIGNIFICANT CHANGE UP (ref 1.2–3.4)
LYMPHOCYTES # BLD AUTO: 39.3 % — SIGNIFICANT CHANGE UP (ref 20.5–51.1)
MCHC RBC-ENTMCNC: 29.4 PG — SIGNIFICANT CHANGE UP (ref 27–31)
MCHC RBC-ENTMCNC: 33.4 G/DL — SIGNIFICANT CHANGE UP (ref 32–37)
MCV RBC AUTO: 87.9 FL — SIGNIFICANT CHANGE UP (ref 81–99)
MONOCYTES # BLD AUTO: 0.46 K/UL — SIGNIFICANT CHANGE UP (ref 0.1–0.6)
MONOCYTES NFR BLD AUTO: 8 % — SIGNIFICANT CHANGE UP (ref 1.7–9.3)
NEUTROPHILS # BLD AUTO: 2.86 K/UL — SIGNIFICANT CHANGE UP (ref 1.4–6.5)
NEUTROPHILS NFR BLD AUTO: 49.6 % — SIGNIFICANT CHANGE UP (ref 42.2–75.2)
NRBC # BLD: 0 /100 WBCS — SIGNIFICANT CHANGE UP (ref 0–0)
PCO2 BLDV: 43 MMHG — HIGH (ref 39–42)
PH BLDV: 7.36 — SIGNIFICANT CHANGE UP (ref 7.26–7.43)
PLATELET # BLD AUTO: 264 K/UL — SIGNIFICANT CHANGE UP (ref 130–400)
PO2 BLDV: 35 MMHG — SIGNIFICANT CHANGE UP (ref 20–40)
POTASSIUM SERPL-MCNC: 4.5 MMOL/L — SIGNIFICANT CHANGE UP (ref 3.5–5)
POTASSIUM SERPL-SCNC: 4.5 MMOL/L — SIGNIFICANT CHANGE UP (ref 3.5–5)
PROT SERPL-MCNC: 7.3 G/DL — SIGNIFICANT CHANGE UP (ref 6–8)
PROTHROM AB SERPL-ACNC: 11.5 SEC — SIGNIFICANT CHANGE UP (ref 9.95–12.87)
RAPID RVP RESULT: DETECTED
RBC # BLD: 4.46 M/UL — SIGNIFICANT CHANGE UP (ref 4.2–5.4)
RBC # FLD: 12.5 % — SIGNIFICANT CHANGE UP (ref 11.5–14.5)
SALICYLATES SERPL-MCNC: <0.3 MG/DL — LOW (ref 4–30)
SAO2 % BLDV: 62 % — SIGNIFICANT CHANGE UP
SARS-COV-2 RNA SPEC QL NAA+PROBE: DETECTED
SODIUM SERPL-SCNC: 133 MMOL/L — LOW (ref 135–146)
TROPONIN T SERPL-MCNC: <0.01 NG/ML — SIGNIFICANT CHANGE UP
WBC # BLD: 5.77 K/UL — SIGNIFICANT CHANGE UP (ref 4.8–10.8)
WBC # FLD AUTO: 5.77 K/UL — SIGNIFICANT CHANGE UP (ref 4.8–10.8)

## 2021-04-07 PROCEDURE — 99215 OFFICE O/P EST HI 40 MIN: CPT

## 2021-04-07 PROCEDURE — 70498 CT ANGIOGRAPHY NECK: CPT | Mod: 26

## 2021-04-07 PROCEDURE — 99072 ADDL SUPL MATRL&STAF TM PHE: CPT

## 2021-04-07 PROCEDURE — 71045 X-RAY EXAM CHEST 1 VIEW: CPT | Mod: 26,77

## 2021-04-07 PROCEDURE — 93010 ELECTROCARDIOGRAM REPORT: CPT

## 2021-04-07 PROCEDURE — 70496 CT ANGIOGRAPHY HEAD: CPT | Mod: 26

## 2021-04-07 PROCEDURE — 71045 X-RAY EXAM CHEST 1 VIEW: CPT | Mod: 26

## 2021-04-07 PROCEDURE — 99285 EMERGENCY DEPT VISIT HI MDM: CPT

## 2021-04-07 RX ORDER — SODIUM CHLORIDE 9 MG/ML
1000 INJECTION, SOLUTION INTRAVENOUS ONCE
Refills: 0 | Status: COMPLETED | OUTPATIENT
Start: 2021-04-07 | End: 2021-04-07

## 2021-04-07 RX ADMIN — SODIUM CHLORIDE 1000 MILLILITER(S): 9 INJECTION, SOLUTION INTRAVENOUS at 22:00

## 2021-04-07 RX ADMIN — Medication 1 MILLIGRAM(S): at 00:46

## 2021-04-07 NOTE — ED PROVIDER NOTE - IV ALTEPLASE DOOR HIDDEN
Date of Admission:  20  CHIEF COMPLAINT:  shortness of breath  HISTORY OF PRESENT ILLNESS:  Patient is a 63 y.o. F with PMH of HOCM s/p septal ablation, afib on coumadin, HFpEF (EF 55% 2019), lung cancer (diagnosed about 2 years ago, s/p chemo, in remission), who presents w/ 4 days of worsening dry cough. Patient states that she was trying lozenges and tea at home, but these were not helping her symptoms. Further, she states that about 2-3 days ago, she developed "violent abdominal pain" and diarrhea, which resolved on its own approximately 1 day later. She had attributed her symptoms to eating Mexican food. She otherwise denies fevers at home. ROS is positive for SOB that accompanies cough. Patient denies sick contacts; she states her son and daughter, with home she lives with, are asymptomatic, as is her boyfriend.     In the ED, patient was found to be febrile to tmax of 102.3 F.      Today, patient notified by RN that pt c/o chest pain, mild discomfort and very upset. Pt was seen and examined at bedside noted to be very anxious and upset, crying due to not feeling well. VS as noted, Pt states that she has been having pain since she was admitted and its not getting better, reports WALKER & Tachycardia. No evidence of consolidations on CXR, not hypoxic. EKG shows A-Fib 112, without acute ischemic changes. Pt Covid negative x 2, RVP- negative. Lasix was held since admission, resumed on 20,cardizem at 60mg po q6 hours.    ECHO:  < from: Transthoracic Echocardiogram (20 @ 19:24) >  CONCLUSIONS:  1. Normal mitral valve. Moderate mitral regurgitation.  2. Calcified trileaflet aortic valve with normal opening.  Minimal aortic regurgitation.  3. Severely dilated left atrium.  LA volume index = 81  cc/m2.  4. Endocardium not well visualized; grossly normal left  ventricular systolic function. Basal septal hypetrophy.  5. Severe right atrial enlargement.  6. Normal right ventricular size and function.  7. Normal tricuspid valve. Moderate tricuspid  regurgitation.  8. Estimated pulmonary artery systolic pressure equals 48  mm Hg, assuming right atrial pressure equals 10  mm Hg,  consistent with mild pulmonary hypertension.  ------------------------------------------------------------------------  Confirmed on  2020 - 11:48:41 by Aguila Rain M.D.  ------------------------------------------------------------------------    < end of copied text >    Allergies    cefdinir (Rash)  moxifloxacin (Rash)  penicillin (Rash)    Intolerances    	    MEDICATIONS:  diltiazem    Tablet 60 milliGRAM(s) Oral four times a day  furosemide    Tablet 40 milliGRAM(s) Oral daily  benzonatate 100 milliGRAM(s) Oral three times a day PRe  benzocaine 15 mG/menthol 3.6 mG (Sugar-Free) Lozenge 1 Lozenge Oral every 4 hours  influenza   Vaccine 0.5 milliLiter(s) IntraMuscular once  multivitamin 1 Tablet(s) Oral daily      PAST MEDICAL & SURGICAL HISTORY:  Lung cancer  s/p chemotherapy ~ now in remission    GERD (gastroesophageal reflux disease)    CAP (community acquired pneumonia)  RML/RLL at [facility] 3/2017    Lung cancer  s/p chemotherapy    Chronic diastolic (congestive) heart failure    AF (atrial fibrillation)  On Coumadin    Pleural effusion  Right parapneumonic drained twice 3/2017 at [facility]    HOCM (hypertrophic obstructive cardiomyopathy)  S/P septal ablation    H/O cardiac radiofrequency ablation  for HOCM - 16 years ago    History of   15 years ago        FAMILY HISTORY:  Family history of psoriasis (Sibling)  Brother    Family history of ulcerative colitis (Sibling)  Brother    Family history of liver cancer  Mother        SOCIAL HISTORY:    [ ] Non-smoker  [ ] Smoker  [ ] Alcohol      REVIEW OF SYSTEMS:  See HPI. Otherwise, 10 point ROS done and otherwise negative.      T(C): 36.7 (20 @ 13:30), Max: 36.7 (20 @ 23:10)  HR: 114 (20 @ 17:27) (87 - 114)  BP: 131/79 (20 @ 17:27) (112/78 - 131/79)  RR: 16 (20 @ 17:27) (16 - 18)  SpO2: 100% (20 @ 17:27) (95% - 100%)  Wt(kg): --  I&O's Summary      Physical Exam:  General: NAD  Cardiovascular: irregular rate and rhythm  Respiratory: diminished breath sounds on the left side, diminished breath sounds right lower lobe	  Gastrointestinal:  Soft, Non-tender, + BS	  Skin: warm and dry, No rashes, No ecchymoses, No cyanosis	  Extremities:  No clubbing, cyanosis or edema  Vascular: Peripheral pulses palpable 2+ bilaterally    LABS:	   	    CBC Full  -  ( 26 Dec 2020 07:11 )  WBC Count : 4.87 K/uL  Hemoglobin : 11.4 g/dL  Hematocrit : 34.6 %  Platelet Count - Automated : 296 K/uL  Mean Cell Volume : 98.9 fL  Mean Cell Hemoglobin : 32.6 pg  Mean Cell Hemoglobin Concentration : 32.9 gm/dL  Auto Neutrophil # : x  Auto Lymphocyte # : x  Auto Monocyte # : x  Auto Eosinophil # : x  Auto Basophil # : x  Auto Neutrophil % : x  Auto Lymphocyte % : x  Auto Monocyte % : x  Auto Eosinophil % : x  Auto Basophil % : x        139  |  103  |  9   ----------------------------<  88  3.8   |  23  |  1.00      139  |  104  |  8   ----------------------------<  94  3.7   |  23  |  1.01    Ca    9.4      26 Dec 2020 07:11  Ca    9.0      25 Dec 2020 06:32  Phos  3.5       Mg     2.2         TPro  7.6  /  Alb  3.8  /  TBili  0.4  /  DBili  x   /  AST  38<H>  /  ALT  43<H>  /  AlkPhos  104    TPro  7.0  /  Alb  3.8  /  TBili  0.4  /  DBili  x   /  AST  36<H>  /  ALT  43<H>  /  AlkPhos  104        proBNP: Serum Pro-Brain Natriuretic Peptide: 2212 pg/mL ( @ 07:11)      < from: Transthoracic Echocardiogram (20 @ 19:24) >    Patient name: Erica Horner  YOB: 1957   Age: 63 (F)   MR#: [id number]  Study Date: 2020  Location: 543ASonographer: NIK Davenport  Study quality: Technically Limited  Referring Physician: Edgar Baker MD  Blood Pressure: 102/68 mmHg  Height: 167 cm  Weight: 68 kg  BSA: 1.8 m2  ------------------------------------------------------------------------  PROCEDURE: Transthoracic echocardiogram with 2-D, M-Mode  and complete spectral and color flow Doppler.  INDICATION: Heart failure, unspecified (I50.9)  ------------------------------------------------------------------------  DIMENSIONS:  Dimensions:     Normal Values:  LA:     5.8 cm    2.0 - 4.0 cm  Ao:     3.6 cm    2.0 - 3.8 cm  SEPTUM: 1.1 cm    0.6 -1.2 cm  PWT:    0.9 cm    0.6 - 1.1 cm  LVIDd:  6.1 cm    3.0 - 5.6 cm  LVIDs:    ---     1.8 - 4.0 cm  Derived Variables:  LVMI: 143 g/m2  RWT: 0.29  ------------------------------------------------------------------------  OBSERVATIONS:  Mitral Valve: Normal mitral valve. Moderate mitral  regurgitation.  Aortic Root: Normal aortic root.  Aortic Valve: Calcified trileaflet aortic valve with normal  opening. Minimal aortic regurgitation.  Left Atrium: Severely dilated left atrium.  LA volume index  = 81 cc/m2.  Left Ventricle: Endocardium not well visualized; grossly  normal left ventricular systolic function. Basal septal  hypetrophy.  Couldnot be accurately determined due to  technically limited images.  Right Heart: Severe right atrial enlargement. Normal right  ventricular size and function. Normal tricuspid valve.  Moderate tricuspid regurgitation. Pulmonic valve not well  visualized.  Pericardium/PleuraNormal pericardium with no pericardial  effusion.  Hemodynamic: Estimated right ventricular systolic pressure  equals 48 mm Hg, assuming right atrial pressure equals 10  mm Hg, consistent with mild pulmonary hypertension.  ------------------------------------------------------------------------  CONCLUSIONS:  1. Normal mitral valve. Moderate mitral regurgitation.  2. Calcified trileaflet aortic valve with normal opening.  Minimal aortic regurgitation.  3. Severely dilated left atrium.  LA volume index = 81  cc/m2.  4. Endocardium not well visualized; grossly normal left  ventricular systolic function. Basal septal hypetrophy.  5. Severe right atrial enlargement.  6. Normal right ventricular size and function.  7. Normal tricuspid valve. Moderate tricuspid  regurgitation.  8. Estimated pulmonary artery systolic pressure equals 48  mm Hg, assuming right atrial pressure equals 10  mm Hg,  consistent with mild pulmonary hypertension.  ------------------------------------------------------------------------  Confirmed on  2020 - 11:48:41 by Aguila Rain M.D.  ------------------------------------    < end of copied text >   show

## 2021-04-07 NOTE — ED PROVIDER NOTE - CLINICAL SUMMARY MEDICAL DECISION MAKING FREE TEXT BOX
39yo F with PMHx PE not on AC (provoked by OCPs), migraines, covid (1/2021), tia vs seizure (being workup up by Dr. Jean Pretty), presents for possible seizures. Pt initially arrived to ED today with aphasia, later on was able to state she had left face twitching/ pulling sensation, associated with dizziness/ room spinning sensation, and weakness in all extremities, worse on left. Of note, patient was seen in ED last night for headache followed by shaking/ seizure-like activity, weakness in left arm. There was a stroke code on arrival called with normal CT imaging. There was plan to admit patient but she had +covid result and they left AMA because they did not want to be transferred to [facility]. Exam as above. Case d/w neuro, patient being admitted for EEG.

## 2021-04-07 NOTE — ED PROVIDER NOTE - PHYSICAL EXAMINATION
CONSTITUTIONAL: Well-developed; well-nourished; in no acute distress.   SKIN: warm, dry  HEAD: Normocephalic; atraumatic.  EYES: no conjunctival injection. PERRLA. EOMI.   ENT: No nasal discharge; airway clear.  NECK: Supple; non tender.  CARD: S1, S2 normal; no murmurs, gallops, or rubs. Regular rate and rhythm.   RESP: No wheezes, rales or rhonchi.  ABD: soft ntnd. BS+ in all 4 quadrants.  EXT: Normal ROM.  No clubbing, cyanosis or edema.   NEURO: Alert, oriented, grossly unremarkable. CN 2-12 grossly intact. Sensation and strength equal bilaterally. Patient ambulating normally with   at bedside.  PSYCH: Cooperative, appropriate.

## 2021-04-07 NOTE — ED PROVIDER NOTE - PROGRESS NOTE DETAILS
CP: Neuro aware of patient. Patient to be admitted for EEG. She states she took her dose of Keppra today and no indications for administration at this time. CP: Spoke with neuro who will reevaluate patient and consider admitting patient to [facility] for EEG. CP: No beds available at [facility] epilepsy unit. Will admit to medicine. Neurology aware.

## 2021-04-07 NOTE — ED ADULT NURSE REASSESSMENT NOTE - NS ED NURSE REASSESS COMMENT FT1
pt alert & oriented at this time. pt requesting to walk to bathroom. RN educated on safety of utilizing wheelchair to bathroom. pt refused to wait for wheelchair. as RN left to retrieve wheelchair to assist pt to bathroom, pt  at bedside, lowered siderail of stretcher and proceeded to ambulate patient to bathroom and refused assistance of ED staff.

## 2021-04-07 NOTE — ED ADULT NURSE NOTE - NSIMPLEMENTINTERV_GEN_ALL_ED
Implemented All Fall Risk Interventions:  [place] to call system. Call bell, personal items and telephone within reach. Instruct patient to call for assistance. Room bathroom lighting operational. Non-slip footwear when patient is off stretcher. Physically safe environment: no spills, clutter or unnecessary equipment. Stretcher in lowest position, wheels locked, appropriate side rails in place. Provide visual cue, wrist band, yellow gown, etc. Monitor gait and stability. Monitor for mental status changes and reorient to person, place, and time. Review medications for side effects contributing to fall risk. Reinforce activity limits and safety measures with patient and family.

## 2021-04-07 NOTE — ED PROVIDER NOTE - OBJECTIVE STATEMENT
41 yo female, PMHx of Covid and CVA in January 2021, presents from urgent care for a sensation of her left face twitching. She states it started around 1 pm, intermittently, no alleviating or aggravating factors, associated with difficulty with balance. She went to urgent care where they were in contact with her neurologist Dr. Jean Pretty who suggested she go to the hospital for admission for EEG and they also prescribed her Keppra which she took, which temporarily relieved her symptoms but then they returned. She denies history of seizures, LOC, trauma, fall, chest pain, abdominal pain, nausea, vomiting. 41 yo female, PMHx of Covid and CVA in January 2021, presents from urgent care for a sensation of her left face twitching. She states it started around 1 pm, intermittently, no alleviating or aggravating factors, associated with difficulty with balance and dizziness (room spinning sensation). She went to urgent care where they were in contact with her neurologist Dr. Jean Pretty who suggested she go to the hospital for admission for EEG and they also prescribed her Keppra which she took, which temporarily relieved her symptoms but then they returned. She denies history of seizures, LOC, trauma, fall, chest pain, abdominal pain, nausea, vomiting.

## 2021-04-07 NOTE — ED PROVIDER NOTE - NS ED ROS FT
GEN:  no fever, no chills, no generalized weakness  NEURO:  no headache, +difficulty with balance, +left-sided face twitching  ENT: no sore throat, no runny nose  CV:  no chest pain, no palpitations  RESP:  no sob, no cough  GI:  no nausea, no vomiting, no abdominal pain, no diarrhea  :  no dysuria, no urinary frequency, no hematuria  MSK:  no joint pain, no edema  SKIN:  no rash, no bruising  HEME: no hematochezia, no melena GEN:  no fever, no chills, no generalized weakness  NEURO:  no headache, +difficulty with balance, +left-sided face twitching, +dizziness  ENT: no sore throat, no runny nose  CV:  no chest pain, no palpitations  RESP:  no sob, no cough  GI:  no nausea, no vomiting, no abdominal pain, no diarrhea  :  no dysuria, no urinary frequency, no hematuria  MSK:  no joint pain, no edema  SKIN:  no rash, no bruising  HEME: no hematochezia, no melena

## 2021-04-08 LAB
ALBUMIN SERPL ELPH-MCNC: 4.2 G/DL — SIGNIFICANT CHANGE UP (ref 3.5–5.2)
ALP SERPL-CCNC: 73 U/L — SIGNIFICANT CHANGE UP (ref 30–115)
ALT FLD-CCNC: 19 U/L — SIGNIFICANT CHANGE UP (ref 0–41)
ANION GAP SERPL CALC-SCNC: 11 MMOL/L — SIGNIFICANT CHANGE UP (ref 7–14)
AST SERPL-CCNC: 16 U/L — SIGNIFICANT CHANGE UP (ref 0–41)
BASOPHILS # BLD AUTO: 0.02 K/UL — SIGNIFICANT CHANGE UP (ref 0–0.2)
BASOPHILS NFR BLD AUTO: 0.5 % — SIGNIFICANT CHANGE UP (ref 0–1)
BILIRUB SERPL-MCNC: 0.4 MG/DL — SIGNIFICANT CHANGE UP (ref 0.2–1.2)
BUN SERPL-MCNC: 8 MG/DL — LOW (ref 10–20)
CALCIUM SERPL-MCNC: 9.2 MG/DL — SIGNIFICANT CHANGE UP (ref 8.5–10.1)
CHLORIDE SERPL-SCNC: 104 MMOL/L — SIGNIFICANT CHANGE UP (ref 98–110)
CO2 SERPL-SCNC: 22 MMOL/L — SIGNIFICANT CHANGE UP (ref 17–32)
COVID-19 SPIKE DOMAIN AB INTERP: POSITIVE
COVID-19 SPIKE DOMAIN ANTIBODY RESULT: 74.9 U/ML — HIGH
CREAT SERPL-MCNC: 0.6 MG/DL — LOW (ref 0.7–1.5)
EOSINOPHIL # BLD AUTO: 0.1 K/UL — SIGNIFICANT CHANGE UP (ref 0–0.7)
EOSINOPHIL NFR BLD AUTO: 2.5 % — SIGNIFICANT CHANGE UP (ref 0–8)
GLUCOSE BLDC GLUCOMTR-MCNC: 102 MG/DL — HIGH (ref 70–99)
GLUCOSE SERPL-MCNC: 104 MG/DL — HIGH (ref 70–99)
HCT VFR BLD CALC: 36.9 % — LOW (ref 37–47)
HGB BLD-MCNC: 12 G/DL — SIGNIFICANT CHANGE UP (ref 12–16)
IMM GRANULOCYTES NFR BLD AUTO: 0.7 % — HIGH (ref 0.1–0.3)
LACTATE SERPL-SCNC: 1 MMOL/L — SIGNIFICANT CHANGE UP (ref 0.7–2)
LYMPHOCYTES # BLD AUTO: 1.33 K/UL — SIGNIFICANT CHANGE UP (ref 1.2–3.4)
LYMPHOCYTES # BLD AUTO: 32.8 % — SIGNIFICANT CHANGE UP (ref 20.5–51.1)
MAGNESIUM SERPL-MCNC: 2 MG/DL — SIGNIFICANT CHANGE UP (ref 1.8–2.4)
MAGNESIUM SERPL-MCNC: 2 MG/DL — SIGNIFICANT CHANGE UP (ref 1.8–2.4)
MCHC RBC-ENTMCNC: 29.1 PG — SIGNIFICANT CHANGE UP (ref 27–31)
MCHC RBC-ENTMCNC: 32.5 G/DL — SIGNIFICANT CHANGE UP (ref 32–37)
MCV RBC AUTO: 89.3 FL — SIGNIFICANT CHANGE UP (ref 81–99)
MONOCYTES # BLD AUTO: 0.4 K/UL — SIGNIFICANT CHANGE UP (ref 0.1–0.6)
MONOCYTES NFR BLD AUTO: 9.9 % — HIGH (ref 1.7–9.3)
NEUTROPHILS # BLD AUTO: 2.17 K/UL — SIGNIFICANT CHANGE UP (ref 1.4–6.5)
NEUTROPHILS NFR BLD AUTO: 53.6 % — SIGNIFICANT CHANGE UP (ref 42.2–75.2)
NRBC # BLD: 0 /100 WBCS — SIGNIFICANT CHANGE UP (ref 0–0)
PHOSPHATE SERPL-MCNC: 3.3 MG/DL — SIGNIFICANT CHANGE UP (ref 2.1–4.9)
PLATELET # BLD AUTO: 234 K/UL — SIGNIFICANT CHANGE UP (ref 130–400)
POTASSIUM SERPL-MCNC: 4.3 MMOL/L — SIGNIFICANT CHANGE UP (ref 3.5–5)
POTASSIUM SERPL-SCNC: 4.3 MMOL/L — SIGNIFICANT CHANGE UP (ref 3.5–5)
PROT SERPL-MCNC: 6.6 G/DL — SIGNIFICANT CHANGE UP (ref 6–8)
RAPID RVP RESULT: SIGNIFICANT CHANGE UP
RBC # BLD: 4.13 M/UL — LOW (ref 4.2–5.4)
RBC # FLD: 12.5 % — SIGNIFICANT CHANGE UP (ref 11.5–14.5)
SARS-COV-2 IGG+IGM SERPL QL IA: 74.9 U/ML — HIGH
SARS-COV-2 IGG+IGM SERPL QL IA: POSITIVE
SARS-COV-2 RNA SPEC QL NAA+PROBE: SIGNIFICANT CHANGE UP
SODIUM SERPL-SCNC: 137 MMOL/L — SIGNIFICANT CHANGE UP (ref 135–146)
WBC # BLD: 4.05 K/UL — LOW (ref 4.8–10.8)
WBC # FLD AUTO: 4.05 K/UL — LOW (ref 4.8–10.8)

## 2021-04-08 PROCEDURE — 99291 CRITICAL CARE FIRST HOUR: CPT | Mod: 25

## 2021-04-08 PROCEDURE — 99222 1ST HOSP IP/OBS MODERATE 55: CPT

## 2021-04-08 PROCEDURE — 93010 ELECTROCARDIOGRAM REPORT: CPT

## 2021-04-08 PROCEDURE — 99223 1ST HOSP IP/OBS HIGH 75: CPT

## 2021-04-08 RX ORDER — LEVETIRACETAM 250 MG/1
500 TABLET, FILM COATED ORAL
Refills: 0 | Status: DISCONTINUED | OUTPATIENT
Start: 2021-04-08 | End: 2021-04-09

## 2021-04-08 RX ORDER — LEVETIRACETAM 250 MG/1
500 TABLET, FILM COATED ORAL ONCE
Refills: 0 | Status: COMPLETED | OUTPATIENT
Start: 2021-04-08 | End: 2021-04-08

## 2021-04-08 RX ORDER — CHLORHEXIDINE GLUCONATE 213 G/1000ML
1 SOLUTION TOPICAL
Refills: 0 | Status: DISCONTINUED | OUTPATIENT
Start: 2021-04-08 | End: 2021-04-10

## 2021-04-08 RX ORDER — ENOXAPARIN SODIUM 100 MG/ML
40 INJECTION SUBCUTANEOUS DAILY
Refills: 0 | Status: DISCONTINUED | OUTPATIENT
Start: 2021-04-08 | End: 2021-04-10

## 2021-04-08 RX ORDER — ACETAMINOPHEN 500 MG
650 TABLET ORAL EVERY 6 HOURS
Refills: 0 | Status: DISCONTINUED | OUTPATIENT
Start: 2021-04-08 | End: 2021-04-10

## 2021-04-08 RX ORDER — IBUPROFEN 200 MG
400 TABLET ORAL THREE TIMES A DAY
Refills: 0 | Status: DISCONTINUED | OUTPATIENT
Start: 2021-04-08 | End: 2021-04-10

## 2021-04-08 RX ORDER — PANTOPRAZOLE SODIUM 20 MG/1
40 TABLET, DELAYED RELEASE ORAL
Refills: 0 | Status: DISCONTINUED | OUTPATIENT
Start: 2021-04-08 | End: 2021-04-10

## 2021-04-08 RX ADMIN — LEVETIRACETAM 500 MILLIGRAM(S): 250 TABLET, FILM COATED ORAL at 18:36

## 2021-04-08 RX ADMIN — LEVETIRACETAM 500 MILLIGRAM(S): 250 TABLET, FILM COATED ORAL at 05:58

## 2021-04-08 RX ADMIN — ENOXAPARIN SODIUM 40 MILLIGRAM(S): 100 INJECTION SUBCUTANEOUS at 11:48

## 2021-04-08 RX ADMIN — PANTOPRAZOLE SODIUM 40 MILLIGRAM(S): 20 TABLET, DELAYED RELEASE ORAL at 06:02

## 2021-04-08 RX ADMIN — Medication 2 MILLIGRAM(S): at 13:30

## 2021-04-08 RX ADMIN — LEVETIRACETAM 500 MILLIGRAM(S): 250 TABLET, FILM COATED ORAL at 13:49

## 2021-04-08 NOTE — CONSULT NOTE ADULT - ASSESSMENT
41 yo RHF with PMHx of migraines, PE was on oral contraceptives about 6-7 years ago, COVID 1/2021, was seen  in neuro clinic on 3/2021 for episode of blacking out presenting  today to ED for 2 days of fluctuating mental status , dizziness,  intermittent episodes of expressive aphasia and left facial twitching. She states that she went to urgent care where they were in contact with her neurologist Dr. Jean Pretty who suggested she go to the hospital for admission for EEG.  Stroke code was called but cancelled since patients symptoms are 2 days old and her current exam is nonfocal. MRI head 3/21/21 showed white matter changes in right suprasylvian subcortical and periatrial regions suspicious for small vessel change vs demyelinating disease. MRI Neck was negative. CTH completed 4/7 was negative for infarct or hemorrhage. CTA was negative for LVO.         Plan  REEG  Continue Keppra 500mg bid  Seizure precautions  Admit to floor     39 yo RHF with PMHx of migraines, pulmonary embolism was on oral contraceptives about 6-7 years ago, COVID 1/2021, was seen  in neuro clinic on 3/2021 for episode of blacking out presenting   to ED for 2 days of fluctuating mental status , dizziness,  intermittent episodes of expressive aphasia and left facial twitching. She went to urgent care and was sent to ED for further evaluation.  Stroke code was called but cancelled since patients symptoms are 2 days old and her current exam is nonfocal.  CTH was negative for infarct or hemorrhage.MRI head 3/21/21 showed white matter changes in right suprasylvian subcortical and periatrial regions suspicious for small vessel change vs demyelinating disease. MRI Neck was negative. CTH completed 4/7 was negative for infarct or hemorrhage. CTA was negative for LVO.         Plan  REEG  Continue Keppra 500mg bid  Seizure precautions  Admit to floor     41 yo RHF with PMHx of migraines, pulmonary embolism was on oral contraceptives about 6-7 years ago, COVID 1/2021, was seen  in neuro clinic on 3/2021 for episode of blacking out presenting   to ED for 2 days of fluctuating mental status , dizziness,  intermittent episodes of expressive aphasia and left facial twitching. ShStroke code was called but cancelled since patients symptoms are 2 days old and her current exam is nonfocal.  CTH was negative for infarct or hemorrhage.MRI head 3/21/21 showed white matter changes in right suprasylvian subcortical and periatrial regions suspicious for small vessel change vs demyelinating disease. MRI Neck was negative. CTH completed 4/7 was negative for infarct or hemorrhage. CTA was negative for LVO.       Plan  REEG  Continue Keppra 500mg bid  Seizure precautions  Admit to floor

## 2021-04-08 NOTE — H&P ADULT - ASSESSMENT
Patient is 41 yo Female with PMHx migraines, pulmonary embolism on oral contraceptives about 6-7 years ago, COVID in 1/2021, and last seen in clinic on 3/2021 questionable TIA vs hypotensive convulsion, presenting today to ED after beeing seen by her o/p neurologist for seizure-like episode with observable left sided lip twitching and b/l UE and LE weakness, greater on the Left. At provider's clinic he was slightly dysarthric but A&Ox3, conversant and following commands. Vital signs were stable and after 10 minutes, symptoms resolved and strength in extremities improved. Patient did not appear to require ED visit. Case with discussed with Dr. Tafoya. We did not feel it was necessary to repeat brain imaging or admit patient to the hospital. We decided to place patient on Keppra 500 BID for seizure and have her obtain vEEG. MRI head 3/21/21 showed white matter changes in right suprasylvian subcortical and periatrial regions suspicious for small vessel change vs demyelinating disease. MRI Neck was negative, only showing minimal spondylitic changes. 24 hr aEEG was completed yesterday and not read yet. Patient states that she completed 30 day event monitor, will follow up report.     PLAN:  -Obtain vEEG  -Start Keppra 500 BID  -Instructed patient to call 911 or report to ED if any acute neurological changes occur  -Follow up in clinic PRN and after vEEG completed as soon as possible  -F/u 30 day even monitor report as well as 24hr a EEG report    Patient is 39 yo Female with PMHx migraines, pulmonary embolism on oral contraceptives about 6-7 years ago, COVID in 1/2021, and last seen in clinic on 3/2021 questionable TIA vs hypotensive convulsion, presenting today to ED after beeing seen by her o/p neurologist for seizure-like episode with observable left sided lip twitching and b/l UE and LE weakness, greater on the Left. At provider's clinic he was slightly dysarthric but A&Ox3, conversant and following commands. Vital signs were stable and after 10 minutes, symptoms resolved and strength in extremities improved. Patient did not appear to require ED visit. Case with discussed with Dr. Tafoya. We did not feel it was necessary to repeat brain imaging or admit patient to the hospital. We decided to place patient onand have her obtain vEEG. MRI head 3/21/21 showed white matter changes in right suprasylvian subcortical and periatrial regions suspicious for small vessel change vs demyelinating disease. MRI Neck was negative, only showing minimal spondylitic changes. 24 hr aEEG was completed yesterday and not read yet. Patient states that she completed 30 day event monitor, will follow up report.     Seizure-like episode, recurrent:  - started on Keppra 500 BID for seizure today o/p  - pending results of 30-day event monitor  PLAN:  - 24hr EEG  -Neurology consult (Dr Tafoya aware of ptn)  -   Patient is 41 yo Female with PMHx migraines, pulmonary embolism on oral contraceptives about 6-7 years ago, COVID in 1/2021, and last seen in clinic on 3/2021 questionable TIA vs hypotensive convulsion, presenting today to ED after being seen by her o/p neurologist for seizure-like episode with observable left sided lip twitching and b/l UE and LE weakness, greater on the Left.     Seizure-like episode, recurrent:  - MRI head 3/21/21 showed white matter changes in right suprasylvian subcortical and periatrial regions suspicious for small vessel change vs demyelinating disease.   - started on Keppra 500 BID for seizure today o/p  - pending results of 30-day event monitor  PLAN:  - c/w Keppra   - f/u results 24hr EEG  - vEEG per neurology  -Neurology consult (Dr Tafoya aware of ptn)     Patient is 39 yo Female with PMHx migraines, pulmonary embolism on oral contraceptives about 6-7 years ago, COVID in 1/2021, and last seen in clinic on 3/2021 questionable TIA vs hypotensive convulsion, presenting today to ED after being seen by her o/p neurologist for seizure-like episode with observable left sided lip twitching and b/l UE and LE weakness, greater on the Left.     Seizure-like episode, recurrent:  - MRI head 3/21/21 showed white matter changes in right suprasylvian subcortical and periatrial regions suspicious for small vessel change vs demyelinating disease.   - started on Keppra 500 BID for seizure today o/p  - pending results of 30-day event monitor  PLAN:  - c/w Keppra   - f/u results 24hr EEG  - vEEG per neurology  -Neurology consult (Dr Tafoya aware of ptn)   Patient is 39 yo Female with PMHx migraines, pulmonary embolism on oral contraceptives about 6-7 years ago, COVID in 1/2021, and last seen in clinic on 3/2021 questionable TIA vs hypotensive convulsion, presenting today to ED after being seen by her o/p neurologist for seizure-like episode with observable left sided lip twitching and b/l UE and LE weakness, greater on the Left.     Seizure-like episode, recurrent:  - MRI head 3/21/21 showed white matter changes in right suprasylvian subcortical and periatrial regions suspicious for small vessel change vs demyelinating disease.   - CT head/ neck on admission no acute intracranial pathology  - started on Keppra 500 BID for seizure today o/p  - pending results of 30-day event monitor, EEG    PLAN:  - c/w Keppra   - f/u results 24hr EEG, event monitor  - vEEG per neurology  -Neurology consult (Dr Tafoya aware of ptn)    # Hx of Provoked PE 7 years ago  - s/p Coumadin  - most recent CTA and duplex negative for PE and DVT 8/3/20  - no hx or multiple clots or abortions   - will monitor    #Misc  - DVT Prophylaxis: Lovenox  - GI Prophylaxis: not indicated  - Diet: Regular diet  - Activity: IAT    - Code Status:

## 2021-04-08 NOTE — H&P ADULT - ATTENDING COMMENTS
HPI:  39 yo woman RH dominant, with PMHx migraines, pulmonary embolism on oral contraceptives about 6-7 years ago (not currently on AC), diverticulitis, and COVID in 1/2021, and last seen in neurology clinic on day of admission for seizure-like episode with headaches, dizziness/vertigo, observable left sided lip twitching and b/l UE and LE weakness, greater on the Left. She was slightly dysarthric in clinic but A&Ox3, conversant and following commands. The day prior, ptn presented to ED with similar seizure-like activity and discharged from ED same day. In clinic vital signs were stable and after 10 minutes, symptoms resolved and strength in extremities improved. Later on during that same day her symptoms returned, she was evaluated at urgent care who spoke with neurology, and was advised to come to the ED for further work up.   She reports that her symptoms resolve after receiving keppra but can return hours later. Additionally she had an outpatient 24 hour EEG done but is still awaiting her results. MRI head 3/21/21 showed white matter changes in right suprasylvian subcortical and periatrial regions suspicious for small vessel change vs demyelinating disease.    She also is undergoing work up as an outpatient for intermittent bradycardia (symptomatic: extreme fatigue, she checks it with her pulse ox) with Dr Jefferson. She was given a holter monitor but states at that time she didn't have any symptoms, she is currently awaiting the final results. She reports the onset of all of these symptoms occurs following her COVID infection.     REVIEW OF SYSTEMS:  CONSTITUTIONAL:  No weakness, fevers, chills, night sweats, weight loss  EYES/ENT: No visual changes.+ vertigo. no dysphagia  NECK: No neck pain or stiffness  RESPIRATORY: No cough, wheezing, hemoptysis. No shortness of breath  CARDIOVASCULAR: No chest pain or palpitations. No lower extremity edema  GASTROINTESTINAL: No abdominal pain. No nausea, vomiting, diarrhea, or hematemesis  GENITOURINARY: No dysuria or hematuria   NEUROLOGICAL: No focal numbness or weakness. +headache, L lip twitching, extremity weakness L>R  SKIN: No rashes or itching  HEMATOLOGIC: No easy bruising or prolonged bleeding.      PHYSICAL EXAM:  GENERAL: NAD, obese, Non-toxic, stated age   HEAD:  Atraumatic, Normocephalic  EYES: EOMI, Sclera White   NECK: Supple, No JVD  CHEST/LUNG: Clear to auscultation bilaterally; No wheezing, rhonchi, or crackles  HEART: Regular rate and rhythm; s1, s2, No murmurs, rubs, or gallops  ABDOMEN: Soft, Nontender, Nondistended; Bowel sounds present, No rebound or guarding noted   EXTREMITIES:  No lower extremity edema or calf tenderness to palpation.  No clubbing or cyanosis  PSYCH: AAOx3, pleasant, cooperative, not anxious  NEUROLOGY: non-focal  SKIN: No rashes or lesions      ASSESSMENT AND PLAN:  Seizure-like activity: Neuro following, cont with keppra 500mg BID, check REEG. Doubt substance abuse, but can check Urine and serum toxicology. MRI on 3/21 was suggestive of demyelination vs small vessel changes. Ativan for significant break through seizures.    History of pulmonary embolism (was on OCP): currently not on AC, last [facility] LE duplex in feb 2021 was negative. Monitor clinically     Migraines: cont with IBU     Bradycardia: cont with out patient work up, if noted to be bradycardic/symptomatic here then start tele monitoring and may need a cardiology consult. For now, observe.     DVT ppx: Lovenox/Heparin  GI ppx: Not indicated  GOC: Full code.  My note supersedes the residents in the event of a discrepancy.

## 2021-04-08 NOTE — CONSULT NOTE ADULT - SUBJECTIVE AND OBJECTIVE BOX
CC: Dizziness and AMS      HPI:  41 yo RHF with PMHx of migraines, pulmonary embolism was on oral contraceptives about 6-7 years ago, COVID 1/2021, was seen  in neuro clinic on 3/2021 for episode of blacking out presenting   to ED for 2 days of fluctuating mental status , dizziness,  intermittent episodes of expressive aphasia and left facial twitching. She went to urgent care where they were in contact with her neurologist Dr. Jean Pretty who suggested she go to the hospital for admission for EEG and was prescribed Keppra which she took.  Stroke code was called but cancelled since patients symptoms are 2 days old and her current exam is nonfocal.  CTH was negative for infarct or hemorrhage. CTA was negative for LVO. MRI head 3/21/21 showed white matter changes in right suprasylvian subcortical and periatrial regions suspicious for small vessel change vs demyelinating disease. MRI Neck was negative.       Social History  Denies smoking alcohol or drug use      Family History  Father with Diabetes Mellitus      Neuro Exam:  Orientation: lethargic but able to answer all questions and follow commands with repeated instructions  Cranial Nerves: visual acuity intact bilaterally, visual fields full to confrontation, pupils equal round and reactive to light, extraocular motion intact, facial sensation intact symmetrically, face symmetrical, hearing was intact bilaterally, tongue and palate midline, head turning and shoulder shrug symmetric and there was no tongue deviation with protrusion.   Motor: Moving all extremities to antigravity, UE>LE  Sensory exam: Decreased on the right as per patient  Coordination:. No dysmetria or limb ataxia  Gait: deferred      On repeat exam patient was noted to be back to baseline        NIHSS:     Allergies    penicillin (Rash)    Intolerances      MEDICATIONS  (STANDING):    MEDICATIONS  (PRN):      LABS:                        13.1   5.77  )-----------( 264      ( 07 Apr 2021 20:40 )             39.2     04-07    133<L>  |  98  |  9<L>  ----------------------------<  141<H>  4.5   |  22  |  0.7    Ca    9.9      07 Apr 2021 20:40    TPro  7.3  /  Alb  4.5  /  TBili  0.2  /  DBili  x   /  AST  22  /  ALT  21  /  AlkPhos  88  04-07    PT/INR - ( 07 Apr 2021 20:40 )   PT: 11.50 sec;   INR: 1.00 ratio         PTT - ( 07 Apr 2021 20:40 )  PTT:37.0 sec            Neuro Imaging:  < from: CT Head No Cont and CTA H/N (04.07.21 @ 21:01) >  FINDINGS:    CT BRAIN:    There is no evidence for acute intracranial hemorrhage, mass effect or midline shift.    The basal cisterns are patent. There is no hydrocephalus.    There is no extra-axial collection.    The visualized orbits are unremarkable.    The calvarium is intact, without depressed fracture.    Patchy bilateral ethmoid air cell mucosal thickening as well as mucosal thickening of the right frontal sinus. Partially visualized right paranasal sinusitis with retention cyst or polyp    HEAD CTA:    The internal carotid arteries demonstrate normal enhancement to the intracranial circulation and [place] of [place].    Anterior and middle cerebral arteries demonstrate normal enhancement and symmetric arborization without intraluminal filling defect, stenosis, occlusion, aneurysm or vascular malformation.    The intradural vertebral arteries demonstrate normal enhancement to the vertebrobasilar confluence. Branch vasculature of the posterior circulation are within normal limits. The posterior cerebral arteries demonstrate symmetric enhancement and arborization without evidence for aneurysm, stenosis, occlusion or vascular malformation.    Visualized dural venous sinuses and deep cerebral venous structures demonstrate normal enhancement without evidence for filling defect.    NECK CTA:    The aortic arch and origins of the great vessels are within normal limits.    Right:  Theorigin of the right common carotid artery is normal. The right common carotid artery is normal in course and caliber to the carotid bifurcation. Origins of the internal and external carotid arteries are normal by NASCET criteria. The right internal carotid artery has normal course and caliber to the intracranial circulation.    The origin of the right vertebral artery is normal. The right vertebral artery is normal in course and caliber to the intracranial circulation.    Left: The origin of the left common carotid artery is normal. The left common carotid artery is normal in course and caliber to the carotid bifurcation. Origins of the internal and external carotid arteries are normal by NASCET criteria. The left internal carotid artery has normal course and caliber to the intracranial circulation.    The origin of the left vertebral artery is normal. The left vertebral artery is normal in course and caliber to the intracranial circulation.    IMPRESSION:    CT HEAD:  No acute intracranial pathology. No evidence of midline shift, mass effect or intracranial hemorrhage.    CTA HEAD:  No evidence of flow-limiting stenosis, occlusion or aneurysm.    CTA NECK:  No evidence of carotid or vertebral artery stenosis.      ASHLYN MCRAE M.D., ATTENDING RADIOLOGIST  This document has been electronically signed. Apr 7 2021  9:39PM    < end of copied text >    EEG:     Echo:   Carotid Doppler: N/A  Telemetry:              CC: Dizziness and AMS      HPI:  41 yo RHF with PMHx of migraines, pulmonary embolism was on oral contraceptives about 6-7 years ago, COVID 1/2021, was seen  in neuro clinic on 3/2021 for episode of blacking out presenting   to ED for 2 days of fluctuating mental status , dizziness,  intermittent episodes of expressive aphasia and left facial twitching. She went to urgent care and was sent to ED for further evaluation.  Stroke code was called but cancelled since patients symptoms are 2 days old and her current exam is nonfocal.  CTH was negative for infarct or hemorrhage. CTA was negative for LVO. MRI head 3/21/21 showed white matter changes in right suprasylvian subcortical and periatrial regions suspicious for small vessel change vs demyelinating disease. MRI Neck was negative.       Social History  Denies smoking alcohol or drug use      Family History  Father with Diabetes Mellitus      Neuro Exam:  Orientation: lethargic but able to answer all questions and follow commands with repeated instructions  Cranial Nerves: visual acuity intact bilaterally, visual fields full to confrontation, pupils equal round and reactive to light, extraocular motion intact, facial sensation intact symmetrically, face symmetrical, hearing was intact bilaterally, tongue and palate midline, head turning and shoulder shrug symmetric and there was no tongue deviation with protrusion.   Motor: Moving all extremities to antigravity, UE>LE  Sensory exam: Decreased on the right as per patient  Coordination:. No dysmetria or limb ataxia  Gait: deferred      On repeat exam patient was noted to be back to baseline        NIHSS:     Allergies    penicillin (Rash)    Intolerances      MEDICATIONS  (STANDING):    MEDICATIONS  (PRN):      LABS:                        13.1   5.77  )-----------( 264      ( 07 Apr 2021 20:40 )             39.2     04-07    133<L>  |  98  |  9<L>  ----------------------------<  141<H>  4.5   |  22  |  0.7    Ca    9.9      07 Apr 2021 20:40    TPro  7.3  /  Alb  4.5  /  TBili  0.2  /  DBili  x   /  AST  22  /  ALT  21  /  AlkPhos  88  04-07    PT/INR - ( 07 Apr 2021 20:40 )   PT: 11.50 sec;   INR: 1.00 ratio         PTT - ( 07 Apr 2021 20:40 )  PTT:37.0 sec            Neuro Imaging:  < from: CT Head No Cont and CTA H/N (04.07.21 @ 21:01) >  FINDINGS:    CT BRAIN:    There is no evidence for acute intracranial hemorrhage, mass effect or midline shift.    The basal cisterns are patent. There is no hydrocephalus.    There is no extra-axial collection.    The visualized orbits are unremarkable.    The calvarium is intact, without depressed fracture.    Patchy bilateral ethmoid air cell mucosal thickening as well as mucosal thickening of the right frontal sinus. Partially visualized right paranasal sinusitis with retention cyst or polyp    HEAD CTA:    The internal carotid arteries demonstrate normal enhancement to the intracranial circulation and [place] of [place].    Anterior and middle cerebral arteries demonstrate normal enhancement and symmetric arborization without intraluminal filling defect, stenosis, occlusion, aneurysm or vascular malformation.    The intradural vertebral arteries demonstrate normal enhancement to the vertebrobasilar confluence. Branch vasculature of the posterior circulation are within normal limits. The posterior cerebral arteries demonstrate symmetric enhancement and arborization without evidence for aneurysm, stenosis, occlusion or vascular malformation.    Visualized dural venous sinuses and deep cerebral venous structures demonstrate normal enhancement without evidence for filling defect.    NECK CTA:    The aortic arch and origins of the great vessels are within normal limits.    Right:  Theorigin of the right common carotid artery is normal. The right common carotid artery is normal in course and caliber to the carotid bifurcation. Origins of the internal and external carotid arteries are normal by NASCET criteria. The right internal carotid artery has normal course and caliber to the intracranial circulation.    The origin of the right vertebral artery is normal. The right vertebral artery is normal in course and caliber to the intracranial circulation.    Left: The origin of the left common carotid artery is normal. The left common carotid artery is normal in course and caliber to the carotid bifurcation. Origins of the internal and external carotid arteries are normal by NASCET criteria. The left internal carotid artery has normal course and caliber to the intracranial circulation.    The origin of the left vertebral artery is normal. The left vertebral artery is normal in course and caliber to the intracranial circulation.    IMPRESSION:    CT HEAD:  No acute intracranial pathology. No evidence of midline shift, mass effect or intracranial hemorrhage.    CTA HEAD:  No evidence of flow-limiting stenosis, occlusion or aneurysm.    CTA NECK:  No evidence of carotid or vertebral artery stenosis.      ASHLYN MCRAE M.D., ATTENDING RADIOLOGIST  This document has been electronically signed. Apr 7 2021  9:39PM    < end of copied text >    EEG:     Echo:   Carotid Doppler: N/A  Telemetry:

## 2021-04-08 NOTE — H&P ADULT - NSHPLABSRESULTS_GEN_ALL_CORE
LABS:                        13.1   5.77  )-----------( 264      ( 07 Apr 2021 20:40 )             39.2     04-07    133<L>  |  98  |  9<L>  ----------------------------<  141<H>  4.5   |  22  |  0.7    Ca    9.9      07 Apr 2021 20:40    TPro  7.3  /  Alb  4.5  /  TBili  0.2  /  DBili  x   /  AST  22  /  ALT  21  /  AlkPhos  88  04-07    PT/INR - ( 07 Apr 2021 20:40 )   PT: 11.50 sec;   INR: 1.00 ratio         PTT - ( 07 Apr 2021 20:40 )  PTT:37.0 sec      Lactate, Blood: 1.1 mmol/L (04-07-21 @ 20:40)  Troponin T, Serum: <0.01 ng/mL (04-07-21 @ 20:40)      CARDIAC MARKERS ( 07 Apr 2021 20:40 )  x     / <0.01 ng/mL / x     / x     / x

## 2021-04-08 NOTE — H&P ADULT - NSHPREVIEWOFSYSTEMS_GEN_ALL_CORE
GEN:  no fever, no chills, no generalized weakness  NEURO:  no headache, +difficulty with balance, +left-sided face twitching, +dizziness  ENT: no sore throat, no runny nose  CV:  no chest pain, no palpitations  RESP:  no sob, no cough  GI:  no nausea, no vomiting, no abdominal pain, no diarrhea  :  no dysuria, no urinary frequency, no hematuria  MSK:  no joint pain, no edema  SKIN:  no rash, no bruising  HEME: no hematochezia, no melena

## 2021-04-08 NOTE — H&P ADULT - HISTORY OF PRESENT ILLNESS
Patient is 41 yo Female RH dominant, with PMHx migraines, pulmonary embolism on oral contraceptives about 6-7 years ago, COVID in 1/2021, and last seen in clinic on 3/2021 for episode of blacking out, questionable TIA vs hypotensive convulsion. At that time, she admitted to spinal stenosis of her lower back. Physical exam was negative for left sided weakness, but had crossed sensory recognition, vibration stronger on the left and temperature better on right. Brisker DTR on the Left.  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 She was loaded with Ativan and Keppra. Patient left AMA.     In clinic today, patient appeared to have another seizure-like episode with observable left sided lip twitching and b/l UE and LE weakness, greater on the Left. She was slightly dysarthric but A&Ox3, conversant and following commands. Vital signs were stable and after 10 minutes, symptoms resolved and strength in extremities improved. Patient did not appear to require ED visit.     MRI head 3/21/21 showed white matter changes in right suprasylvian subcortical and periatrial regions suspicious for small vessel change vs demyelinating disease. MRI Neck was negative, only showing minimal spondylitic changes. 24 hr aEEG was completed yesterday and not read yet. Patient states that she completed 30 day event monitor, will follow up report.        Patient is 41 yo Female RH dominant, with PMHx migraines, pulmonary embolism on oral contraceptives about 6-7 years ago, COVID in 1/2021, and last seen in neurology clinic on day of admission for seizure-like episode with observable left sided lip twitching and b/l UE and LE weakness, greater on the Left. She was slightly dysarthric in clinic but A&Ox3, conversant and following commands. Vital signs were stable and after 10 minutes, symptoms resolved and strength in extremities improved. Patient did not appear to require ED however after leaving symptoms returned so patient presented to ED.    MRI head 3/21/21 showed white matter changes in right suprasylvian subcortical and periatrial regions suspicious for small vessel change vs demyelinating disease. MRI Neck was negative, only showing minimal spondylitic changes. 24 hr aEEG was completed yesterday and not read yet. Patient states that she completed 30 day event monitor, report pending.        Patient is 41 yo Female RH dominant, with PMHx migraines, pulmonary embolism on oral contraceptives about 6-7 years ago, COVID in 1/2021, and last seen in neurology clinic on day of admission for seizure-like episode with observable left sided lip twitching and b/l UE and LE weakness, greater on the Left. She was slightly dysarthric in clinic but A&Ox3, conversant and following commands. Vital signs were stable and after 10 minutes, symptoms resolved and strength in extremities improved. Patient did not appear to require ED however after leaving symptoms returned so patient presented to ED.    In the ED HR: 94  BP: 129/72 RR: 18  SpO2: 100%.                Patient is 41 yo Female RH dominant, with PMHx migraines, pulmonary embolism on oral contraceptives about 6-7 years ago, COVID in 1/2021, and last seen in neurology clinic on day of admission for seizure-like episode with observable left sided lip twitching and b/l UE and LE weakness, greater on the Left. She was slightly dysarthric in clinic but A&Ox3, conversant and following commands. Vital signs were stable and after 10 minutes, symptoms resolved and strength in extremities improved. Patient did not appear to require ED however after leaving symptoms returned so patient presented to ED.    In the ED HR: 94  BP: 129/72 RR: 18  SpO2: 100%. Labs unremarkable. O/p 24 EEG pending read. Ptn admitted to medicine for neurology workup of seizure-like facial twitching                 Patient is 39 yo Female RH dominant, with PMHx migraines, pulmonary embolism on oral contraceptives about 6-7 years ago (not currently on AC), diverticulitis, and COVID in 1/2021, and last seen in neurology clinic on day of admission for seizure-like episode with observable left sided lip twitching and b/l UE and LE weakness, greater on the Left. She was slightly dysarthric in clinic but A&Ox3, conversant and following commands. The day prior, ptn presented to Ed with similar seizure-like activity and discharged from ED same day. In clinic vital signs were stable and after 10 minutes, symptoms resolved and strength in extremities improved. Patient did not appear to require ED however after leaving symptoms returned so patient presented to ED.    In the ED HR: 94  BP: 129/72 RR: 18  SpO2: 100%. Labs unremarkable. O/p 24 EEG pending read. Ptn admitted to medicine for neurology workup of seizure-like activity                Patient is 39 yo Female RH dominant, with PMHx migraines, pulmonary embolism on oral contraceptives about 6-7 years ago (not currently on AC), diverticulitis, and COVID in 1/2021, and last seen in neurology clinic on day of admission for seizure-like episode with observable left sided lip twitching and b/l UE and LE weakness, greater on the Left. She was slightly dysarthric in clinic but A&Ox3, conversant and following commands. The day prior, ptn presented to ED with similar seizure-like activity and discharged from ED same day. In clinic vital signs were stable and after 10 minutes, symptoms resolved and strength in extremities improved. Patient did not appear to require ED however after leaving symptoms returned so patient presented to ED.    In the ED HR: 94  BP: 129/72 RR: 18  SpO2: 100%. Labs unremarkable. O/p 24 EEG pending read. Ptn admitted to medicine for neurology workup of seizure-like activity

## 2021-04-08 NOTE — CONSULT NOTE ADULT - ATTENDING COMMENTS
Patient examined in ED this morning.  She was more awaking.  Asking questions appropriately.  Last seizure 2 am.  She may be transferred to [facility] Epilepsy monitoring unit for further assessment.

## 2021-04-08 NOTE — CHART NOTE - NSCHARTNOTEFT_GEN_A_CORE
Rapid Response PGY 2 Note  Patient is a 40y old  Female admitted for seizure   Rapid response team called because of seizure, pt started seizing with eyes rolled back and shakes, ativan 2mg administered, keppra 500mg IV ordered and seizure subsided. STAT labs obtained. Neurology cponsulted. D/w Dr. Meraz, pt to be transferred to [facility] for epilepsy unit.     Patient was seen and examined at the bedside by the rapid response team.    Allergies    penicillin (Rash)    Intolerances        PAST MEDICAL & SURGICAL HISTORY:  Pulmonary embolism    Diverticulitis    COVID-19    H/O:  section  3x    History of appendectomy        Vital Signs Last 24 Hrs  T(C): 36.8 (2021 07:58), Max: 36.8 (2021 07:58)  T(F): 98.2 (2021 07:58), Max: 98.2 (2021 07:58)  HR: 73 (2021 13:00) (52 - 94)  BP: 127/78 (2021 13:00) (110/65 - 129/75)  BP(mean): --  RR: 18 (2021 13:00) (17 - 18)  SpO2: 100% (2021 13:00) (98% - 100%)          GENERAL: The patient is awake and alert in no apparent distress.   HEENT: Head is normocephalic and atraumatic. Extraocular muscles are intact. Mucous membranes are moist. No throat erythema/exudates no lymphadenopathy, no JVD,   NECK: Supple.  LUNGS: Clear to auscultation BL without wheezing, rales or rhonchi; respirations unlabored  HEART: Regular rate and rhythm ,+S1/+S2, no murmurs, rubs, gallops  ABDOMEN: Soft, nontender, and nondistended, no rebound, guarding rigidity, bowel sounds in all 4 quadrants  EXTREMITIES: Without any cyanosis, clubbing, rash, lesions or edema.  SKIN: No new rashes or lesions.  MSK: strength equal BL  VASCULAR: Radial and Dorsal pedal pulses palpable BL  NEUROLOGIC: Grossly intact., AAO x 3, slightly confused  PSYCH: No new changes.                            12.0   4.05  )-----------( 234      ( 2021 11:45 )             36.9     04-07    133<L>  |  98  |  9<L>  ----------------------------<  141<H>  4.5   |  22  |  0.7    Ca    9.9      2021 20:40    TPro  7.3  /  Alb  4.5  /  TBili  0.2  /  DBili  x   /  AST  22  /  ALT  21  /  AlkPhos  88           LIVER FUNCTIONS - ( 2021 20:40 )  Alb: 4.5 g/dL / Pro: 7.3 g/dL / ALK PHOS: 88 U/L / ALT: 21 U/L / AST: 22 U/L / GGT: x              PT/INR - ( 2021 20:40 )   PT: 11.50 sec;   INR: 1.00 ratio         PTT - ( 2021 20:40 )  PTT:37.0 sec    Vital Signs Last 24 Hrs*     # seizure  - c/w keppra  - ativan iv prn  - ct and mr already done as out patient  - transfer to [facility] for epilepsy study  - f/u Mg and lactate, CK  - seizure precautions, keep Mg > 2.0 Rapid Response PGY 2 Note  Patient is a 40y old  Female admitted for seizure   Rapid response team called because of seizure, pt started seizing with eyes rolled back and shakes, ativan 2mg administered, keppra 500mg IV ordered and seizure subsided. STAT labs obtained. Neurology cponsulted. D/w Dr. Meraz, pt to be transferred to [facility] for epilepsy unit.     Patient was seen and examined at the bedside by the rapid response team.    Allergies    penicillin (Rash)    Intolerances        PAST MEDICAL & SURGICAL HISTORY:  Pulmonary embolism    Diverticulitis    COVID-19    H/O:  section  3x    History of appendectomy        Vital Signs Last 24 Hrs  T(C): 36.8 (2021 07:58), Max: 36.8 (2021 07:58)  T(F): 98.2 (2021 07:58), Max: 98.2 (2021 07:58)  HR: 73 (2021 13:00) (52 - 94)  BP: 127/78 (2021 13:00) (110/65 - 129/75)  BP(mean): --  RR: 18 (2021 13:00) (17 - 18)  SpO2: 100% (2021 13:00) (98% - 100%)          GENERAL: The patient is awake and alert in no apparent distress.   HEENT: Head is normocephalic and atraumatic. Extraocular muscles are intact. Mucous membranes are moist. No throat erythema/exudates no lymphadenopathy, no JVD,   NECK: Supple.  LUNGS: Clear to auscultation BL without wheezing, rales or rhonchi; respirations unlabored  HEART: Regular rate and rhythm ,+S1/+S2, no murmurs, rubs, gallops  ABDOMEN: Soft, nontender, and nondistended, no rebound, guarding rigidity, bowel sounds in all 4 quadrants  EXTREMITIES: Without any cyanosis, clubbing, rash, lesions or edema.  SKIN: No new rashes or lesions.  MSK: strength equal BL  VASCULAR: Radial and Dorsal pedal pulses palpable BL  NEUROLOGIC: Grossly intact., AAO x 3, slightly confused  PSYCH: No new changes.                            12.0   4.05  )-----------( 234      ( 2021 11:45 )             36.9     04-07    133<L>  |  98  |  9<L>  ----------------------------<  141<H>  4.5   |  22  |  0.7    Ca    9.9      2021 20:40    TPro  7.3  /  Alb  4.5  /  TBili  0.2  /  DBili  x   /  AST  22  /  ALT  21  /  AlkPhos  88           LIVER FUNCTIONS - ( 2021 20:40 )  Alb: 4.5 g/dL / Pro: 7.3 g/dL / ALK PHOS: 88 U/L / ALT: 21 U/L / AST: 22 U/L / GGT: x              PT/INR - ( 2021 20:40 )   PT: 11.50 sec;   INR: 1.00 ratio         PTT - ( 2021 20:40 )  PTT:37.0 sec    Vital Signs Last 24 Hrs*     # seizure  - c/w keppra  - ativan iv prn  - ct and mr already done as out patient  - transfer to [facility] for epilepsy study  - f/u Mg and lactate, CK  - seizure precautions, keep Mg > 2.0    Attending note:  Rapid response called. Pt seen and examined at bedside. Pt had witnessed seizure. Pt given ativan and szr broke.  Pt complained of visual changes  stroke code called. Pt assessed again and had no visual changesm she stated that she was unable to open her eyes  Neuro assessed pt and stated no HCT was nessesary as pt got one today and neg. Also cancelled stroke code.     vitals as above stable  exam non focal, ctabl, no murmurs, RR, pt speaking full sentences no dysphagia, no edema  labs reviewed: new labs sent follow up  recent imaging reviewed  Assessment and plan as above neeru resident in real time  Neuro cleared pt to go to VEEG unit at [facility]  Updated [facility] site attending Dr. Sarabia of new seizure and neuro clearance to go [facility] bed management pt has a bed at [facility].   Medically stable to transfer.   >30 mins of Critical care time was spent on pt

## 2021-04-08 NOTE — CHART NOTE - NSCHARTNOTEFT_GEN_A_CORE
I was called to evaluate the patient for seizure like activity    Patient had abnormal Limb movement and facial twitching that lasted for several minutes, and resolved.  No Involuntary BM, or urinary pattern.  No post-ictal state.    patient is alert, and orientated X3  Neurologically intact    Constitutional: No fever, fatigue or weight loss.  Skin: No rash.  Eyes: No recent vision problems or eye pain.  ENT: No congestion, ear pain, or sore throat.  Endocrine: No thyroid problems.  Cardiovascular: No chest pain or palpation.  Respiratory: No cough, shortness of breath, congestion, or wheezing.  Gastrointestinal: No abdominal pain, nausea, vomiting, or diarrhea.  Genitourinary: No dysuria.  Musculoskeletal: No joint swelling.  Neurologic: No headache.    Vital Signs Last 24 Hrs  T(C): 36.1 (08 Apr 2021 16:15), Max: 36.8 (08 Apr 2021 07:58)  T(F): 97 (08 Apr 2021 16:15), Max: 98.2 (08 Apr 2021 07:58)  HR: 75 (08 Apr 2021 16:15) (52 - 94)  BP: 120/65 (08 Apr 2021 16:15) (110/65 - 129/75)  BP(mean): --  RR: 18 (08 Apr 2021 16:15) (17 - 18)  SpO2: 100% (08 Apr 2021 16:15) (98% - 100%)      PHYSICAL EXAM-  GENERAL: NAD,    HEAD:  Atraumatic, Normocephalic  EYES: EOMI, PERRLA, conjunctiva and sclera clear  NECK: Supple, No JVD, Normal thyroid  NERVOUS SYSTEM:  Alert & Oriented X3, Moving all extremities  CHEST/LUNG: Clear to percussion bilaterally; No rales, rhonchi, wheezing, or rubs  HEART: Regular rate and rhythm; No murmurs, rubs, or gallops  ABDOMEN: Soft, Nontender, Nondistended; Bowel sounds present  EXTREMITIES:  2+ Peripheral Pulses, No clubbing, cyanosis, or edema  SKIN: No rashes or lesions    A/P Seizure like activity.  discussed case with neurology who recommended to administer Night dose of keppra now  seizure precaution  VEEG  Ativan PRN  Keppra BID  neurology to follow up

## 2021-04-09 LAB
ALBUMIN SERPL ELPH-MCNC: 4 G/DL — SIGNIFICANT CHANGE UP (ref 3.5–5.2)
ALP SERPL-CCNC: 74 U/L — SIGNIFICANT CHANGE UP (ref 30–115)
ALT FLD-CCNC: 18 U/L — SIGNIFICANT CHANGE UP (ref 0–41)
ANION GAP SERPL CALC-SCNC: 13 MMOL/L — SIGNIFICANT CHANGE UP (ref 7–14)
AST SERPL-CCNC: 18 U/L — SIGNIFICANT CHANGE UP (ref 0–41)
BASOPHILS # BLD AUTO: 0.02 K/UL — SIGNIFICANT CHANGE UP (ref 0–0.2)
BASOPHILS NFR BLD AUTO: 0.4 % — SIGNIFICANT CHANGE UP (ref 0–1)
BILIRUB SERPL-MCNC: 0.3 MG/DL — SIGNIFICANT CHANGE UP (ref 0.2–1.2)
BUN SERPL-MCNC: 9 MG/DL — LOW (ref 10–20)
CALCIUM SERPL-MCNC: 9.3 MG/DL — SIGNIFICANT CHANGE UP (ref 8.5–10.1)
CHLORIDE SERPL-SCNC: 105 MMOL/L — SIGNIFICANT CHANGE UP (ref 98–110)
CO2 SERPL-SCNC: 21 MMOL/L — SIGNIFICANT CHANGE UP (ref 17–32)
CREAT SERPL-MCNC: 0.6 MG/DL — LOW (ref 0.7–1.5)
EOSINOPHIL # BLD AUTO: 0.09 K/UL — SIGNIFICANT CHANGE UP (ref 0–0.7)
EOSINOPHIL NFR BLD AUTO: 1.9 % — SIGNIFICANT CHANGE UP (ref 0–8)
GLUCOSE SERPL-MCNC: 96 MG/DL — SIGNIFICANT CHANGE UP (ref 70–99)
HCT VFR BLD CALC: 36.4 % — LOW (ref 37–47)
HGB BLD-MCNC: 11.9 G/DL — LOW (ref 12–16)
IMM GRANULOCYTES NFR BLD AUTO: 0.4 % — HIGH (ref 0.1–0.3)
LYMPHOCYTES # BLD AUTO: 1.71 K/UL — SIGNIFICANT CHANGE UP (ref 1.2–3.4)
LYMPHOCYTES # BLD AUTO: 36.2 % — SIGNIFICANT CHANGE UP (ref 20.5–51.1)
MAGNESIUM SERPL-MCNC: 1.9 MG/DL — SIGNIFICANT CHANGE UP (ref 1.8–2.4)
MCHC RBC-ENTMCNC: 29.7 PG — SIGNIFICANT CHANGE UP (ref 27–31)
MCHC RBC-ENTMCNC: 32.7 G/DL — SIGNIFICANT CHANGE UP (ref 32–37)
MCV RBC AUTO: 90.8 FL — SIGNIFICANT CHANGE UP (ref 81–99)
MONOCYTES # BLD AUTO: 0.45 K/UL — SIGNIFICANT CHANGE UP (ref 0.1–0.6)
MONOCYTES NFR BLD AUTO: 9.5 % — HIGH (ref 1.7–9.3)
NEUTROPHILS # BLD AUTO: 2.43 K/UL — SIGNIFICANT CHANGE UP (ref 1.4–6.5)
NEUTROPHILS NFR BLD AUTO: 51.6 % — SIGNIFICANT CHANGE UP (ref 42.2–75.2)
NRBC # BLD: 0 /100 WBCS — SIGNIFICANT CHANGE UP (ref 0–0)
PHOSPHATE SERPL-MCNC: 3.3 MG/DL — SIGNIFICANT CHANGE UP (ref 2.1–4.9)
PLATELET # BLD AUTO: 251 K/UL — SIGNIFICANT CHANGE UP (ref 130–400)
POTASSIUM SERPL-MCNC: 4.3 MMOL/L — SIGNIFICANT CHANGE UP (ref 3.5–5)
POTASSIUM SERPL-SCNC: 4.3 MMOL/L — SIGNIFICANT CHANGE UP (ref 3.5–5)
PROT SERPL-MCNC: 6.5 G/DL — SIGNIFICANT CHANGE UP (ref 6–8)
RBC # BLD: 4.01 M/UL — LOW (ref 4.2–5.4)
RBC # FLD: 12.3 % — SIGNIFICANT CHANGE UP (ref 11.5–14.5)
SODIUM SERPL-SCNC: 139 MMOL/L — SIGNIFICANT CHANGE UP (ref 135–146)
WBC # BLD: 4.72 K/UL — LOW (ref 4.8–10.8)
WBC # FLD AUTO: 4.72 K/UL — LOW (ref 4.8–10.8)

## 2021-04-09 PROCEDURE — 99232 SBSQ HOSP IP/OBS MODERATE 35: CPT

## 2021-04-09 PROCEDURE — 95720 EEG PHY/QHP EA INCR W/VEEG: CPT

## 2021-04-09 PROCEDURE — 99233 SBSQ HOSP IP/OBS HIGH 50: CPT

## 2021-04-09 RX ADMIN — PANTOPRAZOLE SODIUM 40 MILLIGRAM(S): 20 TABLET, DELAYED RELEASE ORAL at 05:05

## 2021-04-09 RX ADMIN — Medication 400 MILLIGRAM(S): at 09:51

## 2021-04-09 RX ADMIN — LEVETIRACETAM 500 MILLIGRAM(S): 250 TABLET, FILM COATED ORAL at 05:01

## 2021-04-09 RX ADMIN — ENOXAPARIN SODIUM 40 MILLIGRAM(S): 100 INJECTION SUBCUTANEOUS at 14:16

## 2021-04-09 RX ADMIN — Medication 400 MILLIGRAM(S): at 11:31

## 2021-04-10 ENCOUNTER — TRANSCRIPTION ENCOUNTER (OUTPATIENT)
Age: 41
End: 2021-04-10

## 2021-04-10 VITALS
HEART RATE: 68 BPM | SYSTOLIC BLOOD PRESSURE: 105 MMHG | DIASTOLIC BLOOD PRESSURE: 61 MMHG | RESPIRATION RATE: 16 BRPM | TEMPERATURE: 98 F

## 2021-04-10 PROCEDURE — 99239 HOSP IP/OBS DSCHRG MGMT >30: CPT

## 2021-04-10 PROCEDURE — 99238 HOSP IP/OBS DSCHRG MGMT 30/<: CPT

## 2021-04-10 PROCEDURE — 95720 EEG PHY/QHP EA INCR W/VEEG: CPT

## 2021-04-10 RX ORDER — LEVETIRACETAM 250 MG/1
1 TABLET, FILM COATED ORAL
Qty: 0 | Refills: 0 | DISCHARGE

## 2021-04-10 RX ADMIN — PANTOPRAZOLE SODIUM 40 MILLIGRAM(S): 20 TABLET, DELAYED RELEASE ORAL at 05:28

## 2021-04-10 RX ADMIN — ENOXAPARIN SODIUM 40 MILLIGRAM(S): 100 INJECTION SUBCUTANEOUS at 11:35

## 2021-04-10 NOTE — DISCHARGE NOTE PROVIDER - CARE PROVIDERS DIRECT ADDRESSES
,[email or web address]

## 2021-04-10 NOTE — DISCHARGE NOTE PROVIDER - NSDCCPCAREPLAN_GEN_ALL_CORE_FT
PRINCIPAL DISCHARGE DIAGNOSIS  Diagnosis: Facial spasm  Assessment and Plan of Treatment:        PRINCIPAL DISCHARGE DIAGNOSIS  Diagnosis: Facial spasm  Assessment and Plan of Treatment: outpatient follow up with neurology in one week

## 2021-04-10 NOTE — DISCHARGE NOTE PROVIDER - PROVIDER TOKENS
PROVIDER:[TOKEN:[[id number]]] PROVIDER:[TOKEN:[[id number]]],PROVIDER:[TOKEN:[[id number]],FOLLOWUP:[1 week]]

## 2021-04-10 NOTE — DISCHARGE NOTE PROVIDER - HOSPITAL COURSE
Patient is 41 yo Female with PMHx migraines, pulmonary embolism on oral contraceptives about 6-7 years ago, COVID in 1/2021, and last seen in clinic on 3/2021 questionable TIA vs hypotensive convulsion, presenting today to ED after being seen by her o/p neurologist for seizure-like episode with observable left sided lip twitching and b/l UE and LE weakness, greater on the Left.     Seizure-like episode, recurrent:  - MRI head 3/21/21 showed white matter changes in right suprasylvian subcortical and periatrial regions suspicious for small vessel change vs demyelinating disease.   - CT head/ neck on admission no acute intracranial pathology  - VEEG shows no seizure activity  -Taper off keppra as per neurology  -Completed VEEG  VEEG in the last 24 hours:    Background----------symmetrical, well organized reactive and a PDR reaching 8-9 hz  Focal and generalized slowing---------  none  Interictal activity----------- none  Events------ during this session of the monitoring patient had few episodes/events that clinically could be characterized as rhythmic, intermittent  bobbing  movements of the head while responding.                   she also reported not being able to see during  that. None were associated with epileptiform EEG changes from the baseline  Seizures----------  none  Impression: none epileptic events as described above. Normal EEG    neurology Plan - The results were discussed extensively with her . The concept of none epileptic events were also discussed            questions were answered will DC the Keppra and continue the monitoring on no AED          seizure precaution  #Progress Note Handoff  Pending (specify):  further care as per neurology  Family discussion:  plan of care was discussed with patient  in details.  all questions were answered.  seems to understand, and in agreement  Disposition:  unknown     Patient is 39 yo Female with PMHx migraines, pulmonary embolism on oral contraceptives about 6-7 years ago, COVID in 1/2021, and last seen in clinic on 3/2021 questionable TIA vs hypotensive convulsion, presenting today to ED after being seen by her o/p neurologist for seizure-like episode with observable left sided lip twitching and b/l UE and LE weakness, greater on the Left.     Seizure-like episode, recurrent:  - MRI head 3/21/21 showed white matter changes in right suprasylvian subcortical and periatrial regions suspicious for small vessel change vs demyelinating disease.   - CT head/ neck on admission no acute intracranial pathology  - VEEG shows no seizure activity  -Taper off keppra as per neurology  -Completed VEEG  VEEG in the last 24 hours:    Background----------symmetrical, well organized reactive and a PDR reaching 8-9 hz  Focal and generalized slowing---------  none  Interictal activity----------- none  Events------ during this session of the monitoring patient had few episodes/events that clinically could be characterized as rhythmic, intermittent  bobbing  movements of the head while responding.                   she also reported not being able to see during  that. None were associated with epileptiform EEG changes from the baseline  Seizures----------  none  Impression: none epileptic events as described above. Normal EEG    neurology Plan - The results were discussed extensively with her . The concept of none epileptic events were also discussed            questions were answered will DC the Keppra and continue the monitoring on no AED          seizure precaution  discussed case with Regular neurology, and epilepsy neurology who recommended outpatient follow up with her Dr. robertson in one week  Patient was seen and examined today  feels better.  No more seizure like activity  offers no new complaints  Constitutional: No fever, fatigue or weight loss.  Skin: No rash.  Eyes: No recent vision problems or eye pain.  ENT: No congestion, ear pain, or sore throat.  Endocrine: No thyroid problems.  Cardiovascular: No chest pain or palpation.  Respiratory: No cough, shortness of breath, congestion, or wheezing.  Gastrointestinal: No abdominal pain, nausea, vomiting, or diarrhea.  Genitourinary: No dysuria.  Musculoskeletal: No joint swelling.  Neurologic: No headache.  Vital Signs Last 24 Hrs  T(C): 36.2 (10 Apr 2021 04:40), Max: 36.4 (09 Apr 2021 21:24)  T(F): 97.1 (10 Apr 2021 04:40), Max: 97.5 (09 Apr 2021 21:24)  HR: 65 (10 Apr 2021 04:40) (65 - 74)  BP: 113/56 (10 Apr 2021 04:40) (107/53 - 113/56)  BP(mean): --  RR: 16 (10 Apr 2021 04:40) (16 - 16)  SpO2: --    PHYSICAL EXAM-  GENERAL: NAD, well-groomed, well-developed  HEAD:  Atraumatic, Normocephalic  EYES: EOMI, PERRLA, conjunctiva and sclera clear  NECK: Supple, No JVD, Normal thyroid  NERVOUS SYSTEM:  Alert & Oriented X3, Motor Strength 5/5 B/L upper and lower extremities; DTRs 2+ intact and symmetric  CHEST/LUNG: Clear to percussion bilaterally; No rales, rhonchi, wheezing, or rubs  HEART: Regular rate and rhythm; No murmurs, rubs, or gallops  ABDOMEN: Soft, Nontender, Nondistended; Bowel sounds present  EXTREMITIES:  2+ Peripheral Pulses, No clubbing, cyanosis, or edema  SKIN: No rashes or lesions  Hospital course, and discharge planning were discussed with patient, and  in details.  all questions were answered.  seems to understand, and in agreement.  time 70 min

## 2021-04-10 NOTE — DISCHARGE NOTE PROVIDER - NSDCFUADDAPPT_GEN_ALL_CORE_FT
Please follow up with your neurology and your doctor in one week  Please come back to the hospital if you develop any new symptoms or concerns

## 2021-04-10 NOTE — PROGRESS NOTE ADULT - SUBJECTIVE AND OBJECTIVE BOX
CC.  Seizure Like activity  HPI.  Patient reports that she feels better.  Offers no other complaints              Constitutional: No fever, fatigue or weight loss.  Skin: No rash.  Eyes: No recent vision problems or eye pain.  ENT: No congestion, ear pain, or sore throat.  Endocrine: No thyroid problems.  Cardiovascular: No chest pain or palpation.  Respiratory: No cough, shortness of breath, congestion, or wheezing.  Gastrointestinal: No abdominal pain, nausea, vomiting, or diarrhea.  Genitourinary: No dysuria.  Musculoskeletal: No joint swelling.  Neurologic: No headache.      Vital Signs Last 24 Hrs  T(C): 36.1 (04-09-21 @ 04:57), Max: 37.4 (04-08-21 @ 19:44)  T(F): 96.9 (04-09-21 @ 04:57), Max: 99.3 (04-08-21 @ 19:44)  HR: 72 (04-09-21 @ 09:46) (71 - 94)  BP: 134/60 (04-09-21 @ 09:46) (120/65 - 141/84)  BP(mean): --  RR: 15 (04-09-21 @ 04:57) (15 - 18)  SpO2: 100% (04-08-21 @ 16:15) (100% - 100%)        PHYSICAL EXAM-  GENERAL: NAD,    HEAD:  Atraumatic, Normocephalic  EYES: EOMI, PERRLA, conjunctiva and sclera clear  NECK: Supple, No JVD, Normal thyroid  NERVOUS SYSTEM:  Alert & Oriented X3, Moving all extremities  CHEST/LUNG: Clear to percussion bilaterally; No rales, rhonchi, wheezing, or rubs  HEART: Regular rate and rhythm; No murmurs, rubs, or gallops  ABDOMEN: Soft, Nontender, Nondistended; Bowel sounds present  EXTREMITIES:  2+ Peripheral Pulses, No clubbing, cyanosis, or edema  SKIN: No rashes or lesions                                  11.9   4.72  )-----------( 251      ( 09 Apr 2021 06:44 )             36.4     04-09    139  |  105  |  9<L>  ----------------------------<  96  4.3   |  21  |  0.6<L>    Ca    9.3      09 Apr 2021 06:44  Phos  3.3     04-09  Mg     1.9     04-09    TPro  6.5  /  Alb  4.0  /  TBili  0.3  /  DBili  x   /  AST  18  /  ALT  18  /  AlkPhos  74  04-09    CARDIAC MARKERS ( 07 Apr 2021 20:40 )  x     / <0.01 ng/mL / x     / x     / x              PT/INR - ( 07 Apr 2021 20:40 )   PT: 11.50 sec;   INR: 1.00 ratio         PTT - ( 07 Apr 2021 20:40 )  PTT:37.0 sec        MEDICATIONS  (STANDING):  chlorhexidine 4% Liquid 1 Application(s) Topical <User Schedule>  enoxaparin Injectable 40 milliGRAM(s) SubCutaneous daily  pantoprazole    Tablet 40 milliGRAM(s) Oral before breakfast    MEDICATIONS  (PRN):  acetaminophen   Tablet .. 650 milliGRAM(s) Oral every 6 hours PRN Mild Pain (1 - 3)  ibuprofen  Tablet. 400 milliGRAM(s) Oral three times a day PRN Mild Pain (1 - 3)  LORazepam   Injectable 1 milliGRAM(s) IV Push every 4 hours PRN seizure activity      Imaging Personally Reviewed:     [x ] YES  [ ] NO    Consultant(s) Notes Reviewed:  [x ] YES  [ ] NO    Care Discussed with Consultants/Other Providers [x ] YES  [ ] No medical contraindication for discharge
CC.  Seizure Like activity  HPI.  Patient reports that she feels better.  Offers no other complaints              Constitutional: No fever, fatigue or weight loss.  Skin: No rash.  Eyes: No recent vision problems or eye pain.  ENT: No congestion, ear pain, or sore throat.  Endocrine: No thyroid problems.  Cardiovascular: No chest pain or palpation.  Respiratory: No cough, shortness of breath, congestion, or wheezing.  Gastrointestinal: No abdominal pain, nausea, vomiting, or diarrhea.  Genitourinary: No dysuria.  Musculoskeletal: No joint swelling.  Neurologic: No headache.    Vital Signs Last 24 Hrs  T(C): 36.2 (10 Apr 2021 04:40), Max: 36.4 (09 Apr 2021 21:24)  T(F): 97.1 (10 Apr 2021 04:40), Max: 97.5 (09 Apr 2021 21:24)  HR: 65 (10 Apr 2021 04:40) (65 - 74)  BP: 113/56 (10 Apr 2021 04:40) (107/53 - 126/79)  BP(mean): --  RR: 16 (10 Apr 2021 04:40) (16 - 16)  SpO2: --        PHYSICAL EXAM-  GENERAL: NAD,    HEAD:  Atraumatic, Normocephalic  EYES: EOMI, PERRLA, conjunctiva and sclera clear  NECK: Supple, No JVD, Normal thyroid  NERVOUS SYSTEM:  Alert & Oriented X3, Moving all extremities  CHEST/LUNG: Clear to percussion bilaterally; No rales, rhonchi, wheezing, or rubs  HEART: Regular rate and rhythm; No murmurs, rubs, or gallops  ABDOMEN: Soft, Nontender, Nondistended; Bowel sounds present  EXTREMITIES:  2+ Peripheral Pulses, No clubbing, cyanosis, or edema  SKIN: No rashes or lesions                              11.9   4.72  )-----------( 251      ( 09 Apr 2021 06:44 )             36.4     04-09    139  |  105  |  9<L>  ----------------------------<  96  4.3   |  21  |  0.6<L>    Ca    9.3      09 Apr 2021 06:44  Phos  3.3     04-09  Mg     1.9     04-09    TPro  6.5  /  Alb  4.0  /  TBili  0.3  /  DBili  x   /  AST  18  /  ALT  18  /  AlkPhos  74  04-09                         MEDICATIONS  (STANDING):  chlorhexidine 4% Liquid 1 Application(s) Topical <User Schedule>  enoxaparin Injectable 40 milliGRAM(s) SubCutaneous daily  pantoprazole    Tablet 40 milliGRAM(s) Oral before breakfast    MEDICATIONS  (PRN):  acetaminophen   Tablet .. 650 milliGRAM(s) Oral every 6 hours PRN Mild Pain (1 - 3)  ibuprofen  Tablet. 400 milliGRAM(s) Oral three times a day PRN Mild Pain (1 - 3)  LORazepam   Injectable 1 milliGRAM(s) IV Push every 4 hours PRN seizure activity      Imaging Personally Reviewed:     [x ] YES  [ ] NO    Consultant(s) Notes Reviewed:  [x ] YES  [ ] NO    Care Discussed with Consultants/Other Providers [x ] YES  [ ] No medical contraindication for discharge
Epilepsy Attending Note:     JUNIORGENARO ROSENTHAL    40y Female  MRN [id number]    Vital Signs Last 24 Hrs  T(C): 36.1 (09 Apr 2021 04:57), Max: 37.4 (08 Apr 2021 19:44)  T(F): 96.9 (09 Apr 2021 04:57), Max: 99.3 (08 Apr 2021 19:44)  HR: 72 (09 Apr 2021 09:46) (71 - 94)  BP: 134/60 (09 Apr 2021 09:46) (120/65 - 141/84)  BP(mean): --  RR: 15 (09 Apr 2021 04:57) (15 - 18)  SpO2: 100% (08 Apr 2021 16:15) (100% - 100%)                          11.9   4.72  )-----------( 251      ( 09 Apr 2021 06:44 )             36.4       04-09    139  |  105  |  9<L>  ----------------------------<  96  4.3   |  21  |  0.6<L>    Ca    9.3      09 Apr 2021 06:44  Phos  3.3     04-09  Mg     1.9     04-09    TPro  6.5  /  Alb  4.0  /  TBili  0.3  /  DBili  x   /  AST  18  /  ALT  18  /  AlkPhos  74  04-09      MEDICATIONS  (STANDING):  chlorhexidine 4% Liquid 1 Application(s) Topical <User Schedule>  enoxaparin Injectable 40 milliGRAM(s) SubCutaneous daily  pantoprazole    Tablet 40 milliGRAM(s) Oral before breakfast    MEDICATIONS  (PRN):  acetaminophen   Tablet .. 650 milliGRAM(s) Oral every 6 hours PRN Mild Pain (1 - 3)  ibuprofen  Tablet. 400 milliGRAM(s) Oral three times a day PRN Mild Pain (1 - 3)  LORazepam   Injectable 1 milliGRAM(s) IV Push every 4 hours PRN seizure activity            VEEG in the last 24 hours:    Background----------symmetrical, well organized reactive and a PDR reaching 8-9 hz    Focal and generalized slowing---------  none    Interictal activity----------- none    Events------ during this session of the monitoring patient had few episodes/events that clinically could be characterized as rhythmic, intermittent  bobbing  movements of the head while responding.                   she also reported not being able to see during  that. None were associated with epileptiform EEG changes from the baseline    Seizures----------  none    Impression: none epileptic events as described above. Normal EEG    Plan - The results were discussed extensively with her . The concept of none epileptic events were also discussed            questions were answered          will DC the Keppra and continue the monitoring on no AED          seizure precaution

## 2021-04-10 NOTE — PROGRESS NOTE ADULT - ASSESSMENT
Patient is 41 yo Female with PMHx migraines, pulmonary embolism on oral contraceptives about 6-7 years ago, COVID in 1/2021, and last seen in clinic on 3/2021 questionable TIA vs hypotensive convulsion, presenting today to ED after being seen by her o/p neurologist for seizure-like episode with observable left sided lip twitching and b/l UE and LE weakness, greater on the Left.     Seizure-like episode, recurrent:  - MRI head 3/21/21 showed white matter changes in right suprasylvian subcortical and periatrial regions suspicious for small vessel change vs demyelinating disease.   - CT head/ neck on admission no acute intracranial pathology  - VEEG shows no seizure activity  -Taper off keppra as per neurology  -Continue with VEEG  -Further care as per neurology    #Progress Note Handoff  Pending (specify):  further care as per neurology  Family discussion:  plan of care was discussed with patient   in details.  all questions were answered.  seems to understand, and in agreement  Disposition:  unknown        
Patient is 41 yo Female with PMHx migraines, pulmonary embolism on oral contraceptives about 6-7 years ago, COVID in 1/2021, and last seen in clinic on 3/2021 questionable TIA vs hypotensive convulsion, presenting today to ED after being seen by her o/p neurologist for seizure-like episode with observable left sided lip twitching and b/l UE and LE weakness, greater on the Left.     Seizure-like episode, recurrent:  - MRI head 3/21/21 showed white matter changes in right suprasylvian subcortical and periatrial regions suspicious for small vessel change vs demyelinating disease.   - CT head/ neck on admission no acute intracranial pathology  - VEEG shows no seizure activity  -Taper off keppra as per neurology  -Continue with VEEG  -Further care as per neurology    #Progress Note Handoff  Pending (specify):  further care as per neurology  Family discussion:  plan of care was discussed with patient   in details.  all questions were answered.  seems to understand, and in agreement  Disposition:  unknown

## 2021-04-10 NOTE — DISCHARGE NOTE PROVIDER - CARE PROVIDER_API CALL
Zaid Gupta   INTERNAL MEDICINE  [address] [place] [address]  Phone: [phone]  Fax: [phone]  Follow Up Time:    Zaid Gupta   INTERNAL MEDICINE  [address] [place] [address]  Phone: [phone]  Fax: [phone]  Follow Up Time:     Jonny Pineda)  EEGEpilepsy; Neurology  [address] [place] [address], [address]  [place] [address]  Phone: [phone]  Fax: [phone]  Follow Up Time: 1 week

## 2021-04-10 NOTE — DISCHARGE NOTE NURSING/CASE MANAGEMENT/SOCIAL WORK - PATIENT PORTAL LINK FT
You can access the FollowMyHealth Patient Portal offered by [facility] by registering at the following website: [email or web address]. By joining [facility]’s FollowMyHealth portal, you will also be able to view your health information using other applications (apps) compatible with our system.

## 2021-04-11 NOTE — EEG REPORT - NS EEG TEXT BOX
VIDEO EEG FINAL REPORT      GENARO PACHECO    40y Female  MRN [id number]      Recording Technique: The patient underwent continuous video-EEG monitoring using Telemetry System hardware on the Shots/VideoGenie System. EEG and video data were stored on a computer hard drive with important events saved in digital archive files. The material was reviewed by a physician (electroencephalographer/epileptologist) on a daily basis. Tobias and seizure detection algorithms were utilized if needed. An EEG technician attended to the patient for 8 to 10 hours per day. The patient was observed by the epilepsy nursing staff 24 hrs per day. The epilepsy center neurologist was available in person or on call 24 hours per day.    Placement and Labeling of Eelectrodes: The EEG was performed using at least 20 channel referential EEG connections (coronal over temporal over parasaggital montage) with inferior temporal electrodes when indicting and using all standard 10-20 electrode placement with EKG, with additional electrodes placed in the inferior temporal region using the modified 10-10 montage electrode placements for elective admissions, or if deemed necessary. Recording was at a sampling rate of 256 samples per second per channel. Time syncronized digital video recording was done simultaneously with EEG recording. A low light infrared camera was used for low light recording.      HPI:  Patient is 41 yo Female RH dominant, with PMHx migraines, pulmonary embolism on oral contraceptives about 6-7 years ago (not currently on AC), diverticulitis, and COVID in 1/2021, and last seen in neurology clinic on day of admission for seizure-like episode with observable left sided lip twitching and b/l UE and LE weakness, greater on the Left. She was slightly dysarthric in clinic but A&Ox3, conversant and following commands. The day prior, ptn presented to ED with similar seizure-like activity and discharged from ED same day. In clinic vital signs were stable and after 10 minutes, symptoms resolved and strength in extremities improved. Patient did not appear to require ED however after leaving symptoms returned so patient presented to ED.    In the ED HR: 94  BP: 129/72 RR: 18  SpO2: 100%. Labs unremarkable. O/p 24 EEG pending read. Ptn admitted to medicine for neurology workup of seizure-like activity (08 Apr 2021 00:24)      MEDICATIONS  (STANDING):    MEDICATIONS  (PRN):        AWAKE  The recording during the wakefulness consists of a symmetrical and well-organized background and posterior dominant rhythm in the range of 8-9 Hz, which is reactive to eye opening and eye closure and change in the level of alertness.      ASLEEP  Different stages of sleep were preserved well. Stage II of non-REM sleep was characterized by the presence of symmetrical and well-defined sleep spindles and vertex sharp waves. The deeper stages of sleep were identified by the presence of low theta and delta range activity seen diffusely over both hemispheres and more prominently from the frontal and central derivations. All stages captured and symmetric.      GENERALIZED SLOWING  None    FOCAL SLOWING  None    BREACH ARTIFACT      ACTIVATION PROCEDURES  None      EPILEPTIFORM ACTIVITY        None    EVENTS/SEIZURES  Head shaking events with no EEG correlate except muscle artifact.     IMPRESSION  Normal VEEG     CLINICAL CORRELATION  A normal VEEG does not exclude the diagnosis of epilepsy. Clinical correlation recommended.

## 2021-04-12 PROBLEM — Z87.898 HISTORY OF SEIZURE: Status: ACTIVE | Noted: 2021-03-15

## 2021-04-15 DIAGNOSIS — Z88.0 ALLERGY STATUS TO PENICILLIN: ICD-10-CM

## 2021-04-15 DIAGNOSIS — Z86.16 PERSONAL HISTORY OF COVID-19: ICD-10-CM

## 2021-04-15 DIAGNOSIS — R42 DIZZINESS AND GIDDINESS: ICD-10-CM

## 2021-04-15 DIAGNOSIS — G51.32 CLONIC HEMIFACIAL SPASM, LEFT: ICD-10-CM

## 2021-04-15 DIAGNOSIS — G43.909 MIGRAINE, UNSPECIFIED, NOT INTRACTABLE, WITHOUT STATUS MIGRAINOSUS: ICD-10-CM

## 2021-04-15 DIAGNOSIS — Z79.899 OTHER LONG TERM (CURRENT) DRUG THERAPY: ICD-10-CM

## 2021-04-15 DIAGNOSIS — Z86.711 PERSONAL HISTORY OF PULMONARY EMBOLISM: ICD-10-CM

## 2021-04-15 LAB — LEVETIRACETAM SERPL-MCNC: 17.7 UG/ML — SIGNIFICANT CHANGE UP (ref 10–40)

## 2021-04-26 ENCOUNTER — APPOINTMENT (OUTPATIENT)
Dept: NEUROLOGY | Facility: CLINIC | Age: 41
End: 2021-04-26
Payer: MEDICAID

## 2021-04-26 VITALS
HEART RATE: 71 BPM | DIASTOLIC BLOOD PRESSURE: 80 MMHG | BODY MASS INDEX: 33.59 KG/M2 | HEIGHT: 66 IN | TEMPERATURE: 97.9 F | WEIGHT: 209 LBS | OXYGEN SATURATION: 100 % | SYSTOLIC BLOOD PRESSURE: 118 MMHG

## 2021-04-26 PROCEDURE — 99214 OFFICE O/P EST MOD 30 MIN: CPT

## 2021-04-26 PROCEDURE — 99072 ADDL SUPL MATRL&STAF TM PHE: CPT

## 2021-04-26 RX ORDER — LEVETIRACETAM 500 MG/1
500 TABLET, FILM COATED ORAL TWICE DAILY
Qty: 60 | Refills: 0 | Status: DISCONTINUED | COMMUNITY
Start: 2021-04-07 | End: 2021-04-26

## 2021-04-26 NOTE — END OF VISIT
[FreeTextEntry3] : Patient seen and examined with JOSE Doyle and agree with above except as noted.  Patient had episodes during the visit of head turning to the left and making repeated guttural sounds.  She says these started since the hospital admission for the VEEG.  I looked for results from the encephalopathy panel but was unable to find them and called the lab and they were not found and said to have been cancelled.  She continues to get episodes frequently and avoids having her children see them.   says she is having them frequently and the migraines are bad that sometimes she will sleep most of the day.  While there maybe an underlying psychiatric diagnosis will work her up for an autoimmune encephlaopathy first given the sudden change.  Also due too her migraines will start her on topamax.\par \par Plan as above

## 2021-04-26 NOTE — HISTORY OF PRESENT ILLNESS
[FreeTextEntry1] : Patient is 39 yo Female RH dominant, with PMHx migraines, pulmonary embolism on oral contraceptives about 6-7 years ago, COVID in 1/2021, and last seen in clinic on 4/7/21 for HFU after admission for flashing lights, tearing of eyes, twitching with L facial droop, aphasia, and L-sided weakness.  After the clinic visit, patient was hospitalized on the same day for the aforementioned complaints had had full inpatient seizure workup that was negative. At the hospital, patient had involuntary movements, but no epileptiform activity was seen on EEG.  MRI head 3/21/21 showed white matter changes in right suprasylvian subcortical and periatrial regions suspicious for small vessel change vs demyelinating disease. \par \par Today patient presents to clinic with  with c/o tics that even occur in front of her children.  She is not taking on any medications. ENS panel ordered inpatient were cancelled.  During the visit, patient was having ticks and involuntary movements.    \par

## 2021-04-26 NOTE — PHYSICAL EXAM
[FreeTextEntry1] : Focal neurological exam:\par \par MS: Awake, alert, oriented to person, place, situation and time. Normal affect. Follows commands. \par \par Language: Speech is clear, fluent with good repetition & comprehension. No dysarthria. \par \par CNs 2 - 12 intact. EOMI no nystagmus, no diplopia. No facial asymmetry b/l, full eye closure strength b/l. Hearing grossly normal. Head turning & shoulder shrug intact b/l. Tongue midline, normal movements, no atrophy.\par \par Motor: Normal muscle bulk & tone. Observable tics, abnormal, involuntary vocal sounds and movements.  No pronator drift. Muscle strength of b/l UE and b/l LE 5/5. \par \par Reflexes: DTR of biceps, knee and ankle normal \par \par Sensation: Intact to LT, pin-prick, and vibration b/l throughout\par \par Cortical: No extinction\par \par Coordination: Intact rapid-alternating movements. No dysmetria to FTN.\par \par Gait: No postural instability. Normal stance and tandem gait.\par \par \par \par \par

## 2021-04-26 NOTE — DISCUSSION/SUMMARY
[FreeTextEntry1] : Patient is 39 yo Female RH dominant, with PMHx migraines, pulmonary embolism on oral contraceptives about 6-7 years ago, COVID in 1/2021, and recently hospitalized on 4/7/21 for twitching with L facial droop, aphasia, and L-sided weakness, with complete inpatient seizure work up that was negative. MRI head 3/21/21 showed white matter changes in right suprasylvian subcortical and periatrial regions suspicious for small vessel change vs demyelinating disease, but is likely due to migraines. Today, patient presents with c/o tics, that even occur in front of her children. In clinic today, these tics were visable.  \par \par PLAN:\par -Place on Topamax 25 BID for possible complex migraine\par -Repeat ENS panel as the inpatient set was cancelled\par -MRI head w/ RENETTA to use out CNS abnormality, possible demyelinating disease \par -F/u in clinic once lab work and imaging is complete

## 2021-04-26 NOTE — REASON FOR VISIT
[Follow-Up: _____] : a [unfilled] follow-up visit [Spouse] : spouse [FreeTextEntry1] : for involuntary movements

## 2021-05-14 ENCOUNTER — NON-APPOINTMENT (OUTPATIENT)
Age: 41
End: 2021-05-14

## 2021-05-21 ENCOUNTER — NON-APPOINTMENT (OUTPATIENT)
Age: 41
End: 2021-05-21

## 2021-05-21 NOTE — REASON FOR VISIT
[Follow-Up: _____] : a [unfilled] follow-up visit [Spouse] : spouse [Family Member] : family member [FreeTextEntry1] : for seizure-like episodes and recent hospital visit in 4/2021

## 2021-05-21 NOTE — PHYSICAL EXAM
[FreeTextEntry1] : Focal neurological exam:\par \par MS: Awake, alert, oriented to person, place, situation and time. Follows commands. Some b/l eye tearing. \par \par Language: Speech is mildly dysarthric, good comprehension. Some language barrier. \par \par CNs 2 - 12 intact. EOMI no nystagmus, no diplopia. Left sided lip twitch, but no asymmetry appreciated. Difficulty keeps eyes open during episode, b/l eye tearing. Hearing grossly normal. Tongue midline.\par \par Motor: Normal muscle bulk & tone. Seizure noticeable through left facial twitch that lasted about 10 minutes. During episode, muscle strength weakness to 4/5 in all extremities and drift of LLE.  After episode, patient lethargic, but no drift in b/l UE.  \par \par Reflexes: DTR of biceps, knee normal \par \par Sensation: Diminished to LT on the left.  \par Cortical: No extinction\par \par Coordination: No dysmetria to FTN.\par \par Gait: Walking with assistance due to vertigo. \par \par \par \par \par \par \par

## 2021-05-21 NOTE — DISCUSSION/SUMMARY
[FreeTextEntry1] : Patient is 41 yo Female with PMHx migraines, pulmonary embolism on oral contraceptives about 6-7 years ago, COVID in 1/2021, and last seen in clinic on 3/2021 for episode of blacking out, questionable TIA vs hypotensive convulsion. She presents to clinic today for post hospitalization visit a few days ago 4/4/2021 for c/o flashing lights, tearing of eyes, twitching with L facial droop, aphasia, and L-sided weakness. CTH was negative for infarct or hemorrhage.  CTA was negative for LVO, CTP no mismatch.  Facial twitching was observed.  Patient was thought to have on-going seizure. She was loaded with Ativan and Keppra, and patient left AMA.  In clinic today, patient appeared to have another seizure-like episode with observable left sided lip twitching and b/l UE and LE weakness, greater on the Left.  She was slightly dysarthric but A&Ox3, conversant and following commands.  Vital signs were stable and after 10 minutes, symptoms resolved and strength in extremities improved.  Patient did not appear to require ED visit. Case with discussed with Dr. Tafoya.  We did not feel it was necessary to repeat brain imaging or admit patient to the hospital.  We decided to place patient on Keppra 500 BID for seizure and have her obtain vEEG.  MRI head 3/21/21 showed white matter changes in right suprasylvian subcortical and periatrial regions suspicious for small vessel change vs demyelinating disease.  MRI Neck was negative, only showing minimal spondylitic changes. 24 hr aEEG was completed yesterday and not read yet.  Patient states that she completed 30 day event monitor, will follow up report. \par \par PLAN:\par -Obtain vEEG\par -Start Keppra 500 BID\par -Instructed patient to call 911 or report to ED if any acute neurological changes occur\par -Follow up in clinic PRN and after vEEG completed as soon as possible\par -F/u 30 day even monitor report as well as 24hr a EEG report \par \par \par \par \par \par \par \par \par \par \par PLAN:  \par \par -500 BID Keppra \par -Video EEG

## 2021-05-21 NOTE — DATA REVIEWED
[de-identified] : MRI head 3/21/21 showed white matter changes in right suprasylvian subcortical and periatrial regions suspicious for small vessel change vs demyelinating disease.  MRI Neck was negative, only showing minimal spondylitic changes.

## 2021-05-21 NOTE — HISTORY OF PRESENT ILLNESS
[FreeTextEntry1] : Patient is 41 yo Female RH dominant, with PMHx migraines, pulmonary embolism on oral contraceptives about 6-7 years ago, COVID in 1/2021, and last seen in clinic on 3/2021 for episode of blacking out, questionable TIA vs hypotensive convulsion. At that time, she admitted to spinal stenosis of her lower back.  Physical exam was negative for left sided weakness, but had crossed sensory recognition, vibration stronger on the left and temperature better on right. Brisker DTR on the Left.  From the last clinic visit, we recommended MRI noncontrast of Head/Neck, 24hr aEEG, discontinue ASA, and f/u event monitor.\par \par She presents to clinic today for post hospitalization visit a few days ago 4/4/2021 for c/o flashing lights, tearing of eyes, twitching with L facial droop, aphasia, and L-sided weakness. Stroke code was called and NIHSS 5 for LUE drift, inability to move LLE against gravity, and mild to moderate sensory loss. CTH was negative for infarct or hemorrhage.  CTA was negative for LVO, CTP no mismatch.  Per inpatient attending attestation, facial twitching was observed on video.  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  She was loaded with Ativan and Keppra.  Patient left AMA.  \par \par In clinic today, patient appeared to have another seizure-like episode with observable left sided lip twitching and b/l UE and LE weakness, greater on the Left.  She was slightly dysarthric but A&Ox3, conversant and following commands.  Vital signs were stable and after 10 minutes, symptoms resolved and strength in extremities improved.  Patient did not appear to require ED visit.    \par \par MRI head 3/21/21 showed white matter changes in right suprasylvian subcortical and periatrial regions suspicious for small vessel change vs demyelinating disease.  MRI Neck was negative, only showing minimal spondylitic changes. 24 hr aEEG was completed yesterday and not read yet.  Patient states that she completed 30 day event monitor, will follow up report. \par

## 2021-10-01 ENCOUNTER — APPOINTMENT (OUTPATIENT)
Dept: NEUROLOGY | Facility: CLINIC | Age: 41
End: 2021-10-01
Payer: MEDICAID

## 2021-10-01 VITALS
BODY MASS INDEX: 35.52 KG/M2 | TEMPERATURE: 98.2 F | SYSTOLIC BLOOD PRESSURE: 116 MMHG | HEIGHT: 66 IN | WEIGHT: 221 LBS | OXYGEN SATURATION: 98 % | DIASTOLIC BLOOD PRESSURE: 80 MMHG | HEART RATE: 80 BPM

## 2021-10-01 PROCEDURE — 99214 OFFICE O/P EST MOD 30 MIN: CPT

## 2021-10-01 RX ORDER — TOPIRAMATE 25 MG/1
25 TABLET, FILM COATED ORAL
Qty: 60 | Refills: 3 | Status: DISCONTINUED | COMMUNITY
Start: 2021-04-26 | End: 2021-10-01

## 2021-10-01 NOTE — HISTORY OF PRESENT ILLNESS
[FreeTextEntry1] : Patient is 41 yo Female RH dominant, with PMHx migraines, pulmonary embolism on oral contraceptives about 6-7 years ago, COVID in 1/2021, and last seen in clinic on 4/7/21 for HFU after admission for flashing lights, tearing of eyes, twitching with L facial droop, aphasia, and L-sided weakness.  After the clinic visit, patient was hospitalized on the same day for the aforementioned complaints had had full inpatient seizure workup that was negative. At the hospital, patient had involuntary movements, but no epileptiform activity was seen on EEG.  MRI head 3/21/21 showed white matter changes in right suprasylvian subcortical and periatrial regions suspicious for small vessel change vs demyelinating disease. \par \par Today patient presents to clinic with her daughter and says she continues to get issues on her left side.  She also will get random jerking of her head to the left.  She has one of these episodes when I am checking her eye exam.  Sometimes her arm and leg will jerk out to the side.  She is also getting migraines frequently and stopped taking the topamax because it was making her confused and "out of it".  She is also c/o lower back pain and has been doing PT and says yesterday the PT told her that her left side was very bad and also she likely has inflammation in her back.

## 2021-10-01 NOTE — DISCUSSION/SUMMARY
[FreeTextEntry1] : Patient is 41 yo Female RH dominant, with PMHx migraines, pulmonary embolism on oral contraceptives about 6-7 years ago, COVID in 1/2021, and recently hospitalized on 4/7/21 for twitching with L facial droop, aphasia, and L-sided weakness, with complete inpatient seizure work up that was negative. MRI head 3/21/21 showed white matter changes in right suprasylvian subcortical and periatrial regions suspicious for small vessel change vs demyelinating disease, but is likely due to migraines. Today, patient presents with c/o tics, that even occur in front of her children as well as episodes of left sided weakness that continue to occur.  I discussed the possibility of post covid neuropsychiatric diagnosis similar to PANDAS in children post streptococcal infection.  She did not seem to understand this and I am not sure whether this entity exists.  She relates all her complaints to beginning after she developed covid infection.\par \par PLAN:\par 1. Aimovig 70mg Qmonthly for migraines\par 2. Mri Brain w/o RENETTA (repeat)\par 3. MRI lumbar spine w/o RENETTA\par 4. f/u in 2 months\par \par

## 2021-10-07 LAB — GAD65 AB SER-MCNC: 0.03 NMOL/L

## 2021-10-26 ENCOUNTER — OUTPATIENT (OUTPATIENT)
Dept: OUTPATIENT SERVICES | Facility: HOSPITAL | Age: 41
LOS: 1 days | Discharge: HOME | End: 2021-10-26
Payer: MEDICAID

## 2021-10-26 ENCOUNTER — RESULT REVIEW (OUTPATIENT)
Age: 41
End: 2021-10-26

## 2021-10-26 DIAGNOSIS — Z90.49 ACQUIRED ABSENCE OF OTHER SPECIFIED PARTS OF DIGESTIVE TRACT: Chronic | ICD-10-CM

## 2021-10-26 DIAGNOSIS — Z98.891 HISTORY OF UTERINE SCAR FROM PREVIOUS SURGERY: Chronic | ICD-10-CM

## 2021-10-26 DIAGNOSIS — G43.909 MIGRAINE, UNSPECIFIED, NOT INTRACTABLE, WITHOUT STATUS MIGRAINOSUS: ICD-10-CM

## 2021-10-26 PROCEDURE — 70551 MRI BRAIN STEM W/O DYE: CPT | Mod: 26

## 2021-11-08 ENCOUNTER — RX RENEWAL (OUTPATIENT)
Age: 41
End: 2021-11-08

## 2021-11-16 NOTE — ED ADULT TRIAGE NOTE - PAIN: PRESENCE, MLM
Incoming call from patient, she states,  She is short of breath, has severe weakness, especially to her arms and legs, dryness to her mouth, throat is hoarse, dizziness, headache.      States, has had these symptoms for a few weeks but has been getting worse. States, has been very sleepy and weak.  She states, she did call her cardiologist, she states, she thinks these symptoms are from her carvedilol and torsemide.  Patient states, she gets very short of breath with any exertion.    Denies swelling of extremities, chest pain    Hx CHF, Pulmonary htn, Arterial ischemic stroke, PE    Patient states, her daughter is home and she will take her to the ED at [facility].  Patient states, she will call her cardiologist again.  Advised due to sob and severe weakness she should go to ED and this time.        Reason for Disposition  • Difficulty breathing    Protocols used: WEAKNESS (GENERALIZED) AND FATIGUE-A-AH       denies pain/discomfort

## 2021-12-08 ENCOUNTER — APPOINTMENT (OUTPATIENT)
Dept: NEUROLOGY | Facility: CLINIC | Age: 41
End: 2021-12-08
Payer: MEDICAID

## 2021-12-08 VITALS
HEART RATE: 76 BPM | SYSTOLIC BLOOD PRESSURE: 120 MMHG | WEIGHT: 214 LBS | HEIGHT: 66 IN | TEMPERATURE: 98.1 F | DIASTOLIC BLOOD PRESSURE: 81 MMHG | OXYGEN SATURATION: 98 % | BODY MASS INDEX: 34.39 KG/M2

## 2021-12-08 DIAGNOSIS — R25.9 UNSPECIFIED ABNORMAL INVOLUNTARY MOVEMENTS: ICD-10-CM

## 2021-12-08 PROCEDURE — 99214 OFFICE O/P EST MOD 30 MIN: CPT

## 2021-12-08 NOTE — PHYSICAL EXAM
[FreeTextEntry1] : Focal neurological exam:\par \par MS: Awake, alert, oriented to person, place, situation and time. Normal affect. Follows commands. \par \par Language: Speech is clear, fluent with good repetition & comprehension. No dysarthria. \par \par CNs 2 - 12 intact. EOMI no nystagmus, no diplopia. No facial asymmetry b/l, full eye closure strength b/l. Hearing grossly normal. Head turning & shoulder shrug intact b/l. Tongue midline, normal movements, no atrophy.\par \par Motor: Normal muscle bulk & tone. No tics, tremors, seizures. No pronator drift. Muscle strength of b/l UE 5/5, LLE 5-/5 and RLE 5/5.  \par \par Reflexes: DTR of biceps 2+, knees 2+  \par \par Sensation: Intact to LT throughout. Increased to vibration for LUE and LLE, and increased to temp of LUE. \par \par Cortical: No extinction\par \par Coordination: No dysmetria to FTN.\par \par Gait: No postural instability. Normal stance and tandem gait.

## 2021-12-08 NOTE — REASON FOR VISIT
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

## 2021-12-08 NOTE — ASSESSMENT
[FreeTextEntry1] : Patient is 41 yo RH woman with PMHx of migraines, pulmonary embolism on oral contraceptives about 6-7 years ago, COVID in 1/2021, and hospitalized on 4/7/21 for twitching with L facial droop, aphasia, and L-sided weakness with extensive seizure and encephalitis work up that has been negative and thought to have possible neuropsychological sequelae from COVID infection . She had MRI head 3/21/21 that showed some signal abnormalities, but today, repeat MRI H on 10/27/21 was unremarkable with no signal abnormalities. Repeat Glutamic acid decarboxylase was 0.03, unchanged from previous values. Today HAs are only mild and tolerable not on any medication. B/l UE are symmetric in strength. She has mild LLE weakness and hyperesthesia to vib/temp of left side, and is getting PT for left sided weakness. The tics have diminished, not on any medication. Overall, her symptoms and MRI have improved. \par \par PLAN: \par -X-ray of L-spine and lumbosacral as MRI L-spine was denied\par -C/w PT\par -Requesting to f/u with Dr. Jean Pretty next visit

## 2021-12-08 NOTE — HISTORY OF PRESENT ILLNESS
[FreeTextEntry1] : Patient is 41 yo RH woman with PMHx of migraines, pulmonary embolism on oral contraceptives about 6-7 years ago, COVID in 1/2021, and hospitalized on 4/7/21 for twitching with L facial droop, aphasia, and L-sided weakness, with complete inpatient seizure work up that was negative. MRI head 3/21/21 showed white matter changes in right suprasylvian subcortical and periatrial regions suspicious for small vessel change vs demyelinating disease, but is likely due to migraines. ENS2 panel was negative. She has had c/o of tics, that even occur in front of her children as well as episodes of left sided weakness that continue to occur. She was last seen by Dr. Jean Pretty who discussed the possibility of post covid neuropsychiatric diagnosis similar to PANDAS in children post streptococcal infection. She did endorse all her complaints began after she developed COVID infection. \par \par Repeat MRI H on 10/27/21 was unremarkable with no signal abnormalities. Glutamic acid decarboxylase was 0.03, unchanged from previous values. Not taking Aimovig, getting only mild tolerable HAs. Left sided weakness is improved. The tics have diminished. Insurance denies MRI L-spine. Started PT for Left side today. She never took the divalproex sodium. \par \par

## 2021-12-18 ENCOUNTER — EMERGENCY (EMERGENCY)
Facility: HOSPITAL | Age: 41
LOS: 0 days | Discharge: HOME | End: 2021-12-18
Attending: EMERGENCY MEDICINE | Admitting: EMERGENCY MEDICINE
Payer: MEDICAID

## 2021-12-18 VITALS
HEART RATE: 69 BPM | WEIGHT: 205.91 LBS | TEMPERATURE: 97 F | DIASTOLIC BLOOD PRESSURE: 76 MMHG | HEIGHT: 66 IN | OXYGEN SATURATION: 100 % | RESPIRATION RATE: 16 BRPM | SYSTOLIC BLOOD PRESSURE: 145 MMHG

## 2021-12-18 DIAGNOSIS — Z98.891 HISTORY OF UTERINE SCAR FROM PREVIOUS SURGERY: Chronic | ICD-10-CM

## 2021-12-18 DIAGNOSIS — Z90.49 ACQUIRED ABSENCE OF OTHER SPECIFIED PARTS OF DIGESTIVE TRACT: Chronic | ICD-10-CM

## 2021-12-18 DIAGNOSIS — Z20.822 CONTACT WITH AND (SUSPECTED) EXPOSURE TO COVID-19: ICD-10-CM

## 2021-12-18 DIAGNOSIS — R07.9 CHEST PAIN, UNSPECIFIED: ICD-10-CM

## 2021-12-18 DIAGNOSIS — Z86.16 PERSONAL HISTORY OF COVID-19: ICD-10-CM

## 2021-12-18 DIAGNOSIS — R00.2 PALPITATIONS: ICD-10-CM

## 2021-12-18 DIAGNOSIS — R07.89 OTHER CHEST PAIN: ICD-10-CM

## 2021-12-18 DIAGNOSIS — Z88.0 ALLERGY STATUS TO PENICILLIN: ICD-10-CM

## 2021-12-18 LAB
ALBUMIN SERPL ELPH-MCNC: 4.4 G/DL — SIGNIFICANT CHANGE UP (ref 3.5–5.2)
ALP SERPL-CCNC: 67 U/L — SIGNIFICANT CHANGE UP (ref 30–115)
ALT FLD-CCNC: 12 U/L — SIGNIFICANT CHANGE UP (ref 0–41)
ANION GAP SERPL CALC-SCNC: 16 MMOL/L — HIGH (ref 7–14)
AST SERPL-CCNC: 17 U/L — SIGNIFICANT CHANGE UP (ref 0–41)
BASOPHILS # BLD AUTO: 0.03 K/UL — SIGNIFICANT CHANGE UP (ref 0–0.2)
BASOPHILS NFR BLD AUTO: 0.8 % — SIGNIFICANT CHANGE UP (ref 0–1)
BILIRUB SERPL-MCNC: 0.3 MG/DL — SIGNIFICANT CHANGE UP (ref 0.2–1.2)
BUN SERPL-MCNC: 11 MG/DL — SIGNIFICANT CHANGE UP (ref 10–20)
CALCIUM SERPL-MCNC: 9.5 MG/DL — SIGNIFICANT CHANGE UP (ref 8.5–10.1)
CHLORIDE SERPL-SCNC: 105 MMOL/L — SIGNIFICANT CHANGE UP (ref 98–110)
CO2 SERPL-SCNC: 21 MMOL/L — SIGNIFICANT CHANGE UP (ref 17–32)
CREAT SERPL-MCNC: 0.6 MG/DL — LOW (ref 0.7–1.5)
EOSINOPHIL # BLD AUTO: 0.11 K/UL — SIGNIFICANT CHANGE UP (ref 0–0.7)
EOSINOPHIL NFR BLD AUTO: 3 % — SIGNIFICANT CHANGE UP (ref 0–8)
GLUCOSE SERPL-MCNC: 80 MG/DL — SIGNIFICANT CHANGE UP (ref 70–99)
HCG SERPL QL: NEGATIVE — SIGNIFICANT CHANGE UP
HCT VFR BLD CALC: 37.9 % — SIGNIFICANT CHANGE UP (ref 37–47)
HGB BLD-MCNC: 12 G/DL — SIGNIFICANT CHANGE UP (ref 12–16)
IMM GRANULOCYTES NFR BLD AUTO: 0.3 % — SIGNIFICANT CHANGE UP (ref 0.1–0.3)
LYMPHOCYTES # BLD AUTO: 1.59 K/UL — SIGNIFICANT CHANGE UP (ref 1.2–3.4)
LYMPHOCYTES # BLD AUTO: 43.8 % — SIGNIFICANT CHANGE UP (ref 20.5–51.1)
MCHC RBC-ENTMCNC: 29.1 PG — SIGNIFICANT CHANGE UP (ref 27–31)
MCHC RBC-ENTMCNC: 31.7 G/DL — LOW (ref 32–37)
MCV RBC AUTO: 91.8 FL — SIGNIFICANT CHANGE UP (ref 81–99)
MONOCYTES # BLD AUTO: 0.35 K/UL — SIGNIFICANT CHANGE UP (ref 0.1–0.6)
MONOCYTES NFR BLD AUTO: 9.6 % — HIGH (ref 1.7–9.3)
NEUTROPHILS # BLD AUTO: 1.54 K/UL — SIGNIFICANT CHANGE UP (ref 1.4–6.5)
NEUTROPHILS NFR BLD AUTO: 42.5 % — SIGNIFICANT CHANGE UP (ref 42.2–75.2)
NRBC # BLD: 0 /100 WBCS — SIGNIFICANT CHANGE UP (ref 0–0)
NT-PROBNP SERPL-SCNC: 12 PG/ML — SIGNIFICANT CHANGE UP (ref 0–300)
PLATELET # BLD AUTO: 231 K/UL — SIGNIFICANT CHANGE UP (ref 130–400)
POTASSIUM SERPL-MCNC: 4.4 MMOL/L — SIGNIFICANT CHANGE UP (ref 3.5–5)
POTASSIUM SERPL-SCNC: 4.4 MMOL/L — SIGNIFICANT CHANGE UP (ref 3.5–5)
PROT SERPL-MCNC: 6.8 G/DL — SIGNIFICANT CHANGE UP (ref 6–8)
RBC # BLD: 4.13 M/UL — LOW (ref 4.2–5.4)
RBC # FLD: 12.8 % — SIGNIFICANT CHANGE UP (ref 11.5–14.5)
SARS-COV-2 RNA SPEC QL NAA+PROBE: SIGNIFICANT CHANGE UP
SODIUM SERPL-SCNC: 142 MMOL/L — SIGNIFICANT CHANGE UP (ref 135–146)
TROPONIN T SERPL-MCNC: <0.01 NG/ML — SIGNIFICANT CHANGE UP
WBC # BLD: 3.63 K/UL — LOW (ref 4.8–10.8)
WBC # FLD AUTO: 3.63 K/UL — LOW (ref 4.8–10.8)

## 2021-12-18 PROCEDURE — 99285 EMERGENCY DEPT VISIT HI MDM: CPT

## 2021-12-18 PROCEDURE — 71046 X-RAY EXAM CHEST 2 VIEWS: CPT | Mod: 26

## 2021-12-18 PROCEDURE — 93010 ELECTROCARDIOGRAM REPORT: CPT

## 2021-12-18 NOTE — ED PROVIDER NOTE - CARE PROVIDER_API CALL
Bubba Yeager)  Cardiovascular Disease; Interventional Cardiology  [address] [place] [address]  Phone: [phone]  Fax: [phone]  Follow Up Time: 1-3 Days

## 2021-12-18 NOTE — ED PROVIDER NOTE - OBJECTIVE STATEMENT
42 yo female, PMHx of covid, migraines, PE, seizures, presents with chest pressure, x2 days, intermittent, no aggravating or alleviating factors, associated with generalized headache and intermittent palpitations. Follows Dr. Griffiths, last seen 3 months ago, scheduled to see him in 1 week. Denies fevers, chills, cough, nausea, vomiting, dizziness, abdominal pain, shortness of breath. 40 yo female, PMHx of covid, migraines, PE, seizures, presents with chest pressure, x2 days, intermittent, no aggravating or alleviating factors, associated with generalized headache and intermittent palpitations. Follows Dr. Griffiths, last seen 3 months ago, scheduled to see him in 1 week. Denies fevers, chills, cough, nausea, vomiting, dizziness, abdominal pain, shortness of breath. Father had MI at age 42.

## 2021-12-18 NOTE — ED PROVIDER NOTE - ATTENDING CONTRIBUTION TO CARE
41 year old female, pmhx as documented presenting with 2 days of chest pain described as tight, substernal, non-radiating, no palliative or provocative factors, mild severity. States pain has been constant since onset. Otherwise denies fevers, cough, dyspnea, N/V/D, blood in stool, urinary symptoms or leg swelling.    Vital Signs: I have reviewed the initial vital signs.  Constitutional: NAD, well-nourished, appears stated age, no acute distress.  HEENT: Airway patent, moist MM, no erythema/swelling/deformity of oral structures. EOMI, PERRLA.  CV: regular rate, regular rhythm, well-perfused extremities, 2+ b/l DP and radial pulses equal.  Lungs: BCTA, no increased WOB.  ABD: NTND, no guarding or rebound, no pulsatile mass, no hernias.   MSK: Neck supple, nontender, nl ROM, no stepoff. Chest nontender. Back nontender in TLS spine or to b/l bony structures or flanks. Ext nontender, nl rom, no deformity.   INTEG: Skin warm, dry, no rash.  NEURO: A&Ox3, normal strength, nl sensation throughout, normal speech.   PSYCH: Calm, cooperative, normal affect and interaction.    Will obtain EKG, CXR, labs, re-eval.

## 2021-12-18 NOTE — ED PROVIDER NOTE - NS ED ROS FT
GEN:  no fever, no chills, no generalized weakness  NEURO:  +headache, no dizziness  ENT: no sore throat, no runny nose  CV:  +chest pain, +palpitations  RESP:  no sob, no cough  GI:  no nausea, no vomiting, no abdominal pain  :  no dysuria  MSK:  no joint pain, no edema

## 2021-12-18 NOTE — ED PROVIDER NOTE - PHYSICAL EXAMINATION
CONSTITUTIONAL: Well-developed; well-nourished; in no acute distress.   SKIN: warm, dry  HEAD: Normocephalic  ENT: No nasal discharge  NECK: Supple  CARD: S1, S2 normal; no murmurs, gallops, or rubs. Regular rate and rhythm.   RESP: No wheezes, rales or rhonchi.  ABD: soft ntnd. BS+ in all 4 quadrants.  EXT: Normal ROM.  No clubbing, cyanosis or edema.   NEURO: Alert, oriented, grossly unremarkable.  PSYCH: Cooperative, appropriate.

## 2021-12-18 NOTE — ED PROVIDER NOTE - CLINICAL SUMMARY MEDICAL DECISION MAKING FREE TEXT BOX
Patient presented with constant chest pain x 2 days. Otherwise afebrile, Hd stable, well appearing. EKG obtained and non-ischemic without evidence of STEMI. Obtained labs which were grossly unremarkable including no significant leukocytosis, anemia, signs of dehydration/JENNY, transaminitis or significant electrolyte abnormalities. Trop negative. Chest xray negative for pneumothorax, pneumonia, widened mediastinum, evidence of rib fractures, enlarged cardiac silhouette or any other emergent pathologies. Patient remained without recurrence of chest pain or abnormalities during monitoring in ED. Ambulatory without difficulty, tolerates PO. HEART Score 1, no concern clinically for PE or dissection. Additionally, pain is constant x 2 days and no EKG abnormalities and negative troponin, no significant concern for ACS. Given the above, will discharge home with outpatient follow up. Patient agreeable with plan. Agrees to return to ED for any new or worsening symptoms.

## 2021-12-18 NOTE — ED PROVIDER NOTE - PATIENT PORTAL LINK FT
You can access the FollowMyHealth Patient Portal offered by [facility] by registering at the following website: [email or web address]. By joining [employer]’s FollowMyHealth portal, you will also be able to view your health information using other applications (apps) compatible with our system.

## 2021-12-25 ENCOUNTER — TRANSCRIPTION ENCOUNTER (OUTPATIENT)
Age: 41
End: 2021-12-25

## 2022-02-08 ENCOUNTER — EMERGENCY (EMERGENCY)
Facility: HOSPITAL | Age: 42
LOS: 0 days | Discharge: HOME | End: 2022-02-08
Attending: EMERGENCY MEDICINE | Admitting: STUDENT IN AN ORGANIZED HEALTH CARE EDUCATION/TRAINING PROGRAM
Payer: MEDICAID

## 2022-02-08 VITALS
RESPIRATION RATE: 18 BRPM | SYSTOLIC BLOOD PRESSURE: 111 MMHG | OXYGEN SATURATION: 100 % | HEART RATE: 73 BPM | TEMPERATURE: 97 F | DIASTOLIC BLOOD PRESSURE: 57 MMHG

## 2022-02-08 VITALS
WEIGHT: 216.05 LBS | RESPIRATION RATE: 18 BRPM | SYSTOLIC BLOOD PRESSURE: 145 MMHG | DIASTOLIC BLOOD PRESSURE: 67 MMHG | HEIGHT: 66 IN | TEMPERATURE: 98 F | HEART RATE: 71 BPM | OXYGEN SATURATION: 100 %

## 2022-02-08 DIAGNOSIS — Z90.49 ACQUIRED ABSENCE OF OTHER SPECIFIED PARTS OF DIGESTIVE TRACT: Chronic | ICD-10-CM

## 2022-02-08 DIAGNOSIS — M54.50 LOW BACK PAIN, UNSPECIFIED: ICD-10-CM
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
## 2022-02-08 DIAGNOSIS — Z98.891 HISTORY OF UTERINE SCAR FROM PREVIOUS SURGERY: Chronic | ICD-10-CM

## 2022-02-08 PROCEDURE — 71101 X-RAY EXAM UNILAT RIBS/CHEST: CPT | Mod: 26,LT

## 2022-02-08 PROCEDURE — 99284 EMERGENCY DEPT VISIT MOD MDM: CPT

## 2022-02-08 PROCEDURE — 72100 X-RAY EXAM L-S SPINE 2/3 VWS: CPT | Mod: 26

## 2022-02-08 RX ORDER — KETOROLAC TROMETHAMINE 30 MG/ML
60 SYRINGE (ML) INJECTION ONCE
Refills: 0 | Status: DISCONTINUED | OUTPATIENT
Start: 2022-02-08 | End: 2022-02-08

## 2022-02-08 RX ORDER — METHOCARBAMOL 500 MG/1
1000 TABLET, FILM COATED ORAL ONCE
Refills: 0 | Status: COMPLETED | OUTPATIENT
Start: 2022-02-08 | End: 2022-02-08

## 2022-02-08 RX ADMIN — Medication 60 MILLIGRAM(S): at 20:59

## 2022-02-08 RX ADMIN — METHOCARBAMOL 1000 MILLIGRAM(S): 500 TABLET, FILM COATED ORAL at 20:59

## 2022-02-08 NOTE — ED PROVIDER NOTE - OBJECTIVE STATEMENT
Pt has left mid lower back pain that began 2 days ago while lifting a heavy mattress and twisting. heard a crack and then developed acute pain. denies weakness, numbness, fever, chills, NV urinary issues. No relief with Motrin.  Last dose last night. No pain meds taken today

## 2022-02-08 NOTE — ED ADULT TRIAGE NOTE - CHIEF COMPLAINT QUOTE
Pt s/p fall 2 days ago, c/o pain In L hip. Denies head injury, no anticoagulation. Pt states "I heard smbroderick pop"

## 2022-02-08 NOTE — ED PROVIDER NOTE - PHYSICAL EXAMINATION
CONST: Well appearing in NAD  NECK: Non-tender, no meningeal signs  CARD: Normal S1 S2; Normal rate and rhythm  RESP: Equal BS B/L, No wheezes, rhonchi or rales. No distress  GI: Soft, non-tender, non-distended.  MS: Normal ROM in all extremities. No midline spinal tenderness. There is paraspinal tenderness lumbar on left side. also tenderness left lower lateral ribs. No crepitous  SKIN: Warm, dry, no acute rashes. Good turgor  NEURO: A&Ox3, No focal deficits. Strength 5/5 with no sensory deficits. Steady gait

## 2022-02-08 NOTE — ED PROVIDER NOTE - NSFOLLOWUPCLINICS_GEN_ALL_ED_FT
[facility] ([facility])  Rehabilitation  [facility] [address] [place] [address]  Phone: [phone]  Fax:   Follow Up Time: 1-3 Days

## 2022-02-08 NOTE — ED PROVIDER NOTE - NS ED ROS FT
CONST: No fever, chills or bodyaches  EYES: No pain, redness, drainage or visual changes.  ENT: No ear pain or discharge, nasal discharge or congestion. No sore throat  CARD: No chest pain, palpitations  RESP: No SOB, cough, hemoptysis. No hx of asthma or COPD  GI: No abdominal pain, N/V/D  MS: No joint pain, (+) back pain  SKIN: No rashes  NEURO: No headache, dizziness, paresthesias or LOC

## 2022-02-08 NOTE — ED PROVIDER NOTE - PATIENT PORTAL LINK FT
You can access the FollowMyHealth Patient Portal offered by [facility] by registering at the following website: [email or web address]. By joining [employer]’s FollowMyHealth portal, you will also be able to view your health information using other applications (apps) compatible with our system.

## 2022-02-08 NOTE — ED PROVIDER NOTE - CLINICAL SUMMARY MEDICAL DECISION MAKING FREE TEXT BOX
41-year-old female presents to the ED for lower back pain while lifting a heavy mattress.  No red flags on history with saddle anesthesia, fecal incontinence, urinary retention.  Vitals reviewed by me noted to be wnl.  Physical exam noted to have paraspinal tenderness on the lumbar region on the left side of patient's back.  No focal neurological deficits with 5 out of 5 strength on the lower extremities with sensation intact.  Steady gait.  Due to patient's mechanism we obtained imaging with no spondylosis or spondylolisthesis of the lumbosacral spine.  Rib series obtained with no fractures noted.  ED Chest X-Ray reviewed by me which did not reveal a ptx, infiltrate, or effusion..  Given Toradol and Robaxin with moderate pain relief.  UA unremarkable.  Advised ambulation and Tylenol with NSAIDs for pain control.  Patient understood plan.  Discharged home with PMD follow-up.  Return precautions for intractable pain or inability to ambulate advised the patient.

## 2022-02-08 NOTE — ED PROVIDER NOTE - CARE PROVIDER_API CALL
Johnson Mack (MD)  Orthopaedic Surgery  [address] [place] [address]  Phone: [phone]  Fax: [phone]  Follow Up Time: 1-3 Days

## 2022-02-08 NOTE — ED PROVIDER NOTE - WR ORDER NAME 2
[address] [facility]  Electrodiagnostic Laboratory  *Accredited by the [address] [place] [address] with exemplary status  [address] [facility], [facility]  Phone: [phone]  Fax: [phone]    Referring Provider: JOSÉ ANTONIO Felton  Primary Care Physician: Anmol Smith MD  Patient Name: Adina Lopez  Patient YOB: 1937  Gender: female  BMI: Body mass index is 28.12 kg/m². Blood pressure (!) 151/62, pulse 65, temperature 97.8 °F (36.6 °C), temperature source Temporal, resp. rate 16, height 5' 5\" (1.651 m), weight 169 lb (76.7 kg), SpO2 97 %, not currently breastfeeding. 2/2/2021    Description of clinical problem:    The patient is coming in with symptoms of numbness and tingling in bilateral legs worse on sitting for long period of time and has recently progressed to the bilateral hands. Chief Complaint   Patient presents with    Procedure     EMG one arm one leg      Pain No   ; Numbness/tingling  Yes; Weakness  No       Brief physical exam:   Sensory deficit No; Weakness No; Atrophy  No; Reflex abnormality Yes  Motor NCS      Nerve / Sites Latency Amplitude Amp. 1-2 Distance Lat Diff Velocity Temp.    ms mV % cm ms m/s °C   R Median - APB      Wrist 4.01 12.9 100 8   34.1      Elbow 8.33 12.0 93.6 23 4.32 53 34.1   R Ulnar - ADM      Wrist 3.44 13.4 100 8   34.4      B. Elbow 6.67 12.0 89.7 19 3.23 59 34.3      A. Elbow 8.23 11.1 82.9 10 1.56 64 34   R Peroneal - EDB      Ankle 4.43 2.1 100 8   30.1      Pop fossa 12.60 1.6 76.9 36 8.18 44 30.2   R Tibial - AH      Ankle 4.69 7.7 100 8   30.8      Pop fossa 14.06 6.2 80.6 41 9.38 44 31.3       Sensory NCS      Nerve / Sites Onset Lat Peak Lat PP Amp Amp. 1-2 Distance Velocity Temp.    ms ms µV % cm m/s °C   R Median - Digit II (Antidromic)      Mid Palm 1.88 2.76 59.0 100 7 37 33.6      Wrist 3.49 4.38 46.3 88.7 14 40 33.6   R Ulnar - Digit V (Antidromic)      Wrist 3.33 4.22 57.4 100 14 42 33.6   R Radial - Anatomical  (Forearm)      Forearm needle. · The following abnormalities were seen on needle EMG: The right gastrocnemius muscle revealed mildly increased amplitude and duration of motor units with reduced recruitment.  All other muscles tested, as listed in the table above demonstrated normal amplitude, duration, phases and recruitment and no active denervation signs were seen. Diagnostic Interpretation: This study was abnormal.     Electrodiagnosis:     The electrical finding of the study reveals evidence of demyelinating sensory median neuropathy at the wrist consistent with mild right-sided carpal tunnel syndrome. 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 The study also reveals evidence of chronic neurogenic changes in the right S1 myotome indicative of a right chronic S1 radiculopathy. Previous Study: NA      Follow up EMG is recommended if continue to get worse in 3 to 6 months. Technologist: Vane Erazo    Physician: Yin Linda MD    Nerve conduction studies and electromyography were performed according to our laboratory policies and procedures which can be provided upon request. All abnormal values are identified in the table.  Laboratory normal values can also be provided upon request.       Cc: JOSÉ ANTONIO Gillespie MD Xray Ribs 3 Views, Left

## 2022-02-09 RX ORDER — METHOCARBAMOL 500 MG/1
2 TABLET, FILM COATED ORAL
Qty: 18 | Refills: 0
Start: 2022-02-09 | End: 2022-02-11

## 2022-02-24 ENCOUNTER — EMERGENCY (EMERGENCY)
Facility: HOSPITAL | Age: 42
LOS: 0 days | Discharge: HOME | End: 2022-02-24
Attending: EMERGENCY MEDICINE | Admitting: EMERGENCY MEDICINE
Payer: MEDICAID

## 2022-02-24 VITALS
OXYGEN SATURATION: 99 % | DIASTOLIC BLOOD PRESSURE: 78 MMHG | WEIGHT: 211.42 LBS | HEART RATE: 83 BPM | SYSTOLIC BLOOD PRESSURE: 143 MMHG | RESPIRATION RATE: 19 BRPM | HEIGHT: 66 IN | TEMPERATURE: 98 F

## 2022-02-24 DIAGNOSIS — Z88.0 ALLERGY STATUS TO PENICILLIN: ICD-10-CM

## 2022-02-24 DIAGNOSIS — R10.32 LEFT LOWER QUADRANT PAIN: ICD-10-CM

## 2022-02-24 DIAGNOSIS — K51.00 ULCERATIVE (CHRONIC) PANCOLITIS WITHOUT COMPLICATIONS: ICD-10-CM

## 2022-02-24 DIAGNOSIS — N89.8 OTHER SPECIFIED NONINFLAMMATORY DISORDERS OF VAGINA: ICD-10-CM

## 2022-02-24 DIAGNOSIS — Z90.49 ACQUIRED ABSENCE OF OTHER SPECIFIED PARTS OF DIGESTIVE TRACT: Chronic | ICD-10-CM

## 2022-02-24 DIAGNOSIS — Z98.891 HISTORY OF UTERINE SCAR FROM PREVIOUS SURGERY: Chronic | ICD-10-CM

## 2022-02-24 DIAGNOSIS — R11.0 NAUSEA: ICD-10-CM

## 2022-02-24 LAB
ALBUMIN SERPL ELPH-MCNC: 4.2 G/DL — SIGNIFICANT CHANGE UP (ref 3.5–5.2)
ALP SERPL-CCNC: 58 U/L — SIGNIFICANT CHANGE UP (ref 30–115)
ALT FLD-CCNC: 12 U/L — SIGNIFICANT CHANGE UP (ref 0–41)
ANION GAP SERPL CALC-SCNC: 11 MMOL/L — SIGNIFICANT CHANGE UP (ref 7–14)
AST SERPL-CCNC: 23 U/L — SIGNIFICANT CHANGE UP (ref 0–41)
BASOPHILS # BLD AUTO: 0.04 K/UL — SIGNIFICANT CHANGE UP (ref 0–0.2)
BASOPHILS NFR BLD AUTO: 0.7 % — SIGNIFICANT CHANGE UP (ref 0–1)
BILIRUB SERPL-MCNC: <0.2 MG/DL — SIGNIFICANT CHANGE UP (ref 0.2–1.2)
BUN SERPL-MCNC: 18 MG/DL — SIGNIFICANT CHANGE UP (ref 10–20)
CALCIUM SERPL-MCNC: 9.2 MG/DL — SIGNIFICANT CHANGE UP (ref 8.5–10.1)
CHLORIDE SERPL-SCNC: 105 MMOL/L — SIGNIFICANT CHANGE UP (ref 98–110)
CO2 SERPL-SCNC: 25 MMOL/L — SIGNIFICANT CHANGE UP (ref 17–32)
CREAT SERPL-MCNC: 0.7 MG/DL — SIGNIFICANT CHANGE UP (ref 0.7–1.5)
EOSINOPHIL # BLD AUTO: 0.15 K/UL — SIGNIFICANT CHANGE UP (ref 0–0.7)
EOSINOPHIL NFR BLD AUTO: 2.5 % — SIGNIFICANT CHANGE UP (ref 0–8)
GLUCOSE SERPL-MCNC: 126 MG/DL — HIGH (ref 70–99)
HCG SERPL QL: NEGATIVE — SIGNIFICANT CHANGE UP
HCT VFR BLD CALC: 36.7 % — LOW (ref 37–47)
HGB BLD-MCNC: 11.9 G/DL — LOW (ref 12–16)
IMM GRANULOCYTES NFR BLD AUTO: 0.3 % — SIGNIFICANT CHANGE UP (ref 0.1–0.3)
LYMPHOCYTES # BLD AUTO: 2.47 K/UL — SIGNIFICANT CHANGE UP (ref 1.2–3.4)
LYMPHOCYTES # BLD AUTO: 41.6 % — SIGNIFICANT CHANGE UP (ref 20.5–51.1)
MCHC RBC-ENTMCNC: 29.2 PG — SIGNIFICANT CHANGE UP (ref 27–31)
MCHC RBC-ENTMCNC: 32.4 G/DL — SIGNIFICANT CHANGE UP (ref 32–37)
MCV RBC AUTO: 90.2 FL — SIGNIFICANT CHANGE UP (ref 81–99)
MONOCYTES # BLD AUTO: 0.49 K/UL — SIGNIFICANT CHANGE UP (ref 0.1–0.6)
MONOCYTES NFR BLD AUTO: 8.2 % — SIGNIFICANT CHANGE UP (ref 1.7–9.3)
NEUTROPHILS # BLD AUTO: 2.77 K/UL — SIGNIFICANT CHANGE UP (ref 1.4–6.5)
NEUTROPHILS NFR BLD AUTO: 46.7 % — SIGNIFICANT CHANGE UP (ref 42.2–75.2)
NRBC # BLD: 0 /100 WBCS — SIGNIFICANT CHANGE UP (ref 0–0)
PLATELET # BLD AUTO: 274 K/UL — SIGNIFICANT CHANGE UP (ref 130–400)
POTASSIUM SERPL-MCNC: 4.4 MMOL/L — SIGNIFICANT CHANGE UP (ref 3.5–5)
POTASSIUM SERPL-SCNC: 4.4 MMOL/L — SIGNIFICANT CHANGE UP (ref 3.5–5)
PROT SERPL-MCNC: 6.7 G/DL — SIGNIFICANT CHANGE UP (ref 6–8)
RBC # BLD: 4.07 M/UL — LOW (ref 4.2–5.4)
RBC # FLD: 12.8 % — SIGNIFICANT CHANGE UP (ref 11.5–14.5)
SODIUM SERPL-SCNC: 141 MMOL/L — SIGNIFICANT CHANGE UP (ref 135–146)
WBC # BLD: 5.94 K/UL — SIGNIFICANT CHANGE UP (ref 4.8–10.8)
WBC # FLD AUTO: 5.94 K/UL — SIGNIFICANT CHANGE UP (ref 4.8–10.8)

## 2022-02-24 PROCEDURE — 99285 EMERGENCY DEPT VISIT HI MDM: CPT

## 2022-02-24 PROCEDURE — 74177 CT ABD & PELVIS W/CONTRAST: CPT | Mod: 26,MA

## 2022-02-24 RX ORDER — SODIUM CHLORIDE 9 MG/ML
1000 INJECTION INTRAMUSCULAR; INTRAVENOUS; SUBCUTANEOUS ONCE
Refills: 0 | Status: DISCONTINUED | OUTPATIENT
Start: 2022-02-24 | End: 2022-02-24

## 2022-02-24 RX ORDER — MORPHINE SULFATE 50 MG/1
4 CAPSULE, EXTENDED RELEASE ORAL ONCE
Refills: 0 | Status: DISCONTINUED | OUTPATIENT
Start: 2022-02-24 | End: 2022-02-24

## 2022-02-24 RX ORDER — ONDANSETRON 8 MG/1
4 TABLET, FILM COATED ORAL ONCE
Refills: 0 | Status: COMPLETED | OUTPATIENT
Start: 2022-02-24 | End: 2022-02-24

## 2022-02-24 RX ORDER — SODIUM CHLORIDE 9 MG/ML
2000 INJECTION INTRAMUSCULAR; INTRAVENOUS; SUBCUTANEOUS ONCE
Refills: 0 | Status: COMPLETED | OUTPATIENT
Start: 2022-02-24 | End: 2022-02-24

## 2022-02-24 RX ORDER — SACCHAROMYCES BOULARDII 250 MG
1 POWDER IN PACKET (EA) ORAL
Qty: 30 | Refills: 0
Start: 2022-02-24 | End: 2022-03-10

## 2022-02-24 RX ORDER — CIPROFLOXACIN LACTATE 400MG/40ML
400 VIAL (ML) INTRAVENOUS ONCE
Refills: 0 | Status: COMPLETED | OUTPATIENT
Start: 2022-02-24 | End: 2022-02-24

## 2022-02-24 RX ORDER — METRONIDAZOLE 500 MG
1 TABLET ORAL
Qty: 30 | Refills: 0
Start: 2022-02-24 | End: 2022-03-05

## 2022-02-24 RX ORDER — METRONIDAZOLE 500 MG
500 TABLET ORAL ONCE
Refills: 0 | Status: COMPLETED | OUTPATIENT
Start: 2022-02-24 | End: 2022-02-24

## 2022-02-24 RX ORDER — CIPROFLOXACIN LACTATE 400MG/40ML
1 VIAL (ML) INTRAVENOUS
Qty: 20 | Refills: 0
Start: 2022-02-24 | End: 2022-03-05

## 2022-02-24 RX ADMIN — Medication 200 MILLIGRAM(S): at 06:56

## 2022-02-24 RX ADMIN — SODIUM CHLORIDE 2000 MILLILITER(S): 9 INJECTION INTRAMUSCULAR; INTRAVENOUS; SUBCUTANEOUS at 03:44

## 2022-02-24 RX ADMIN — Medication 100 MILLIGRAM(S): at 06:56

## 2022-02-24 RX ADMIN — MORPHINE SULFATE 4 MILLIGRAM(S): 50 CAPSULE, EXTENDED RELEASE ORAL at 03:44

## 2022-02-24 RX ADMIN — ONDANSETRON 4 MILLIGRAM(S): 8 TABLET, FILM COATED ORAL at 03:44

## 2022-02-24 NOTE — ED PROVIDER NOTE - OBJECTIVE STATEMENT
41-year-old female with past medical history uncomplicated diverticulitis, 3 C-sections, presents with moderate constant worsening LLQ abdominal pain x 3 weeks, worse in the last few days. + Associated with nausea, soft stools. No palliating/provoking factors.  +At onset went to her PMD Dr. Gupta who gave her Cipro/Flagyl which she only took for 6 days 2/2 nausea (no ct at that time).   No fever, back pain, vomiting, chest pain, shortness of breath, other symptoms.

## 2022-02-24 NOTE — ED PROVIDER NOTE - ATTENDING CONTRIBUTION TO CARE
pt co LLQ pain for 1 day. sharp. non rad. assoc with n, v. nbnb. dx clinically 3 weeks ago with diverticulitis and took course of abx. no urinary sx. no fever.    pt in mild dist, anict, ctab, rrr, ab soft, tender LLQ, no rbt. no cvat.    imaging, labs, supportive care

## 2022-02-24 NOTE — ED PROVIDER NOTE - CARE PROVIDERS DIRECT ADDRESSES
[email or web address]

## 2022-02-24 NOTE — ED PROVIDER NOTE - CARE PROVIDER_API CALL
Nakul Urban)  Gastroenterology; Internal Medicine  [address] [place] [address]  Phone: [phone]  Fax: [phone]  Follow Up Time: 1-3 Days    your pmd in1-3 days,   Phone: (   )    -  Fax: (   )    -  Follow Up Time:

## 2022-02-24 NOTE — ED PROVIDER NOTE - PHYSICAL EXAMINATION
PHYSICAL EXAM:    GENERAL: Alert, appears stated age, well appearing, non-toxic  SKIN: Warm, pink and dry.   EYE: Normal lids/conjunctiva  ENT: Normal hearing, patent oropharynx  NECK: +supple. No meningismus, or JVD  Pulm: Bilateral BS, normal resp effort, no wheezes, stridor, or retractions  CV: RRR, no M/R/G, 2+and = radial pulses  Abd: soft, +LLQ/LUQ TTP, non-distended, no rebound/guarding. no CVA tenderness.   Mskel: no erythema, cyanosis, edema. no calf tenderness  Neuro: AAOx3, normal gait

## 2022-02-24 NOTE — ED PROVIDER NOTE - PATIENT PORTAL LINK FT
You can access the FollowMyHealth Patient Portal offered by [facility] by registering at the following website: [email or web address]. By joining [employer]’s FollowMyHealth portal, you will also be able to view your health information using other applications (apps) compatible with our system.

## 2022-02-24 NOTE — ED PROVIDER NOTE - PROVIDER TOKENS
PROVIDER:[TOKEN:[[id number]],FOLLOWUP:[1-3 Days]],FREE:[LAST:[your pmd in1-3 days],PHONE:[(   )    -],FAX:[(   )    -]]

## 2022-02-24 NOTE — ED ADULT NURSE NOTE - OBJECTIVE STATEMENT
Pt c/o LLQ pain started approx 1 hr ago with nausea, chills, no vomiting. Woke up from sleep. Similar pain approx 3 weeks ago. Pt took antibiotics for episode of diverticulitis but couldn't continue course because of nausea. Pain on palpation, guarding. 3 Cesarian sections. Appendectomy.

## 2022-04-06 ENCOUNTER — APPOINTMENT (OUTPATIENT)
Dept: NEUROLOGY | Facility: CLINIC | Age: 42
End: 2022-04-06

## 2022-06-15 NOTE — ED ADULT NURSE NOTE - PAIN: RADIATION
Hypertension is improving with treatment.  Continue current treatment regimen.  Dietary sodium restriction.  Weight loss.  Regular aerobic exercise.  Continue current medications.  Ambulatory blood pressure monitoring.  Blood pressure will be reassessed at the next regular appointment.   (no pain)

## 2022-07-26 ENCOUNTER — APPOINTMENT (OUTPATIENT)
Dept: NEUROLOGY | Facility: CLINIC | Age: 42
End: 2022-07-26

## 2022-07-30 ENCOUNTER — EMERGENCY (EMERGENCY)
Facility: HOSPITAL | Age: 42
LOS: 0 days | Discharge: HOME | End: 2022-07-30
Attending: STUDENT IN AN ORGANIZED HEALTH CARE EDUCATION/TRAINING PROGRAM

## 2022-07-30 VITALS
TEMPERATURE: 98 F | SYSTOLIC BLOOD PRESSURE: 125 MMHG | WEIGHT: 216.05 LBS | OXYGEN SATURATION: 100 % | RESPIRATION RATE: 18 BRPM | HEART RATE: 91 BPM | DIASTOLIC BLOOD PRESSURE: 58 MMHG | HEIGHT: 66 IN

## 2022-07-30 VITALS
OXYGEN SATURATION: 100 % | HEART RATE: 61 BPM | DIASTOLIC BLOOD PRESSURE: 57 MMHG | SYSTOLIC BLOOD PRESSURE: 104 MMHG | RESPIRATION RATE: 18 BRPM

## 2022-07-30 DIAGNOSIS — Z98.891 HISTORY OF UTERINE SCAR FROM PREVIOUS SURGERY: Chronic | ICD-10-CM

## 2022-07-30 DIAGNOSIS — Z86.711 PERSONAL HISTORY OF PULMONARY EMBOLISM: ICD-10-CM

## 2022-07-30 DIAGNOSIS — M54.9 DORSALGIA, UNSPECIFIED: ICD-10-CM

## 2022-07-30 DIAGNOSIS — Z87.19 PERSONAL HISTORY OF OTHER DISEASES OF THE DIGESTIVE SYSTEM: ICD-10-CM

## 2022-07-30 DIAGNOSIS — R10.816 EPIGASTRIC ABDOMINAL TENDERNESS: ICD-10-CM

## 2022-07-30 DIAGNOSIS — R06.02 SHORTNESS OF BREATH: ICD-10-CM

## 2022-07-30 DIAGNOSIS — Z86.19 PERSONAL HISTORY OF OTHER INFECTIOUS AND PARASITIC DISEASES: ICD-10-CM

## 2022-07-30 DIAGNOSIS — Z88.0 ALLERGY STATUS TO PENICILLIN: ICD-10-CM

## 2022-07-30 DIAGNOSIS — Z90.49 ACQUIRED ABSENCE OF OTHER SPECIFIED PARTS OF DIGESTIVE TRACT: Chronic | ICD-10-CM

## 2022-07-30 DIAGNOSIS — R11.0 NAUSEA: ICD-10-CM

## 2022-07-30 DIAGNOSIS — R10.9 UNSPECIFIED ABDOMINAL PAIN: ICD-10-CM

## 2022-07-30 LAB
ALBUMIN SERPL ELPH-MCNC: 4.5 G/DL — SIGNIFICANT CHANGE UP (ref 3.5–5.2)
ALP SERPL-CCNC: 67 U/L — SIGNIFICANT CHANGE UP (ref 30–115)
ALT FLD-CCNC: 15 U/L — SIGNIFICANT CHANGE UP (ref 0–41)
ANION GAP SERPL CALC-SCNC: 10 MMOL/L — SIGNIFICANT CHANGE UP (ref 7–14)
APPEARANCE UR: CLEAR — SIGNIFICANT CHANGE UP
AST SERPL-CCNC: 17 U/L — SIGNIFICANT CHANGE UP (ref 0–41)
BASOPHILS # BLD AUTO: 0.03 K/UL — SIGNIFICANT CHANGE UP (ref 0–0.2)
BASOPHILS NFR BLD AUTO: 0.5 % — SIGNIFICANT CHANGE UP (ref 0–1)
BILIRUB DIRECT SERPL-MCNC: <0.2 MG/DL — SIGNIFICANT CHANGE UP (ref 0–0.3)
BILIRUB INDIRECT FLD-MCNC: SIGNIFICANT CHANGE UP MG/DL (ref 0.2–1.2)
BILIRUB SERPL-MCNC: <0.2 MG/DL — SIGNIFICANT CHANGE UP (ref 0.2–1.2)
BILIRUB UR-MCNC: NEGATIVE — SIGNIFICANT CHANGE UP
BUN SERPL-MCNC: 11 MG/DL — SIGNIFICANT CHANGE UP (ref 10–20)
CALCIUM SERPL-MCNC: 9.5 MG/DL — SIGNIFICANT CHANGE UP (ref 8.5–10.1)
CHLORIDE SERPL-SCNC: 105 MMOL/L — SIGNIFICANT CHANGE UP (ref 98–110)
CO2 SERPL-SCNC: 25 MMOL/L — SIGNIFICANT CHANGE UP (ref 17–32)
COLOR SPEC: YELLOW — SIGNIFICANT CHANGE UP
CREAT SERPL-MCNC: 0.7 MG/DL — SIGNIFICANT CHANGE UP (ref 0.7–1.5)
DIFF PNL FLD: NEGATIVE — SIGNIFICANT CHANGE UP
EGFR: 111 ML/MIN/1.73M2 — SIGNIFICANT CHANGE UP
EOSINOPHIL # BLD AUTO: 0.16 K/UL — SIGNIFICANT CHANGE UP (ref 0–0.7)
EOSINOPHIL NFR BLD AUTO: 2.7 % — SIGNIFICANT CHANGE UP (ref 0–8)
GLUCOSE SERPL-MCNC: 78 MG/DL — SIGNIFICANT CHANGE UP (ref 70–99)
GLUCOSE UR QL: NEGATIVE — SIGNIFICANT CHANGE UP
HCT VFR BLD CALC: 33.6 % — LOW (ref 37–47)
HGB BLD-MCNC: 11.3 G/DL — LOW (ref 12–16)
IMM GRANULOCYTES NFR BLD AUTO: 0.7 % — HIGH (ref 0.1–0.3)
KETONES UR-MCNC: NEGATIVE — SIGNIFICANT CHANGE UP
LACTATE SERPL-SCNC: 0.7 MMOL/L — SIGNIFICANT CHANGE UP (ref 0.7–2)
LEUKOCYTE ESTERASE UR-ACNC: NEGATIVE — SIGNIFICANT CHANGE UP
LIDOCAIN IGE QN: 34 U/L — SIGNIFICANT CHANGE UP (ref 7–60)
LYMPHOCYTES # BLD AUTO: 2.14 K/UL — SIGNIFICANT CHANGE UP (ref 1.2–3.4)
LYMPHOCYTES # BLD AUTO: 36.8 % — SIGNIFICANT CHANGE UP (ref 20.5–51.1)
MCHC RBC-ENTMCNC: 30.2 PG — SIGNIFICANT CHANGE UP (ref 27–31)
MCHC RBC-ENTMCNC: 33.6 G/DL — SIGNIFICANT CHANGE UP (ref 32–37)
MCV RBC AUTO: 89.8 FL — SIGNIFICANT CHANGE UP (ref 81–99)
MONOCYTES # BLD AUTO: 0.55 K/UL — SIGNIFICANT CHANGE UP (ref 0.1–0.6)
MONOCYTES NFR BLD AUTO: 9.5 % — HIGH (ref 1.7–9.3)
NEUTROPHILS # BLD AUTO: 2.9 K/UL — SIGNIFICANT CHANGE UP (ref 1.4–6.5)
NEUTROPHILS NFR BLD AUTO: 49.8 % — SIGNIFICANT CHANGE UP (ref 42.2–75.2)
NITRITE UR-MCNC: NEGATIVE — SIGNIFICANT CHANGE UP
NRBC # BLD: 0 /100 WBCS — SIGNIFICANT CHANGE UP (ref 0–0)
PH UR: 6 — SIGNIFICANT CHANGE UP (ref 5–8)
PLATELET # BLD AUTO: 247 K/UL — SIGNIFICANT CHANGE UP (ref 130–400)
POTASSIUM SERPL-MCNC: 4.3 MMOL/L — SIGNIFICANT CHANGE UP (ref 3.5–5)
POTASSIUM SERPL-SCNC: 4.3 MMOL/L — SIGNIFICANT CHANGE UP (ref 3.5–5)
PROT SERPL-MCNC: 6.8 G/DL — SIGNIFICANT CHANGE UP (ref 6–8)
PROT UR-MCNC: NEGATIVE — SIGNIFICANT CHANGE UP
RBC # BLD: 3.74 M/UL — LOW (ref 4.2–5.4)
RBC # FLD: 12.8 % — SIGNIFICANT CHANGE UP (ref 11.5–14.5)
SODIUM SERPL-SCNC: 140 MMOL/L — SIGNIFICANT CHANGE UP (ref 135–146)
SP GR SPEC: 1.02 — SIGNIFICANT CHANGE UP (ref 1.01–1.03)
UROBILINOGEN FLD QL: SIGNIFICANT CHANGE UP
WBC # BLD: 5.82 K/UL — SIGNIFICANT CHANGE UP (ref 4.8–10.8)
WBC # FLD AUTO: 5.82 K/UL — SIGNIFICANT CHANGE UP (ref 4.8–10.8)

## 2022-07-30 PROCEDURE — 99285 EMERGENCY DEPT VISIT HI MDM: CPT

## 2022-07-30 PROCEDURE — 74177 CT ABD & PELVIS W/CONTRAST: CPT | Mod: 26,MA

## 2022-07-30 RX ORDER — ONDANSETRON 8 MG/1
4 TABLET, FILM COATED ORAL ONCE
Refills: 0 | Status: COMPLETED | OUTPATIENT
Start: 2022-07-30 | End: 2022-07-30

## 2022-07-30 RX ORDER — MORPHINE SULFATE 50 MG/1
4 CAPSULE, EXTENDED RELEASE ORAL ONCE
Refills: 0 | Status: DISCONTINUED | OUTPATIENT
Start: 2022-07-30 | End: 2022-07-30

## 2022-07-30 RX ORDER — METHOCARBAMOL 500 MG/1
1500 TABLET, FILM COATED ORAL ONCE
Refills: 0 | Status: COMPLETED | OUTPATIENT
Start: 2022-07-30 | End: 2022-07-30

## 2022-07-30 RX ORDER — KETOROLAC TROMETHAMINE 30 MG/ML
15 SYRINGE (ML) INJECTION ONCE
Refills: 0 | Status: DISCONTINUED | OUTPATIENT
Start: 2022-07-30 | End: 2022-07-30

## 2022-07-30 RX ORDER — METHOCARBAMOL 500 MG/1
2 TABLET, FILM COATED ORAL
Qty: 28 | Refills: 0
Start: 2022-07-30 | End: 2022-08-05

## 2022-07-30 RX ADMIN — ONDANSETRON 4 MILLIGRAM(S): 8 TABLET, FILM COATED ORAL at 18:55

## 2022-07-30 RX ADMIN — MORPHINE SULFATE 4 MILLIGRAM(S): 50 CAPSULE, EXTENDED RELEASE ORAL at 18:55

## 2022-07-30 RX ADMIN — METHOCARBAMOL 1500 MILLIGRAM(S): 500 TABLET, FILM COATED ORAL at 23:38

## 2022-07-30 RX ADMIN — Medication 15 MILLIGRAM(S): at 23:38

## 2022-07-30 NOTE — ED PROVIDER NOTE - CLINICAL SUMMARY MEDICAL DECISION MAKING FREE TEXT BOX
41-year-old female presented with flank pain.  Work-up for acute pathology unremarkable, including lab work, urine, and CT abdomen pelvis.  Results discussed with patient and daughter.  Patient to be discharged from ED. Any available test results were discussed with patient and/or family. Verbal instructions given, including instructions to return to ED immediately for any new, worsening, or concerning symptoms. Patient endorsed understanding. Written discharge instructions additionally given, including follow-up plan.

## 2022-07-30 NOTE — ED ADULT NURSE NOTE - OBJECTIVE STATEMENT
The patient is a 41y Female complaining of left sided flank pain that radiates to her left sided abdomen, c/o nausea

## 2022-07-30 NOTE — ED PROVIDER NOTE - ATTENDING CONTRIBUTION TO CARE
41-year-old female past medical history of diverticulitis, presents emergency department with left-sided flank pain for 2 days radiating to left side of abdomen.  Patient reports pain is moderate, intermittent, and worsening.  No exacerbating or relieving factors.  Patient notices a foul odor to the urine but no hematuria.    No fever, vomiting, chest pain, sob, palpitations, diaphoresis, cough, headache, dizziness, numbness/tingling, neck pain/stiffness, diarrhea, constipation, melena/brbpr, trauma, weakness, edema, calf pain or swelling, sick contacts, recent travel or rash.     Vital Signs: I have reviewed the initial vital signs.  Constitutional: Patient in no acute distress.  Integumentary: No rash.  ENT: MMM  NECK: Supple.   Cardiovascular: RRR, radial pulses 2/4 bilaterally  Respiratory: Breath sounds CTA b/l, no wheezing or crackles, good air exchange, good resp effort and excursion, no accessory muscle use, no stridor.  Gastrointestinal: Abdomen soft, +LLQ TTP, non-distended, rebound tenderness or guarding, +L CVAT.   Musculoskeletal: FROM, no edema, no calf pain/swelling/erythema.  Neurologic: AAOx3, motor 5/5 and sensation intact throughout upper and lower ext, No focal deficits.

## 2022-07-30 NOTE — ED PROVIDER NOTE - NS ED ROS FT
Constitutional:  No fever, chills, lethargy, or abnormal weight loss  Eyes:  No eye pain or visual changes  ENMT: No nasal discharge, no toothache, no sore throat. No neck pain or stiffness  Cardiac:  No chest pain or palpitations  Respiratory:  No cough or respiratory distress.   GI:  No nausea, vomiting, diarrhea. Positive for abdominal pain. Positive for flank pain.   :  No dysuria, frequency or burning.  MS:  No joint pain. positive for back pain.   Neuro:  No headache. No numbness, weakness, or tingling.   Skin:  No skin rash  Except as documented in the HPI,  all other systems are negative

## 2022-07-30 NOTE — ED ADULT TRIAGE NOTE - BMI (KG/M2)
decreased ability to use arms for pushing/pulling/decreased ability to use legs for bridging/pushing
34.9

## 2022-07-30 NOTE — ED PROVIDER NOTE - PHYSICAL EXAMINATION
VITAL SIGNS: I have reviewed nursing notes and confirm.  CONSTITUTIONAL: well-appearing, non-toxic, NAD  SKIN: Warm dry, normal skin turgor  HEAD: NCAT  EYES: EOMI, PERRLA, no scleral icterus  ENT: Moist mucous membranes, normal pharynx with no erythema or exudates  NECK: Supple; non tender. Full ROM. No cervical LAD  CARD: RRR, no murmurs, rubs or gallops  RESP: clear to ausculation b/l.  No rales, rhonchi, or wheezing.  ABD: soft, + BS, non-distended, no rebound or guarding. Epigastric and left flank tenderness to palpation.   MSK: Left-sided paraspinal tenderness.   EXT: Full ROM, no bony tenderness, no pedal edema, no calf tenderness  NEURO: normal motor. normal sensory. CN II-XII intact. Cerebellar testing normal. Normal gait.  PSYCH: Cooperative, appropriate.

## 2022-07-30 NOTE — ED PROVIDER NOTE - NSFOLLOWUPCLINICS_GEN_ALL_ED_FT
A Family Medicine Doctor  Family Medicine  .  [place]   Phone:   Fax:     [facility] ([facility])  Rehabilitation  [facility] [address], [address] [place] [address]  Phone: [phone]  Fax:

## 2022-07-30 NOTE — ED ADULT NURSE REASSESSMENT NOTE - NS ED NURSE REASSESS COMMENT FT1
PT is A&Ox4 came for lower back pain. Change of shift report given by previous day shift nurse about pt had a fall. PT is placed on hallway stretcher bed near nursing station with bed alarm on and activate. PT denies hitting on head, and denies injuries. Will continue to monitor pt.

## 2022-07-30 NOTE — ED PROVIDER NOTE - OBJECTIVE STATEMENT
Patient is a 41y female with a PMHx of PE (6-7 years ago, not currently on anticoagulants), diverticulitis, colitis who presents to the ED for a 2-day history of left flank pain. Patient states the left flank pain started randomly and has continued to get worse. She describes the pain as sharp and left-sided that "spreads to her abdomen and [her] entire left side". She states she has felt nauseous since the pain began and has felt shortness of breath. She also reports her urine has been "light green" in color and has smelled differently. She has no history of kidney stones. Patient endorses nausea, abdominal pain. Patient denies fevers, chest pain, diarrhea, constipation, dysuria, hematuria, frequency, dizziness.

## 2022-07-30 NOTE — ED PROVIDER NOTE - PROGRESS NOTE DETAILS
Resident RG: Called Radiology for CT read. Signed out patient to Ben CAMEJO). d/w pt and daughter all results, pt relieved not a stone. now thinks it is 2/2 sciatica which she has had in the past and felt similar. Discussed side effects from NSAIDs and muscle relaxant  Risk GI upset/gastritis/GERD from NSAIDs. May take OTC Prilosec.  Advised not to drive or operate heavy machinery while on robaxin due to sedation risk. May take only at night if sedation occurs.

## 2022-07-30 NOTE — ED PROVIDER NOTE - PATIENT PORTAL LINK FT
You can access the FollowMyHealth Patient Portal offered by [facility] by registering at the following website: [email or web address]. By joining [employer]’s FollowMyHealth portal, you will also be able to view your health information using other applications (apps) compatible with our system.

## 2022-08-01 LAB
CULTURE RESULTS: SIGNIFICANT CHANGE UP
SPECIMEN SOURCE: SIGNIFICANT CHANGE UP

## 2022-08-06 ENCOUNTER — NON-APPOINTMENT (OUTPATIENT)
Age: 42
End: 2022-08-06

## 2022-08-08 NOTE — ED ADULT NURSE NOTE - CINV DISCH TEACH PARTICIP
Quality 111:Pneumonia Vaccination Status For Older Adults: Pneumococcal vaccine administered on or after patient’s 60th birthday and before the end of the measurement period Quality 130: Documentation Of Current Medications In The Medical Record: Current Medications Documented Detail Level: Generalized Quality 110: Preventive Care And Screening: Influenza Immunization: Influenza Immunization Administered during Influenza season Patient

## 2022-10-06 PROBLEM — U07.1 COVID-19: Chronic | Status: ACTIVE | Noted: 2021-01-28

## 2022-10-13 ENCOUNTER — NON-APPOINTMENT (OUTPATIENT)
Age: 42
End: 2022-10-13

## 2022-10-31 ENCOUNTER — APPOINTMENT (OUTPATIENT)
Dept: NEUROLOGY | Facility: CLINIC | Age: 42
End: 2022-10-31

## 2022-10-31 VITALS
HEART RATE: 111 BPM | DIASTOLIC BLOOD PRESSURE: 79 MMHG | BODY MASS INDEX: 34.39 KG/M2 | HEIGHT: 66 IN | SYSTOLIC BLOOD PRESSURE: 125 MMHG | WEIGHT: 214 LBS

## 2022-10-31 DIAGNOSIS — M54.50 LOW BACK PAIN, UNSPECIFIED: ICD-10-CM

## 2022-10-31 DIAGNOSIS — G43.909 MIGRAINE, UNSPECIFIED, NOT INTRACTABLE, W/OUT STATUS MIGRAINOSUS: ICD-10-CM

## 2022-10-31 PROCEDURE — 99214 OFFICE O/P EST MOD 30 MIN: CPT

## 2022-10-31 RX ORDER — DIVALPROEX SODIUM 250 MG/1
250 TABLET, DELAYED RELEASE ORAL TWICE DAILY
Qty: 60 | Refills: 0 | Status: DISCONTINUED | COMMUNITY
Start: 2021-10-13 | End: 2022-10-31
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
## 2022-11-01 NOTE — ASSESSMENT
[FreeTextEntry1] : Patient is 40 yo Female RH dominant, with PMHx migraines, pulmonary embolism on oral contraceptives about 6-7 years ago, COVID in 1/2021, abnormal leg movements who presents for follow up. \par On exam no focal findings. Patient episodes of headaches with facial twitching as well as restlessness. These are probably consistent with complicated migraines. MRI brain from 10/2021 was unremarkable. Previous EEG was unremarkable. Patient denies any stressors in her life, no noticeable pattern. \par \par PLAN:\par 1. will try to send PA for injection for migraine\par 2. Mri Brain w/o RENETTA (repeat)\par 3. MRI lumbar spine w/o RENETTA\par 4. Trial of Nurtec ODT  at onset of symptoms\par 5. rEEG\par 6. Follow up in 6 months

## 2022-11-01 NOTE — END OF VISIT
[] : Resident [Time Spent: ___ minutes] : I have spent [unfilled] minutes of time on the encounter. [FreeTextEntry3] : Patient having continued headaches for last few months with some shaking of body.  C/o worsening lower back pain and was unable to get MRI lumbar spine from previously\par Has been doing PT with some improvement\par \par Plan as above\par Dx: migraines with intermittent complicated migraines\par Would avoid triptans as her complicated migraines have stroke like features\par

## 2022-11-01 NOTE — PHYSICAL EXAM
[General Appearance - Alert] : alert [General Appearance - In No Acute Distress] : in no acute distress [General Appearance - Well Nourished] : well nourished [General Appearance - Well Developed] : well developed [Oriented To Time, Place, And Person] : oriented to person, place, and time [Impaired Insight] : insight and judgment were intact [Affect] : the affect was normal [Mood] : the mood was normal [Person] : oriented to person [Place] : oriented to place [Fluency] : fluency intact [Current Events] : adequate knowledge of current events [Motor Tone] : muscle tone was normal in all four extremities [Motor Strength] : muscle strength was normal in all four extremities [Sensation Tactile Decrease] : light touch was intact [Sensation Vibration Decrease] : vibration was intact [Abnormal Walk] : normal gait [2+] : Patella left 2+ [Sclera] : the sclera and conjunctiva were normal [Extraocular Movements] : extraocular movements were intact [Neck Appearance] : the appearance of the neck was normal [] : no respiratory distress [Exaggerated Use Of Accessory Muscles For Inspiration] : no accessory muscle use [Edema] : there was no peripheral edema [Involuntary Movements] : no involuntary movements were seen [Musculoskeletal - Swelling] : no joint swelling seen [Motor Strength Upper Extremities Bilaterally] : strength was normal in both upper extremities [Motor Strength Lower Extremities Bilaterally] : strength was normal in both lower extremities

## 2022-11-02 ENCOUNTER — NON-APPOINTMENT (OUTPATIENT)
Age: 42
End: 2022-11-02

## 2022-11-03 ENCOUNTER — EMERGENCY (EMERGENCY)
Facility: HOSPITAL | Age: 42
LOS: 0 days | Discharge: HOME | End: 2022-11-03
Attending: EMERGENCY MEDICINE | Admitting: EMERGENCY MEDICINE

## 2022-11-03 VITALS
WEIGHT: 218.92 LBS | TEMPERATURE: 97 F | SYSTOLIC BLOOD PRESSURE: 131 MMHG | DIASTOLIC BLOOD PRESSURE: 70 MMHG | OXYGEN SATURATION: 100 % | HEART RATE: 79 BPM | RESPIRATION RATE: 18 BRPM

## 2022-11-03 DIAGNOSIS — R10.812 LEFT UPPER QUADRANT ABDOMINAL TENDERNESS: ICD-10-CM

## 2022-11-03 DIAGNOSIS — R19.7 DIARRHEA, UNSPECIFIED: ICD-10-CM

## 2022-11-03 DIAGNOSIS — Z86.711 PERSONAL HISTORY OF PULMONARY EMBOLISM: ICD-10-CM

## 2022-11-03 DIAGNOSIS — Z90.49 ACQUIRED ABSENCE OF OTHER SPECIFIED PARTS OF DIGESTIVE TRACT: ICD-10-CM

## 2022-11-03 DIAGNOSIS — R10.811 RIGHT UPPER QUADRANT ABDOMINAL TENDERNESS: ICD-10-CM

## 2022-11-03 DIAGNOSIS — R11.2 NAUSEA WITH VOMITING, UNSPECIFIED: ICD-10-CM

## 2022-11-03 DIAGNOSIS — Z90.49 ACQUIRED ABSENCE OF OTHER SPECIFIED PARTS OF DIGESTIVE TRACT: Chronic | ICD-10-CM

## 2022-11-03 DIAGNOSIS — R10.9 UNSPECIFIED ABDOMINAL PAIN: ICD-10-CM

## 2022-11-03 DIAGNOSIS — Z86.16 PERSONAL HISTORY OF COVID-19: ICD-10-CM

## 2022-11-03 DIAGNOSIS — Z87.19 PERSONAL HISTORY OF OTHER DISEASES OF THE DIGESTIVE SYSTEM: ICD-10-CM

## 2022-11-03 DIAGNOSIS — Z88.0 ALLERGY STATUS TO PENICILLIN: ICD-10-CM

## 2022-11-03 DIAGNOSIS — Z98.891 HISTORY OF UTERINE SCAR FROM PREVIOUS SURGERY: Chronic | ICD-10-CM

## 2022-11-03 LAB
ALBUMIN SERPL ELPH-MCNC: 4.3 G/DL — SIGNIFICANT CHANGE UP (ref 3.5–5.2)
ALP SERPL-CCNC: 62 U/L — SIGNIFICANT CHANGE UP (ref 30–115)
ALT FLD-CCNC: 16 U/L — SIGNIFICANT CHANGE UP (ref 0–41)
ANION GAP SERPL CALC-SCNC: 8 MMOL/L — SIGNIFICANT CHANGE UP (ref 7–14)
AST SERPL-CCNC: 17 U/L — SIGNIFICANT CHANGE UP (ref 0–41)
BASOPHILS # BLD AUTO: 0.02 K/UL — SIGNIFICANT CHANGE UP (ref 0–0.2)
BASOPHILS NFR BLD AUTO: 0.3 % — SIGNIFICANT CHANGE UP (ref 0–1)
BILIRUB SERPL-MCNC: <0.2 MG/DL — SIGNIFICANT CHANGE UP (ref 0.2–1.2)
BUN SERPL-MCNC: 15 MG/DL — SIGNIFICANT CHANGE UP (ref 10–20)
CALCIUM SERPL-MCNC: 9.4 MG/DL — SIGNIFICANT CHANGE UP (ref 8.4–10.5)
CHLORIDE SERPL-SCNC: 104 MMOL/L — SIGNIFICANT CHANGE UP (ref 98–110)
CO2 SERPL-SCNC: 27 MMOL/L — SIGNIFICANT CHANGE UP (ref 17–32)
CREAT SERPL-MCNC: 0.6 MG/DL — LOW (ref 0.7–1.5)
EGFR: 116 ML/MIN/1.73M2 — SIGNIFICANT CHANGE UP
EOSINOPHIL # BLD AUTO: 0.12 K/UL — SIGNIFICANT CHANGE UP (ref 0–0.7)
EOSINOPHIL NFR BLD AUTO: 2.1 % — SIGNIFICANT CHANGE UP (ref 0–8)
GLUCOSE SERPL-MCNC: 98 MG/DL — SIGNIFICANT CHANGE UP (ref 70–99)
HCT VFR BLD CALC: 36.2 % — LOW (ref 37–47)
HGB BLD-MCNC: 11.9 G/DL — LOW (ref 12–16)
IMM GRANULOCYTES NFR BLD AUTO: 0.3 % — SIGNIFICANT CHANGE UP (ref 0.1–0.3)
LYMPHOCYTES # BLD AUTO: 2.37 K/UL — SIGNIFICANT CHANGE UP (ref 1.2–3.4)
LYMPHOCYTES # BLD AUTO: 40.8 % — SIGNIFICANT CHANGE UP (ref 20.5–51.1)
MCHC RBC-ENTMCNC: 29.7 PG — SIGNIFICANT CHANGE UP (ref 27–31)
MCHC RBC-ENTMCNC: 32.9 G/DL — SIGNIFICANT CHANGE UP (ref 32–37)
MCV RBC AUTO: 90.3 FL — SIGNIFICANT CHANGE UP (ref 81–99)
MONOCYTES # BLD AUTO: 0.4 K/UL — SIGNIFICANT CHANGE UP (ref 0.1–0.6)
MONOCYTES NFR BLD AUTO: 6.9 % — SIGNIFICANT CHANGE UP (ref 1.7–9.3)
NEUTROPHILS # BLD AUTO: 2.88 K/UL — SIGNIFICANT CHANGE UP (ref 1.4–6.5)
NEUTROPHILS NFR BLD AUTO: 49.6 % — SIGNIFICANT CHANGE UP (ref 42.2–75.2)
NRBC # BLD: 0 /100 WBCS — SIGNIFICANT CHANGE UP (ref 0–0)
PLATELET # BLD AUTO: 254 K/UL — SIGNIFICANT CHANGE UP (ref 130–400)
POTASSIUM SERPL-MCNC: 4.3 MMOL/L — SIGNIFICANT CHANGE UP (ref 3.5–5)
POTASSIUM SERPL-SCNC: 4.3 MMOL/L — SIGNIFICANT CHANGE UP (ref 3.5–5)
PROT SERPL-MCNC: 6.8 G/DL — SIGNIFICANT CHANGE UP (ref 6–8)
RBC # BLD: 4.01 M/UL — LOW (ref 4.2–5.4)
RBC # FLD: 12.6 % — SIGNIFICANT CHANGE UP (ref 11.5–14.5)
SODIUM SERPL-SCNC: 139 MMOL/L — SIGNIFICANT CHANGE UP (ref 135–146)
WBC # BLD: 5.81 K/UL — SIGNIFICANT CHANGE UP (ref 4.8–10.8)
WBC # FLD AUTO: 5.81 K/UL — SIGNIFICANT CHANGE UP (ref 4.8–10.8)

## 2022-11-03 PROCEDURE — 99284 EMERGENCY DEPT VISIT MOD MDM: CPT

## 2022-11-03 RX ORDER — RIMEGEPANT SULFATE 75 MG/75MG
75 TABLET, ORALLY DISINTEGRATING ORAL
Qty: 12 | Refills: 5 | Status: ACTIVE | COMMUNITY
Start: 2022-10-31 | End: 1900-01-01

## 2022-11-03 RX ORDER — BUTALBITAL, ACETAMINOPHEN AND CAFFEINE 325; 50; 40 MG/1; MG/1; MG/1
50-325-40 TABLET ORAL
Qty: 15 | Refills: 0 | Status: ACTIVE | COMMUNITY
Start: 2022-11-03 | End: 1900-01-01

## 2022-11-03 RX ORDER — SODIUM CHLORIDE 9 MG/ML
3100 INJECTION, SOLUTION INTRAVENOUS ONCE
Refills: 0 | Status: COMPLETED | OUTPATIENT
Start: 2022-11-03 | End: 2022-11-03

## 2022-11-03 RX ORDER — IBUPROFEN 200 MG
1 TABLET ORAL
Qty: 15 | Refills: 0
Start: 2022-11-03 | End: 2022-11-07

## 2022-11-03 RX ORDER — FAMOTIDINE 10 MG/ML
20 INJECTION INTRAVENOUS ONCE
Refills: 0 | Status: COMPLETED | OUTPATIENT
Start: 2022-11-03 | End: 2022-11-03

## 2022-11-03 RX ORDER — ONDANSETRON 8 MG/1
4 TABLET, FILM COATED ORAL ONCE
Refills: 0 | Status: COMPLETED | OUTPATIENT
Start: 2022-11-03 | End: 2022-11-03

## 2022-11-03 RX ADMIN — SODIUM CHLORIDE 3100 MILLILITER(S): 9 INJECTION, SOLUTION INTRAVENOUS at 21:43

## 2022-11-03 RX ADMIN — Medication 30 MILLILITER(S): at 21:43

## 2022-11-03 RX ADMIN — ONDANSETRON 4 MILLIGRAM(S): 8 TABLET, FILM COATED ORAL at 21:43

## 2022-11-03 RX ADMIN — FAMOTIDINE 20 MILLIGRAM(S): 10 INJECTION INTRAVENOUS at 21:43

## 2022-11-03 NOTE — ED PROVIDER NOTE - PROGRESS NOTE DETAILS
AS: improved s/p meds. Repeat abdominal examinations reveals mild diffuse ttp w/o rebound/guarding. Labs reviewed. No elevated WBC. Afebrile w/ tmax at home of 99. Given cyclic nature of symptoms and overall well appearing, reassuring abdominal examination, the presentation does not appear consistent w/ infectious colitis or diverticulitis. Concern that patient may have underlying irritable bowel. inflammatory bowel seems unlikely as prior colonosocopy was unremarkable. Will rx NSAIDs and recommend close f/u w/ GI. Pt given explicit return precautions if not improving in 48 hours

## 2022-11-03 NOTE — ED PROVIDER NOTE - PHYSICAL EXAMINATION
wd/wn  nad  rrr s1s2 wnl  cta b/l  mmm  + ttp in RUQ and LUQ that is mild  no rebound/guarding  aao X 3  gait stable

## 2022-11-03 NOTE — ED PROVIDER NOTE - OBJECTIVE STATEMENT
42 yo woman w/ hx of recurrent abdominal cramping w/ nausea and diarrhea here w/ 6 days of abdominal cramping, chills, diarrhea, nausea. - vomiting, - fever (Tmax 99). Diarrhea is non-bloody. No recent antibiotics. States very loose stool but only a couple of times/day. States that she has experienced similar symptoms every 2 months or so for the last couple of years. In past has had CT imaging which did not demonstrate surgical pathology. Has been given abx in past for this but unclear of the exact diagnosis. 40 yo woman w/ hx of recurrent abdominal cramping w/ nausea and diarrhea here w/ 6 days of abdominal cramping, chills, diarrhea, nausea. - vomiting, - fever (Tmax 99). Diarrhea is non-bloody. No recent antibiotics. States very loose stool but only a couple of times/day. States that she has experienced similar symptoms every 2 months or so for the last couple of years. Has hx of diverticulitis and of colitis (last colitis in July 2022). No abdominal surgeries in past  Has seen GI in past w/ colonscopy that was unrevealing    NKDA

## 2022-11-03 NOTE — ED PROVIDER NOTE - PATIENT PORTAL LINK FT
You can access the FollowMyHealth Patient Portal offered by [facility] by registering at the following website: [email or web address]. By joining [employer]’s FollowMyHealth portal, you will also be able to view your health information using other applications (apps) compatible with our system.

## 2022-11-03 NOTE — ED PROVIDER NOTE - CLINICAL SUMMARY MEDICAL DECISION MAKING FREE TEXT BOX
42 yo woman w/ hx of diverticulitis and colitis p/w abdominal cramping, nausea and diarrhea X 6 days. No fevers. Abdominal examination is reassuring for absence of surgical pathology  Will check labs, GI meds, zofran, fluids and reassess  Likely d/c home w/ cipro flagyl and outpt GI f/u  At this time, advanced imaging not necessary but will perform repeat abdominal examinations

## 2022-11-13 ENCOUNTER — NON-APPOINTMENT (OUTPATIENT)
Age: 42
End: 2022-11-13

## 2023-01-08 ENCOUNTER — NON-APPOINTMENT (OUTPATIENT)
Age: 43
End: 2023-01-08

## 2023-02-26 ENCOUNTER — EMERGENCY (EMERGENCY)
Facility: HOSPITAL | Age: 43
LOS: 0 days | Discharge: HOME | End: 2023-02-27
Attending: EMERGENCY MEDICINE
Payer: MEDICAID

## 2023-02-26 VITALS
HEIGHT: 69 IN | TEMPERATURE: 99 F | HEART RATE: 78 BPM | RESPIRATION RATE: 24 BRPM | SYSTOLIC BLOOD PRESSURE: 144 MMHG | WEIGHT: 190.04 LBS | OXYGEN SATURATION: 100 % | DIASTOLIC BLOOD PRESSURE: 88 MMHG
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
## 2023-02-26 DIAGNOSIS — S70.10XA CONTUSION OF UNSPECIFIED THIGH, INITIAL ENCOUNTER: ICD-10-CM

## 2023-02-26 DIAGNOSIS — Z86.711 PERSONAL HISTORY OF PULMONARY EMBOLISM: ICD-10-CM

## 2023-02-26 DIAGNOSIS — Z86.16 PERSONAL HISTORY OF COVID-19: ICD-10-CM

## 2023-02-26 DIAGNOSIS — Z90.49 ACQUIRED ABSENCE OF OTHER SPECIFIED PARTS OF DIGESTIVE TRACT: ICD-10-CM

## 2023-02-26 DIAGNOSIS — Z88.0 ALLERGY STATUS TO PENICILLIN: ICD-10-CM

## 2023-02-26 DIAGNOSIS — Z88.5 ALLERGY STATUS TO NARCOTIC AGENT: ICD-10-CM

## 2023-02-26 DIAGNOSIS — R06.02 SHORTNESS OF BREATH: ICD-10-CM

## 2023-02-26 DIAGNOSIS — Z87.19 PERSONAL HISTORY OF OTHER DISEASES OF THE DIGESTIVE SYSTEM: ICD-10-CM

## 2023-02-26 DIAGNOSIS — Z90.49 ACQUIRED ABSENCE OF OTHER SPECIFIED PARTS OF DIGESTIVE TRACT: Chronic | ICD-10-CM

## 2023-02-26 DIAGNOSIS — R10.9 UNSPECIFIED ABDOMINAL PAIN: ICD-10-CM

## 2023-02-26 DIAGNOSIS — Z98.891 HISTORY OF UTERINE SCAR FROM PREVIOUS SURGERY: Chronic | ICD-10-CM
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
## 2023-02-26 LAB
BASOPHILS # BLD AUTO: 0.03 K/UL — SIGNIFICANT CHANGE UP (ref 0–0.2)
BASOPHILS NFR BLD AUTO: 0.4 % — SIGNIFICANT CHANGE UP (ref 0–1)
EOSINOPHIL # BLD AUTO: 0.14 K/UL — SIGNIFICANT CHANGE UP (ref 0–0.7)
EOSINOPHIL NFR BLD AUTO: 1.9 % — SIGNIFICANT CHANGE UP (ref 0–8)
HCT VFR BLD CALC: 36.8 % — LOW (ref 37–47)
HGB BLD-MCNC: 12.1 G/DL — SIGNIFICANT CHANGE UP (ref 12–16)
IMM GRANULOCYTES NFR BLD AUTO: 0.4 % — HIGH (ref 0.1–0.3)
LYMPHOCYTES # BLD AUTO: 2.79 K/UL — SIGNIFICANT CHANGE UP (ref 1.2–3.4)
LYMPHOCYTES # BLD AUTO: 37.8 % — SIGNIFICANT CHANGE UP (ref 20.5–51.1)
MCHC RBC-ENTMCNC: 29.7 PG — SIGNIFICANT CHANGE UP (ref 27–31)
MCHC RBC-ENTMCNC: 32.9 G/DL — SIGNIFICANT CHANGE UP (ref 32–37)
MCV RBC AUTO: 90.2 FL — SIGNIFICANT CHANGE UP (ref 81–99)
MONOCYTES # BLD AUTO: 0.62 K/UL — HIGH (ref 0.1–0.6)
MONOCYTES NFR BLD AUTO: 8.4 % — SIGNIFICANT CHANGE UP (ref 1.7–9.3)
NEUTROPHILS # BLD AUTO: 3.77 K/UL — SIGNIFICANT CHANGE UP (ref 1.4–6.5)
NEUTROPHILS NFR BLD AUTO: 51.1 % — SIGNIFICANT CHANGE UP (ref 42.2–75.2)
NRBC # BLD: 0 /100 WBCS — SIGNIFICANT CHANGE UP (ref 0–0)
PLATELET # BLD AUTO: 276 K/UL — SIGNIFICANT CHANGE UP (ref 130–400)
RBC # BLD: 4.08 M/UL — LOW (ref 4.2–5.4)
RBC # FLD: 12.3 % — SIGNIFICANT CHANGE UP (ref 11.5–14.5)
WBC # BLD: 7.38 K/UL — SIGNIFICANT CHANGE UP (ref 4.8–10.8)
WBC # FLD AUTO: 7.38 K/UL — SIGNIFICANT CHANGE UP (ref 4.8–10.8)

## 2023-02-26 PROCEDURE — 83880 ASSAY OF NATRIURETIC PEPTIDE: CPT

## 2023-02-26 PROCEDURE — 71045 X-RAY EXAM CHEST 1 VIEW: CPT

## 2023-02-26 PROCEDURE — 36415 COLL VENOUS BLD VENIPUNCTURE: CPT

## 2023-02-26 PROCEDURE — 85025 COMPLETE CBC W/AUTO DIFF WBC: CPT

## 2023-02-26 PROCEDURE — 83690 ASSAY OF LIPASE: CPT

## 2023-02-26 PROCEDURE — 74177 CT ABD & PELVIS W/CONTRAST: CPT | Mod: MA

## 2023-02-26 PROCEDURE — 99285 EMERGENCY DEPT VISIT HI MDM: CPT

## 2023-02-26 PROCEDURE — 70450 CT HEAD/BRAIN W/O DYE: CPT | Mod: MA

## 2023-02-26 PROCEDURE — 80053 COMPREHEN METABOLIC PANEL: CPT

## 2023-02-26 PROCEDURE — 85730 THROMBOPLASTIN TIME PARTIAL: CPT

## 2023-02-26 PROCEDURE — 84703 CHORIONIC GONADOTROPIN ASSAY: CPT

## 2023-02-26 PROCEDURE — 93005 ELECTROCARDIOGRAM TRACING: CPT

## 2023-02-26 PROCEDURE — 93010 ELECTROCARDIOGRAM REPORT: CPT

## 2023-02-26 PROCEDURE — 84484 ASSAY OF TROPONIN QUANT: CPT

## 2023-02-26 PROCEDURE — 83605 ASSAY OF LACTIC ACID: CPT

## 2023-02-26 PROCEDURE — 85610 PROTHROMBIN TIME: CPT

## 2023-02-26 PROCEDURE — 83735 ASSAY OF MAGNESIUM: CPT

## 2023-02-26 NOTE — ED ADULT TRIAGE NOTE - AS TEMP SITE
Pt Name: Alexandru Thompson  MRN: [id number]  [place] 2015  Age/Sex: 10 y o  male  Date of evaluation: 3/25/2022  PCP: Isaias Echeverria, [address] [place] [address]    Chief Complaint   Patient presents with    Cough     Per mom cough since yest, worsening overnight  HPI and MDM    10 y o  male presenting with cough  Cough started yesterday, today in school worsen  Therefore patient was sent to the emergency department  No fevers or chills, no sore throat worsen known sick contacts  Up-to-date with shots  Eating and drinking well  No medical problems  No rash  Currently patient is feeling well  No pain anywhere  No shortness of breath  Cough is nonproductive  Patient appears well, not requiring any supplemental oxygen, not toxic appearing  Not in acute distress  Afebrile  Doubt pneumonia, lungs clear to auscultation in all fields  Currently patient not symptomatic  Will obtain flu/COVID/RSV swab  Updated mom on results  Supportive care at home  PCP follow-up  Return precautions discussed  She verbalized understanding and is in agreement with plan  Medications - No data to display      Past Medical and Surgical History    History reviewed  No pertinent past medical history  History reviewed  No pertinent surgical history  History reviewed  No pertinent family history  Social History     Tobacco Use    Smoking status: Never Smoker    Smokeless tobacco: Never Used   Substance Use Topics    Alcohol use: Not on file    Drug use: Not on file           Allergies    No Known Allergies    Home Medications    Prior to Admission medications    Medication Sig Start Date End Date Taking?  Authorizing Provider   acetaminophen (TYLENOL) 160 mg/5 mL suspension Take 9 8 mL (313 6 mg total) by mouth every 4 (four) hours as needed for mild pain or fever 10/11/21   Manny Perez MD   ibuprofen (MOTRIN) 100 mg/5 mL suspension Take 10 5 mL (210 mg total) by mouth every 6 (six) hours as needed for mild pain or fever 10/11/21   Laron Veloz MD           Review of Systems    Review of Systems   Constitutional: Negative for chills and fever  HENT: Negative for ear pain and sore throat  Eyes: Negative for pain and visual disturbance  Respiratory: Positive for cough  Negative for shortness of breath  Cardiovascular: Negative for chest pain and leg swelling  Gastrointestinal: Negative for abdominal pain and nausea  Genitourinary: Negative for dysuria and hematuria  Musculoskeletal: Negative for back pain and gait problem  Skin: Negative for color change and rash  Neurological: Negative for seizures and syncope  All other systems reviewed and are negative  Physical Exam      ED Triage Vitals [03/25/22 1058]   Temperature Pulse Respirations Blood Pressure SpO2   98 6 °F (37 °C) 84 22 (!) 115/52 98 %      Temp src Heart Rate Source Patient Position - Orthostatic VS BP Location FiO2 (%)   Oral Monitor Sitting Left arm --      Pain Score       No Pain               Physical Exam  Constitutional:       General: He is active  Appearance: Normal appearance  He is well-developed  HENT:      Head: Normocephalic and atraumatic  Nose: Nose normal       Mouth/Throat:      Mouth: Mucous membranes are moist       Pharynx: No oropharyngeal exudate or posterior oropharyngeal erythema  Eyes:      Extraocular Movements: Extraocular movements intact  Pupils: Pupils are equal, round, and reactive to light  Cardiovascular:      Rate and Rhythm: Normal rate and regular rhythm  Heart sounds: No murmur heard  Pulmonary:      Effort: Pulmonary effort is normal       Breath sounds: Normal breath sounds  Abdominal:      General: Abdomen is flat  There is no distension  Palpations: Abdomen is soft  There is no mass  Tenderness: There is no abdominal tenderness  Musculoskeletal:         General: No swelling or tenderness  Normal range of motion  Cervical back: Normal range of motion and neck supple  Skin:     General: Skin is warm  Capillary Refill: Capillary refill takes less than 2 seconds  Coloration: Skin is not cyanotic  Findings: No erythema, petechiae or rash  Neurological:      General: No focal deficit present  Mental Status: He is alert and oriented for age  Diagnostic Results      Labs:    Results Reviewed     Procedure Component Value Units Date/Time    COVID/FLU/RSV - 2 hour TAT [[id number]]  (Normal) Collected: 03/25/22 1115    Lab Status: Final result Specimen: Nares from Nose Updated: 03/25/22 1157     SARS-CoV-2 Negative     INFLUENZA A PCR Negative     INFLUENZA B PCR Negative     RSV PCR Negative    Narrative:      FOR PEDIATRIC PATIENTS - copy/paste COVID Guidelines URL to browser: [email or web address]  [employer]    SARS-CoV-2 assay is a Nucleic Acid Amplification assay intended for the  qualitative detection of nucleic acid from SARS-CoV-2 in nasopharyngeal  swabs  Results are for the presumptive identification of SARS-CoV-2 RNA  Positive results are indicative of infection with SARS-CoV-2, the virus  causing COVID-19, but do not rule out bacterial infection or co-infection  with other viruses  Laboratories within the [place] and its  territories are required to report all positive results to the appropriate  public health authorities  Negative results do not preclude SARS-CoV-2  infection and should not be used as the sole basis for treatment or other  patient management decisions  Negative results must be combined with  clinical observations, patient history, and epidemiological information  This test has not been FDA cleared or approved  This test has been authorized by FDA under an Emergency Use Authorization  (EUA)   This test is only authorized for the duration of time the  declaration that circumstances exist justifying the authorization of the  emergency use of an in vitro diagnostic tests for detection of SARS-CoV-2  virus and/or diagnosis of COVID-19 infection under section 564(b)(1) of  the Act, 21 U  S C  254BJY-0(E)(1), unless the authorization is terminated  or revoked sooner  The test has been validated but independent review by FDA  and CLIA is pending  Test performed using [employer] GeneXpert: This RT-PCR assay targets N2,  a region unique to SARS-CoV-2  A conserved region in the E-gene was chosen  for pan-Sarbecovirus detection which includes SARS-CoV-2  All labs reviewed and utilized in the medical decision making process    Radiology:    No orders to display       All radiology studies independently viewed by me and interpreted by the radiologist     Procedure    Procedures        FINAL IMPRESSION    Final diagnoses:   Cough         DISPOSITION    Time reflects when diagnosis was documented in both MDM as applicable and the Disposition within this note     Time User Action Codes Description Comment    3/25/2022 11:10 AM Bruce Bhandari [R05 9] Cough       ED Disposition     ED Disposition Condition Date/Time Comment    Discharge Stable Fri Mar 25, 2022 11:10 AM Carin Hoffman discharge to home/self care              Follow-up Information     Follow up With Specialties Details Why [address], DO Family Medicine Schedule an appointment as soon as possible for a visit in 1 week  [address] [facility]  [phone]              [facility] [address]  [phone]    Schedule an appointment as soon as possible for a visit in 1 week        DISCHARGE MEDICATIONS:    Discharge Medication List as of 3/25/2022 11:11 AM      CONTINUE these medications which have NOT CHANGED    Details   acetaminophen (TYLENOL) 160 mg/5 mL suspension Take 9 8 mL (313 6 mg total) by mouth every 4 (four) hours as needed for mild pain or fever, Starting Mon 10/11/2021, Normal      ibuprofen (MOTRIN) 100 mg/5 mL suspension Take 10 5 mL (210 mg total) by mouth every 6 (six) hours as needed for mild pain or fever, Starting Mon 10/11/2021, Normal             No discharge procedures on file  Dae Fajardo DO        This note was partially completed using voice recognition technology, and was scanned for gross errors; however some errors may still exist  Please contact the author with any questions or requests for clarification        Dae Fajardo DO  03/25/22 8099 oral

## 2023-02-27 LAB
ALBUMIN SERPL ELPH-MCNC: 4.1 G/DL — SIGNIFICANT CHANGE UP (ref 3.5–5.2)
ALP SERPL-CCNC: 66 U/L — SIGNIFICANT CHANGE UP (ref 30–115)
ALT FLD-CCNC: 14 U/L — SIGNIFICANT CHANGE UP (ref 0–41)
ANION GAP SERPL CALC-SCNC: 12 MMOL/L — SIGNIFICANT CHANGE UP (ref 7–14)
APTT BLD: 28.9 SEC — SIGNIFICANT CHANGE UP (ref 27–39.2)
AST SERPL-CCNC: 16 U/L — SIGNIFICANT CHANGE UP (ref 0–41)
BILIRUB SERPL-MCNC: <0.2 MG/DL — SIGNIFICANT CHANGE UP (ref 0.2–1.2)
BUN SERPL-MCNC: 11 MG/DL — SIGNIFICANT CHANGE UP (ref 10–20)
CALCIUM SERPL-MCNC: 9.3 MG/DL — SIGNIFICANT CHANGE UP (ref 8.4–10.5)
CHLORIDE SERPL-SCNC: 103 MMOL/L — SIGNIFICANT CHANGE UP (ref 98–110)
CO2 SERPL-SCNC: 24 MMOL/L — SIGNIFICANT CHANGE UP (ref 17–32)
CREAT SERPL-MCNC: 0.6 MG/DL — LOW (ref 0.7–1.5)
EGFR: 115 ML/MIN/1.73M2 — SIGNIFICANT CHANGE UP
GLUCOSE SERPL-MCNC: 122 MG/DL — HIGH (ref 70–99)
HCG SERPL QL: NEGATIVE — SIGNIFICANT CHANGE UP
INR BLD: 0.91 RATIO — SIGNIFICANT CHANGE UP (ref 0.65–1.3)
LACTATE SERPL-SCNC: 1.4 MMOL/L — SIGNIFICANT CHANGE UP (ref 0.7–2)
LIDOCAIN IGE QN: 29 U/L — SIGNIFICANT CHANGE UP (ref 7–60)
MAGNESIUM SERPL-MCNC: 2 MG/DL — SIGNIFICANT CHANGE UP (ref 1.8–2.4)
NT-PROBNP SERPL-SCNC: 48 PG/ML — SIGNIFICANT CHANGE UP (ref 0–300)
POTASSIUM SERPL-MCNC: 3.7 MMOL/L — SIGNIFICANT CHANGE UP (ref 3.5–5)
POTASSIUM SERPL-SCNC: 3.7 MMOL/L — SIGNIFICANT CHANGE UP (ref 3.5–5)
PROT SERPL-MCNC: 6.6 G/DL — SIGNIFICANT CHANGE UP (ref 6–8)
PROTHROM AB SERPL-ACNC: 10.4 SEC — SIGNIFICANT CHANGE UP (ref 9.95–12.87)
SODIUM SERPL-SCNC: 139 MMOL/L — SIGNIFICANT CHANGE UP (ref 135–146)
TROPONIN T SERPL-MCNC: <0.01 NG/ML — SIGNIFICANT CHANGE UP

## 2023-02-27 PROCEDURE — 74177 CT ABD & PELVIS W/CONTRAST: CPT | Mod: 26,MA

## 2023-02-27 PROCEDURE — 70450 CT HEAD/BRAIN W/O DYE: CPT | Mod: 26,MA

## 2023-02-27 PROCEDURE — 71045 X-RAY EXAM CHEST 1 VIEW: CPT | Mod: 26

## 2023-02-27 RX ORDER — KETOROLAC TROMETHAMINE 30 MG/ML
15 SYRINGE (ML) INJECTION ONCE
Refills: 0 | Status: DISCONTINUED | OUTPATIENT
Start: 2023-02-27 | End: 2023-02-27

## 2023-02-27 NOTE — ED PROVIDER NOTE - CLINICAL SUMMARY MEDICAL DECISION MAKING FREE TEXT BOX
43 yo F, hx as noted, presents to the ED for assessment of abdominal pain, dyspnea, thigh bruising and an episode of shaking described as a seizure.     On arrival, patient anxious, in no distress, has normal VS, clear lungs, RRR, soft, NT, ND abdomen, has small bruises to bilateral legs, 1 to upper mid thigh, one to anterior shin, no calf ttp, swelling.    While being assessed, patient had episode of arm flailing and head bobbing, lasting about 10 seconds. She was conscious throughout, had no tongue biting or incontinence. Episode not consistent with GTC seizure.     The patient had normal labs, CT head, CT abdomen and pelvis. She was observed, had no further episodes, continues to have normal VS.    She has no signs of acute intracranial, intraabdominal injury. No electrolyte abnormalities. Has normal platelets, no other signs of coagulopathy.    Will dc home with continued monitoring, return precautions, follow up.

## 2023-02-27 NOTE — ED PROVIDER NOTE - PATIENT PORTAL LINK FT
You can access the FollowMyHealth Patient Portal offered by [facility] by registering at the following website: [email or web address]. By joining [employer]’s FollowMyHealth portal, you will also be able to view your health information using other applications (apps) compatible with our system.

## 2023-02-27 NOTE — ED PROVIDER NOTE - NS ED ATTENDING STATEMENT MOD
This was a shared visit with the KWAN. I reviewed and verified the documentation and independently performed the documented:

## 2023-02-27 NOTE — ED PROVIDER NOTE - PHYSICAL EXAMINATION
VITAL SIGNS: I have reviewed nursing notes and confirm.  CONSTITUTIONAL: Well-developed; well-nourished; in no acute distress.  SKIN: Skin exam is warm and dry, no acute rash.  HEAD: Normocephalic; atraumatic.  EYES: PERRL, EOM intact; conjunctiva and sclera clear.  ENT: No nasal discharge; airway clear. TMs clear.  NECK: Supple; non tender.  CARD: S1, S2 normal; no murmurs, gallops, or rubs. Regular rate and rhythm.  RESP: No wheezes, rales or rhonchi. Speaking in full sentences.   ABD: Normal bowel sounds; soft; non-distended; non-tender; No rebound or guarding. No CVA tenderness.  EXT: Normal ROM. No clubbing, cyanosis or edema. No calf TTP or swelling. (+) small bruises to B/L anterior aspect of thighs.   NEURO: Alert, oriented. Grossly unremarkable. No focal deficits. CN II-XII intact. No dysmetria. No ataxia. Sensation intact and equal throughout. Strength 5/5 throughout. Gait steady.

## 2023-02-27 NOTE — ED PROVIDER NOTE - CARE PLAN
1 Principal Discharge DX:	Shortness of breath  Secondary Diagnosis:	Abdominal pain  Secondary Diagnosis:	Seizure-like activity

## 2023-02-27 NOTE — ED PROVIDER NOTE - NSFOLLOWUPINSTRUCTIONS_ED_ALL_ED_FT
Shortness of breath    Shortness of breath (dyspnea) means you have trouble breathing and could indicate a medical problem. Causes include lung disease, heart disease, low amount of red blood cells (anemia), poor physical fitness, being overweight, smoking, etc. Your health care provider today may not be able to find a cause for your shortness of breath after your exam. In this case, it is important to have a follow-up exam with your primary care physician as instructed. If medicines were prescribed, take them as directed for the full length of time directed. Refrain from tobacco products.    SEEK IMMEDIATE MEDICAL CARE IF YOU HAVE ANY OF THE FOLLOWING SYMPTOMS: worsening shortness of breath, chest pain, back pain, abdominal pain, fever, coughing up blood, lightheadedness/dizziness.     Abdominal Pain    Many things can cause abdominal pain. Many times, abdominal pain is not caused by a disease and will improve without treatment. Your health care provider will do a physical exam to determine if there is a dangerous cause of your pain; blood tests and imaging may help determine the cause of your pain. However, in many cases, no cause may be found and you may need further testing as an outpatient. Monitor your abdominal pain for any changes.     SEEK IMMEDIATE MEDICAL CARE IF YOU HAVE ANY OF THE FOLLOWING SYMPTOMS: worsening abdominal pain, uncontrollable vomiting, profuse diarrhea, inability to have bowel movements or pass gas, black or bloody stools, fever accompanying chest pain or back pain, or fainting. These symptoms may represent a serious problem that is an emergency. Do not wait to see if the symptoms will go away. Get medical help right away. Call 911 and do not drive yourself to the hospital.

## 2023-02-27 NOTE — ED PROVIDER NOTE - OBJECTIVE STATEMENT
42-year-old female with past medical history of diverticulitis, PE no longer on AC, colitis, and pseudoseizure?  Presents to the ED for evaluation of seizure-like activity at home, diffuse abdominal pain, shortness of breath, and bruising to her thighs.   states that he saw patient's whole body jerking at home, called EMS.  Episode lasted about 30 seconds and resolved spontaneously.  In ED patient had another episode upon evaluation she had rhythmic jerking of the limbs/head but was conscious.   states she had similar episode at home.  No incontinence or tongue biting occurred.  Patient denies other complaints. Pt denies fever, chills, nausea, vomiting, diarrhea, headache, dizziness, weakness, chest pain, back pain, trauma, urinary symptoms, cough, calf pain/swelling, recent travel, recent surgery.

## 2023-03-08 ENCOUNTER — NON-APPOINTMENT (OUTPATIENT)
Age: 43
End: 2023-03-08

## 2023-03-22 ENCOUNTER — RX RENEWAL (OUTPATIENT)
Age: 43
End: 2023-03-22

## 2023-03-22 RX ORDER — ERENUMAB-AOOE 70 MG/ML
70 INJECTION SUBCUTANEOUS
Qty: 3 | Refills: 4 | Status: ACTIVE | COMMUNITY
Start: 2021-10-01 | End: 1900-01-01

## 2023-04-18 ENCOUNTER — NON-APPOINTMENT (OUTPATIENT)
Age: 43
End: 2023-04-18

## 2023-04-20 NOTE — ED PROVIDER NOTE - NSCAREINITIATED _GEN_ER
Rozina Mays(Attending) Opzelura Counseling:  I discussed with the patient the risks of Opzelura including but not limited to nasopharngitis, bronchitis, ear infection, eosinophila, hives, diarrhea, folliculitis, tonsillitis, and rhinorrhea.  Taken orally, this medication has been linked to serious infections; higher rate of mortality; malignancy and lymphoproliferative disorders; major adverse cardiovascular events; thrombosis; thrombocytopenia, anemia, and neutropenia; and lipid elevations.

## 2023-05-24 ENCOUNTER — EMERGENCY (EMERGENCY)
Facility: HOSPITAL | Age: 43
LOS: 0 days | Discharge: HOME | End: 2023-05-25
Attending: STUDENT IN AN ORGANIZED HEALTH CARE EDUCATION/TRAINING PROGRAM
Payer: MEDICAID

## 2023-05-24 VITALS
RESPIRATION RATE: 16 BRPM | HEIGHT: 70 IN | SYSTOLIC BLOOD PRESSURE: 138 MMHG | HEART RATE: 87 BPM | TEMPERATURE: 96 F | OXYGEN SATURATION: 99 % | WEIGHT: 199.08 LBS | DIASTOLIC BLOOD PRESSURE: 78 MMHG

## 2023-05-24 DIAGNOSIS — Z87.19 PERSONAL HISTORY OF OTHER DISEASES OF THE DIGESTIVE SYSTEM: ICD-10-CM

## 2023-05-24 DIAGNOSIS — Z88.0 ALLERGY STATUS TO PENICILLIN: ICD-10-CM

## 2023-05-24 DIAGNOSIS — Z86.711 PERSONAL HISTORY OF PULMONARY EMBOLISM: ICD-10-CM

## 2023-05-24 DIAGNOSIS — Z88.5 ALLERGY STATUS TO NARCOTIC AGENT: ICD-10-CM

## 2023-05-24 DIAGNOSIS — Z98.891 HISTORY OF UTERINE SCAR FROM PREVIOUS SURGERY: Chronic | ICD-10-CM

## 2023-05-24 DIAGNOSIS — N20.0 CALCULUS OF KIDNEY: ICD-10-CM

## 2023-05-24 DIAGNOSIS — Z90.49 ACQUIRED ABSENCE OF OTHER SPECIFIED PARTS OF DIGESTIVE TRACT: Chronic | ICD-10-CM

## 2023-05-24 DIAGNOSIS — R10.9 UNSPECIFIED ABDOMINAL PAIN: ICD-10-CM

## 2023-05-24 DIAGNOSIS — Z86.16 PERSONAL HISTORY OF COVID-19: ICD-10-CM

## 2023-05-24 DIAGNOSIS — Z90.49 ACQUIRED ABSENCE OF OTHER SPECIFIED PARTS OF DIGESTIVE TRACT: ICD-10-CM

## 2023-05-24 LAB
ALBUMIN SERPL ELPH-MCNC: 4.4 G/DL — SIGNIFICANT CHANGE UP (ref 3.5–5.2)
ALP SERPL-CCNC: 66 U/L — SIGNIFICANT CHANGE UP (ref 30–115)
ALT FLD-CCNC: 13 U/L — SIGNIFICANT CHANGE UP (ref 0–41)
ANION GAP SERPL CALC-SCNC: 10 MMOL/L — SIGNIFICANT CHANGE UP (ref 7–14)
APPEARANCE UR: CLEAR — SIGNIFICANT CHANGE UP
APTT BLD: 30.4 SEC — SIGNIFICANT CHANGE UP (ref 27–39.2)
AST SERPL-CCNC: 18 U/L — SIGNIFICANT CHANGE UP (ref 0–41)
BACTERIA # UR AUTO: ABNORMAL /HPF
BASOPHILS # BLD AUTO: 0.03 K/UL — SIGNIFICANT CHANGE UP (ref 0–0.2)
BASOPHILS NFR BLD AUTO: 0.5 % — SIGNIFICANT CHANGE UP (ref 0–1)
BILIRUB SERPL-MCNC: <0.2 MG/DL — SIGNIFICANT CHANGE UP (ref 0.2–1.2)
BILIRUB UR-MCNC: NEGATIVE — SIGNIFICANT CHANGE UP
BUN SERPL-MCNC: 13 MG/DL — SIGNIFICANT CHANGE UP (ref 10–20)
CALCIUM SERPL-MCNC: 9.4 MG/DL — SIGNIFICANT CHANGE UP (ref 8.4–10.5)
CHLORIDE SERPL-SCNC: 103 MMOL/L — SIGNIFICANT CHANGE UP (ref 98–110)
CO2 SERPL-SCNC: 25 MMOL/L — SIGNIFICANT CHANGE UP (ref 17–32)
COD CRY URNS QL: NEGATIVE — SIGNIFICANT CHANGE UP
COLOR SPEC: YELLOW — SIGNIFICANT CHANGE UP
CREAT SERPL-MCNC: 0.7 MG/DL — SIGNIFICANT CHANGE UP (ref 0.7–1.5)
DIFF PNL FLD: ABNORMAL
EGFR: 111 ML/MIN/1.73M2 — SIGNIFICANT CHANGE UP
EOSINOPHIL # BLD AUTO: 0.09 K/UL — SIGNIFICANT CHANGE UP (ref 0–0.7)
EOSINOPHIL NFR BLD AUTO: 1.4 % — SIGNIFICANT CHANGE UP (ref 0–8)
EPI CELLS # UR: ABNORMAL /HPF (ref 0–5)
GLUCOSE SERPL-MCNC: 99 MG/DL — SIGNIFICANT CHANGE UP (ref 70–99)
GLUCOSE UR QL: NEGATIVE MG/DL — SIGNIFICANT CHANGE UP
GRAN CASTS # UR COMP ASSIST: NEGATIVE — SIGNIFICANT CHANGE UP
HCG SERPL QL: NEGATIVE — SIGNIFICANT CHANGE UP
HCT VFR BLD CALC: 37.2 % — SIGNIFICANT CHANGE UP (ref 37–47)
HGB BLD-MCNC: 12.3 G/DL — SIGNIFICANT CHANGE UP (ref 12–16)
HYALINE CASTS # UR AUTO: NEGATIVE — SIGNIFICANT CHANGE UP
IMM GRANULOCYTES NFR BLD AUTO: 0.5 % — HIGH (ref 0.1–0.3)
INR BLD: 1.01 RATIO — SIGNIFICANT CHANGE UP (ref 0.65–1.3)
KETONES UR-MCNC: NEGATIVE — SIGNIFICANT CHANGE UP
LEUKOCYTE ESTERASE UR-ACNC: NEGATIVE — SIGNIFICANT CHANGE UP
LYMPHOCYTES # BLD AUTO: 1.98 K/UL — SIGNIFICANT CHANGE UP (ref 1.2–3.4)
LYMPHOCYTES # BLD AUTO: 30.1 % — SIGNIFICANT CHANGE UP (ref 20.5–51.1)
MCHC RBC-ENTMCNC: 29.6 PG — SIGNIFICANT CHANGE UP (ref 27–31)
MCHC RBC-ENTMCNC: 33.1 G/DL — SIGNIFICANT CHANGE UP (ref 32–37)
MCV RBC AUTO: 89.6 FL — SIGNIFICANT CHANGE UP (ref 81–99)
MONOCYTES # BLD AUTO: 0.51 K/UL — SIGNIFICANT CHANGE UP (ref 0.1–0.6)
MONOCYTES NFR BLD AUTO: 7.8 % — SIGNIFICANT CHANGE UP (ref 1.7–9.3)
NEUTROPHILS # BLD AUTO: 3.94 K/UL — SIGNIFICANT CHANGE UP (ref 1.4–6.5)
NEUTROPHILS NFR BLD AUTO: 59.7 % — SIGNIFICANT CHANGE UP (ref 42.2–75.2)
NITRITE UR-MCNC: NEGATIVE — SIGNIFICANT CHANGE UP
NRBC # BLD: 0 /100 WBCS — SIGNIFICANT CHANGE UP (ref 0–0)
PH UR: 5.5 — SIGNIFICANT CHANGE UP (ref 5–8)
PLATELET # BLD AUTO: 270 K/UL — SIGNIFICANT CHANGE UP (ref 130–400)
PMV BLD: 10 FL — SIGNIFICANT CHANGE UP (ref 7.4–10.4)
POTASSIUM SERPL-MCNC: 4.2 MMOL/L — SIGNIFICANT CHANGE UP (ref 3.5–5)
POTASSIUM SERPL-SCNC: 4.2 MMOL/L — SIGNIFICANT CHANGE UP (ref 3.5–5)
PROT SERPL-MCNC: 6.8 G/DL — SIGNIFICANT CHANGE UP (ref 6–8)
PROT UR-MCNC: NEGATIVE MG/DL — SIGNIFICANT CHANGE UP
PROTHROM AB SERPL-ACNC: 11.5 SEC — SIGNIFICANT CHANGE UP (ref 9.95–12.87)
RBC # BLD: 4.15 M/UL — LOW (ref 4.2–5.4)
RBC # FLD: 12.3 % — SIGNIFICANT CHANGE UP (ref 11.5–14.5)
RBC CASTS # UR COMP ASSIST: ABNORMAL /HPF (ref 0–4)
SODIUM SERPL-SCNC: 138 MMOL/L — SIGNIFICANT CHANGE UP (ref 135–146)
SP GR SPEC: 1.02 — SIGNIFICANT CHANGE UP (ref 1.01–1.03)
TRI-PHOS CRY UR QL COMP ASSIST: NEGATIVE — SIGNIFICANT CHANGE UP
URATE CRY FLD QL MICRO: NEGATIVE — SIGNIFICANT CHANGE UP
UROBILINOGEN FLD QL: 0.2 MG/DL — SIGNIFICANT CHANGE UP
WBC # BLD: 6.58 K/UL — SIGNIFICANT CHANGE UP (ref 4.8–10.8)
WBC # FLD AUTO: 6.58 K/UL — SIGNIFICANT CHANGE UP (ref 4.8–10.8)
WBC UR QL: SIGNIFICANT CHANGE UP /HPF (ref 0–5)

## 2023-05-24 PROCEDURE — 85730 THROMBOPLASTIN TIME PARTIAL: CPT

## 2023-05-24 PROCEDURE — 74177 CT ABD & PELVIS W/CONTRAST: CPT | Mod: MA

## 2023-05-24 PROCEDURE — 80053 COMPREHEN METABOLIC PANEL: CPT

## 2023-05-24 PROCEDURE — 99284 EMERGENCY DEPT VISIT MOD MDM: CPT | Mod: 25

## 2023-05-24 PROCEDURE — 99284 EMERGENCY DEPT VISIT MOD MDM: CPT

## 2023-05-24 PROCEDURE — 81001 URINALYSIS AUTO W/SCOPE: CPT

## 2023-05-24 PROCEDURE — 85610 PROTHROMBIN TIME: CPT

## 2023-05-24 PROCEDURE — 36415 COLL VENOUS BLD VENIPUNCTURE: CPT

## 2023-05-24 PROCEDURE — 85025 COMPLETE CBC W/AUTO DIFF WBC: CPT

## 2023-05-24 PROCEDURE — 96374 THER/PROPH/DIAG INJ IV PUSH: CPT | Mod: XU

## 2023-05-24 PROCEDURE — 74177 CT ABD & PELVIS W/CONTRAST: CPT | Mod: 26,MA

## 2023-05-24 PROCEDURE — 84703 CHORIONIC GONADOTROPIN ASSAY: CPT

## 2023-05-24 PROCEDURE — 96375 TX/PRO/DX INJ NEW DRUG ADDON: CPT

## 2023-05-24 RX ORDER — SODIUM CHLORIDE 9 MG/ML
1000 INJECTION INTRAMUSCULAR; INTRAVENOUS; SUBCUTANEOUS ONCE
Refills: 0 | Status: COMPLETED | OUTPATIENT
Start: 2023-05-24 | End: 2023-05-24

## 2023-05-24 RX ADMIN — SODIUM CHLORIDE 1000 MILLILITER(S): 9 INJECTION INTRAMUSCULAR; INTRAVENOUS; SUBCUTANEOUS at 22:05

## 2023-05-24 NOTE — ED PROVIDER NOTE - OBJECTIVE STATEMENT
this is a 43 yo female presents to ed for evaluation of flank pain and right side abdominal pain. patient states that she went to urgent care and was sent to ed for ct

## 2023-05-24 NOTE — ED PROVIDER NOTE - ATTENDING APP SHARED VISIT CONTRIBUTION OF CARE
41 yo f hx diverticulitis, pe not on ac  pt presents for eval of R flank pain for days. + urinary frequency. pain radiates from R flank to RLQ. no vomiting. no fevers. no cp, sob, palpitations, syncope.    vss  gen- NAD, aaox3  card-rrr  lungs-ctab, no wheezing or rhonchi  abd-sntnd, no guarding or rebound, mild R CVAT  neuro- full str/sensation, cn ii-xii grossly intact, normal coordination and gait

## 2023-05-24 NOTE — ED PROVIDER NOTE - NSFOLLOWUPINSTRUCTIONS_ED_ALL_ED_FT
Take toradol as prescribed.  Do not take ibuprofen or naprosyn if you take toradol.  Take flomax daily.    Follow up with urology within 1 week.    Our Emergency Department Referral Coordinators will be reaching out to you in the next 24-48 hours from 9:00am to 5:00pm with a follow up appointment. Please expect a phone call from the hospital in that time frame. If you do not receive a call or if you have any questions or concerns, you can reach them at [phone].      Take toradol as needed for pain. Do not take other NSAIDS (ibuprofen/naprosyn) if you take toradol.  Take zofran as needed for nausea.   Take flomax daily.   Follow up with urology within 1 week.   Return for intractable pain, vomiting, fevers, burning with urination or other concerning symptoms.    Kidney Stones    Kidney stones (urolithiasis) are crystal deposits that form inside your kidneys. Pain is caused by the stone moving through the urinary tract, causing spasms of the ureter. Drink enough water and fluids to keep your urine clear or pale yellow. This will help you to pass the stone or stone fragments. If provided a strainer, strain all urine and keep all particulate matter and stones for a follow up appointment with a urologist.    SEEK IMMEDIATE MEDICAL CARE IF YOU HAVE ANY OF THE FOLLOWING SYMPTOMS: pain not controlled with medication, fever/chills, worsening vomiting, inability to urinate, or dizziness/lightheadedness.

## 2023-05-24 NOTE — ED ADULT NURSE NOTE - NSFALLUNIVINTERV_ED_ALL_ED
Bed/Stretcher in lowest position, wheels locked, appropriate side rails in place/Call bell, personal items and telephone in reach/Instruct patient to call for assistance before getting out of bed/chair/stretcher/Non-slip footwear applied when patient is off stretcher/[place] to call system/Physically safe environment - no spills, clutter or unnecessary equipment/Purposeful proactive rounding/Room/bathroom lighting operational, light cord in reach

## 2023-05-24 NOTE — ED ADULT NURSE NOTE - SUICIDE SCREENING QUESTION 2
The patient is Stable - Low risk of patient condition declining or worsening    Shift Goals  Clinical Goals: Infant will increase PO feeding intake  Patient Goals: N/A  Family Goals: POB will remain updated and involved in plan of care    Progress made toward(s) clinical / shift goals:    Problem: Thermoregulation  Goal: Patient's body temperature will be maintained (axillary temp 36.5-37.5 C)  Outcome: Progressing     Problem: Nutrition / Feeding  Goal: Patient will maintain balanced nutritional intake  Outcome: Progressing  Goal: Patient will tolerate transition to enteral feedings  Outcome: Progressing       Patient is not progressing towards the following goals:       No

## 2023-05-25 VITALS
SYSTOLIC BLOOD PRESSURE: 104 MMHG | RESPIRATION RATE: 18 BRPM | DIASTOLIC BLOOD PRESSURE: 66 MMHG | OXYGEN SATURATION: 100 % | HEART RATE: 68 BPM | TEMPERATURE: 96 F

## 2023-05-25 RX ORDER — KETOROLAC TROMETHAMINE 30 MG/ML
30 SYRINGE (ML) INJECTION ONCE
Refills: 0 | Status: DISCONTINUED | OUTPATIENT
Start: 2023-05-25 | End: 2023-05-25

## 2023-05-25 RX ORDER — ONDANSETRON 8 MG/1
4 TABLET, FILM COATED ORAL ONCE
Refills: 0 | Status: COMPLETED | OUTPATIENT
Start: 2023-05-25 | End: 2023-05-25

## 2023-05-25 RX ORDER — TAMSULOSIN HYDROCHLORIDE 0.4 MG/1
1 CAPSULE ORAL
Qty: 14 | Refills: 0
Start: 2023-05-25 | End: 2023-06-07

## 2023-05-25 RX ORDER — KETOROLAC TROMETHAMINE 30 MG/ML
1 SYRINGE (ML) INJECTION
Qty: 16 | Refills: 0
Start: 2023-05-25 | End: 2023-05-28

## 2023-05-25 RX ADMIN — ONDANSETRON 4 MILLIGRAM(S): 8 TABLET, FILM COATED ORAL at 00:18

## 2023-05-25 RX ADMIN — Medication 30 MILLIGRAM(S): at 00:18

## 2023-05-25 NOTE — ED ADULT NURSE REASSESSMENT NOTE - NS ED NURSE REASSESS COMMENT FT1
Pt IV was no longer patent and medication was not received.  Spoke with MD and he advised to give again IM.  Warm compress placed on IV site.

## 2023-05-30 ENCOUNTER — APPOINTMENT (OUTPATIENT)
Dept: UROLOGY | Facility: CLINIC | Age: 43
End: 2023-05-30
Payer: MEDICAID

## 2023-05-30 VITALS
BODY MASS INDEX: 36.64 KG/M2 | SYSTOLIC BLOOD PRESSURE: 106 MMHG | OXYGEN SATURATION: 98 % | HEIGHT: 66 IN | WEIGHT: 228 LBS | DIASTOLIC BLOOD PRESSURE: 69 MMHG | TEMPERATURE: 98.4 F | HEART RATE: 66 BPM | RESPIRATION RATE: 17 BRPM

## 2023-05-30 DIAGNOSIS — N20.1 CALCULUS OF URETER: ICD-10-CM

## 2023-05-30 DIAGNOSIS — N23 UNSPECIFIED RENAL COLIC: ICD-10-CM

## 2023-05-30 PROCEDURE — 99204 OFFICE O/P NEW MOD 45 MIN: CPT

## 2023-05-30 NOTE — ASSESSMENT
[FreeTextEntry1] : Patient currently free of colic.  We will get renal bladder sonogram in 6 weeks to ensure stone passage.  Metabolic stone work-up to be done in the future. [Ureteral Stone (592.1\N20.1)] : position

## 2023-05-30 NOTE — HISTORY OF PRESENT ILLNESS
[FreeTextEntry1] : 42-year-old recently seen in the hospital emergency room for right renal colic secondary to a 3 mm right distal ureteral stone on CT scan.  This was her first episode of stone that she is aware of.  She also had a CT scan in the past that showed a small right renal stone.  I reviewed the images of both CT scans..  Her white blood cell count was 6.58, creatinine 0.7, UA negative nitrates and leukocytes.  Positive for red blood cells.  Since discharge she passed some stonelike material and feels much better.

## 2023-06-01 NOTE — ED ADULT NURSE NOTE - NSIMPLEMENTINTERV_GEN_ALL_ED
[FreeTextEntry1] : 54yo female for screening colonoscopy\par \par She has had altered bowel habits with slight constipation and red areas (on red yeast rice) Implemented All Universal Safety Interventions:  [place] to call system. Call bell, personal items and telephone within reach. Instruct patient to call for assistance. Room bathroom lighting operational. Non-slip footwear when patient is off stretcher. Physically safe environment: no spills, clutter or unnecessary equipment. Stretcher in lowest position, wheels locked, appropriate side rails in place.

## 2023-06-25 ENCOUNTER — NON-APPOINTMENT (OUTPATIENT)
Age: 43
End: 2023-06-25

## 2023-06-30 ENCOUNTER — OUTPATIENT (OUTPATIENT)
Dept: OUTPATIENT SERVICES | Facility: HOSPITAL | Age: 43
LOS: 1 days | End: 2023-06-30
Payer: MEDICAID

## 2023-06-30 DIAGNOSIS — Z90.49 ACQUIRED ABSENCE OF OTHER SPECIFIED PARTS OF DIGESTIVE TRACT: Chronic | ICD-10-CM

## 2023-06-30 DIAGNOSIS — N20.1 CALCULUS OF URETER: ICD-10-CM

## 2023-06-30 DIAGNOSIS — N23 UNSPECIFIED RENAL COLIC: ICD-10-CM

## 2023-06-30 DIAGNOSIS — Z98.891 HISTORY OF UTERINE SCAR FROM PREVIOUS SURGERY: Chronic | ICD-10-CM

## 2023-06-30 DIAGNOSIS — Z00.8 ENCOUNTER FOR OTHER GENERAL EXAMINATION: ICD-10-CM

## 2023-06-30 PROCEDURE — 76770 US EXAM ABDO BACK WALL COMP: CPT

## 2023-06-30 PROCEDURE — 76770 US EXAM ABDO BACK WALL COMP: CPT | Mod: 26

## 2023-07-01 DIAGNOSIS — N23 UNSPECIFIED RENAL COLIC: ICD-10-CM

## 2023-07-01 DIAGNOSIS — N20.1 CALCULUS OF URETER: ICD-10-CM

## 2023-08-03 NOTE — H&P ADULT - NSHPPHYSICALEXAM_GEN_ALL_CORE
CONSTITUTIONAL: Well-developed; well-nourished; in no acute distress.   SKIN: warm, dry  HEAD: Normocephalic; atraumatic.  EYES: no conjunctival injection. PERRLA. EOMI.   ENT: No nasal discharge; airway clear.  NECK: Supple; non tender.  CARD: S1, S2 normal; no murmurs, gallops, or rubs. Regular rate and rhythm.   RESP: No wheezes, rales or rhonchi.  ABD: soft ntnd. BS+ in all 4 quadrants.  EXT: Normal ROM.  No clubbing, cyanosis or edema.   NEURO: Alert, oriented, grossly unremarkable. CN 2-12 grossly intact. No facial twitching noted on exam. Sensation and strength equal bilaterally. Patient ambulating normally with   at bedside.  PSYCH: Cooperative, appropriate. Initial (On Arrival)

## 2023-08-09 NOTE — ED ADULT NURSE NOTE - CCCP TRG CHIEF CMPLNT
facial drop Rotation Flap Text: The defect edges were debeveled with a #15 scalpel blade.  Given the location of the defect, shape of the defect and the proximity to free margins a rotation flap was deemed most appropriate.  Using a sterile surgical marker, an appropriate rotation flap was drawn incorporating the defect and placing the expected incisions within the relaxed skin tension lines where possible.    The area thus outlined was incised deep to adipose tissue with a #15 scalpel blade.  The skin margins were undermined to an appropriate distance in all directions utilizing iris scissors.

## 2023-08-23 NOTE — ED ADULT NURSE NOTE - PAIN RATING/NUMBER SCALE (0-10): REST
This office note has been dictated:    Chief complaint, HPI, and Full ROS completed as part of intake  --Medications reviewed per EPIC / pt. Interview  --Vital signs reviewed per Epic / with MA-LPN    --BMI evaluated-->Counselled as appropriate  --Tobacco use history reviewed-->Counselled as appropriate    --All tests, labs, and images associated with this visit personally interpreted by me     10

## 2023-08-29 NOTE — ED ADULT NURSE NOTE - NSFALLRSKOUTCOME_ED_ALL_ED
Normal DEXA. Results have been released to patient's Mychart. Please verify that these have been viewed. If not, please call with interpretation below:    I have reviewed your bone density test.  The findings show that you have a normal bone density.  Please take Calcium 1200 mg daily and Vitamin D3 800 units daily, along with weight bearing exercises for prevention of osteoporosis.  Also, recheck a bone density test in 5 years.       
Universal Safety Interventions

## 2023-09-12 ENCOUNTER — APPOINTMENT (OUTPATIENT)
Dept: UROLOGY | Facility: CLINIC | Age: 43
End: 2023-09-12
Payer: MEDICAID

## 2023-09-12 DIAGNOSIS — R35.0 FREQUENCY OF MICTURITION: ICD-10-CM

## 2023-09-12 PROCEDURE — 99214 OFFICE O/P EST MOD 30 MIN: CPT

## 2023-10-03 ENCOUNTER — NON-APPOINTMENT (OUTPATIENT)
Age: 43
End: 2023-10-03

## 2023-10-25 ENCOUNTER — EMERGENCY (EMERGENCY)
Facility: HOSPITAL | Age: 43
LOS: 0 days | Discharge: HOME | End: 2023-10-25
Attending: EMERGENCY MEDICINE
Payer: MEDICAID

## 2023-10-25 VITALS
SYSTOLIC BLOOD PRESSURE: 132 MMHG | DIASTOLIC BLOOD PRESSURE: 70 MMHG | OXYGEN SATURATION: 100 % | WEIGHT: 222.01 LBS | HEART RATE: 80 BPM | TEMPERATURE: 98 F | RESPIRATION RATE: 18 BRPM

## 2023-10-25 DIAGNOSIS — R07.89 OTHER CHEST PAIN: ICD-10-CM

## 2023-10-25 DIAGNOSIS — Z90.49 ACQUIRED ABSENCE OF OTHER SPECIFIED PARTS OF DIGESTIVE TRACT: ICD-10-CM

## 2023-10-25 DIAGNOSIS — Z87.19 PERSONAL HISTORY OF OTHER DISEASES OF THE DIGESTIVE SYSTEM: ICD-10-CM

## 2023-10-25 DIAGNOSIS — Z86.16 PERSONAL HISTORY OF COVID-19: ICD-10-CM

## 2023-10-25 DIAGNOSIS — R06.02 SHORTNESS OF BREATH: ICD-10-CM

## 2023-10-25 DIAGNOSIS — Z86.711 PERSONAL HISTORY OF PULMONARY EMBOLISM: ICD-10-CM

## 2023-10-25 DIAGNOSIS — Z87.59 PERSONAL HISTORY OF OTHER COMPLICATIONS OF PREGNANCY, CHILDBIRTH AND THE PUERPERIUM: ICD-10-CM

## 2023-10-25 DIAGNOSIS — Z98.891 HISTORY OF UTERINE SCAR FROM PREVIOUS SURGERY: Chronic | ICD-10-CM
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
## 2023-10-25 DIAGNOSIS — S80.12XA CONTUSION OF LEFT LOWER LEG, INITIAL ENCOUNTER: ICD-10-CM

## 2023-10-25 DIAGNOSIS — Z88.6 ALLERGY STATUS TO ANALGESIC AGENT: ICD-10-CM

## 2023-10-25 DIAGNOSIS — Z90.49 ACQUIRED ABSENCE OF OTHER SPECIFIED PARTS OF DIGESTIVE TRACT: Chronic | ICD-10-CM

## 2023-10-25 DIAGNOSIS — S80.11XA CONTUSION OF RIGHT LOWER LEG, INITIAL ENCOUNTER: ICD-10-CM

## 2023-10-25 DIAGNOSIS — Z88.0 ALLERGY STATUS TO PENICILLIN: ICD-10-CM
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
## 2023-10-25 LAB
ALBUMIN SERPL ELPH-MCNC: 4.3 G/DL — SIGNIFICANT CHANGE UP (ref 3.5–5.2)
ALBUMIN SERPL ELPH-MCNC: 4.3 G/DL — SIGNIFICANT CHANGE UP (ref 3.5–5.2)
ALP SERPL-CCNC: 66 U/L — SIGNIFICANT CHANGE UP (ref 30–115)
ALP SERPL-CCNC: 66 U/L — SIGNIFICANT CHANGE UP (ref 30–115)
ALT FLD-CCNC: 12 U/L — SIGNIFICANT CHANGE UP (ref 0–41)
ALT FLD-CCNC: 12 U/L — SIGNIFICANT CHANGE UP (ref 0–41)
ANION GAP SERPL CALC-SCNC: 11 MMOL/L — SIGNIFICANT CHANGE UP (ref 7–14)
ANION GAP SERPL CALC-SCNC: 11 MMOL/L — SIGNIFICANT CHANGE UP (ref 7–14)
APTT BLD: 29.3 SEC — SIGNIFICANT CHANGE UP (ref 27–39.2)
APTT BLD: 29.3 SEC — SIGNIFICANT CHANGE UP (ref 27–39.2)
AST SERPL-CCNC: 15 U/L — SIGNIFICANT CHANGE UP (ref 0–41)
AST SERPL-CCNC: 15 U/L — SIGNIFICANT CHANGE UP (ref 0–41)
BASOPHILS # BLD AUTO: 0.03 K/UL — SIGNIFICANT CHANGE UP (ref 0–0.2)
BASOPHILS # BLD AUTO: 0.03 K/UL — SIGNIFICANT CHANGE UP (ref 0–0.2)
BASOPHILS NFR BLD AUTO: 0.5 % — SIGNIFICANT CHANGE UP (ref 0–1)
BASOPHILS NFR BLD AUTO: 0.5 % — SIGNIFICANT CHANGE UP (ref 0–1)
BILIRUB SERPL-MCNC: <0.2 MG/DL — SIGNIFICANT CHANGE UP (ref 0.2–1.2)
BILIRUB SERPL-MCNC: <0.2 MG/DL — SIGNIFICANT CHANGE UP (ref 0.2–1.2)
BUN SERPL-MCNC: 15 MG/DL — SIGNIFICANT CHANGE UP (ref 10–20)
BUN SERPL-MCNC: 15 MG/DL — SIGNIFICANT CHANGE UP (ref 10–20)
CALCIUM SERPL-MCNC: 9.1 MG/DL — SIGNIFICANT CHANGE UP (ref 8.4–10.5)
CALCIUM SERPL-MCNC: 9.1 MG/DL — SIGNIFICANT CHANGE UP (ref 8.4–10.5)
CHLORIDE SERPL-SCNC: 104 MMOL/L — SIGNIFICANT CHANGE UP (ref 98–110)
CHLORIDE SERPL-SCNC: 104 MMOL/L — SIGNIFICANT CHANGE UP (ref 98–110)
CO2 SERPL-SCNC: 24 MMOL/L — SIGNIFICANT CHANGE UP (ref 17–32)
CO2 SERPL-SCNC: 24 MMOL/L — SIGNIFICANT CHANGE UP (ref 17–32)
CREAT SERPL-MCNC: 0.6 MG/DL — LOW (ref 0.7–1.5)
CREAT SERPL-MCNC: 0.6 MG/DL — LOW (ref 0.7–1.5)
EGFR: 115 ML/MIN/1.73M2 — SIGNIFICANT CHANGE UP
EGFR: 115 ML/MIN/1.73M2 — SIGNIFICANT CHANGE UP
EOSINOPHIL # BLD AUTO: 0.12 K/UL — SIGNIFICANT CHANGE UP (ref 0–0.7)
EOSINOPHIL # BLD AUTO: 0.12 K/UL — SIGNIFICANT CHANGE UP (ref 0–0.7)
EOSINOPHIL NFR BLD AUTO: 2.1 % — SIGNIFICANT CHANGE UP (ref 0–8)
EOSINOPHIL NFR BLD AUTO: 2.1 % — SIGNIFICANT CHANGE UP (ref 0–8)
GLUCOSE SERPL-MCNC: 101 MG/DL — HIGH (ref 70–99)
GLUCOSE SERPL-MCNC: 101 MG/DL — HIGH (ref 70–99)
HCG SERPL QL: NEGATIVE — SIGNIFICANT CHANGE UP
HCG SERPL QL: NEGATIVE — SIGNIFICANT CHANGE UP
HCT VFR BLD CALC: 37.5 % — SIGNIFICANT CHANGE UP (ref 37–47)
HCT VFR BLD CALC: 37.5 % — SIGNIFICANT CHANGE UP (ref 37–47)
HGB BLD-MCNC: 12.3 G/DL — SIGNIFICANT CHANGE UP (ref 12–16)
HGB BLD-MCNC: 12.3 G/DL — SIGNIFICANT CHANGE UP (ref 12–16)
IMM GRANULOCYTES NFR BLD AUTO: 0.7 % — HIGH (ref 0.1–0.3)
IMM GRANULOCYTES NFR BLD AUTO: 0.7 % — HIGH (ref 0.1–0.3)
INR BLD: 0.94 RATIO — SIGNIFICANT CHANGE UP (ref 0.65–1.3)
INR BLD: 0.94 RATIO — SIGNIFICANT CHANGE UP (ref 0.65–1.3)
LYMPHOCYTES # BLD AUTO: 2.12 K/UL — SIGNIFICANT CHANGE UP (ref 1.2–3.4)
LYMPHOCYTES # BLD AUTO: 2.12 K/UL — SIGNIFICANT CHANGE UP (ref 1.2–3.4)
LYMPHOCYTES # BLD AUTO: 36.5 % — SIGNIFICANT CHANGE UP (ref 20.5–51.1)
LYMPHOCYTES # BLD AUTO: 36.5 % — SIGNIFICANT CHANGE UP (ref 20.5–51.1)
MCHC RBC-ENTMCNC: 29.9 PG — SIGNIFICANT CHANGE UP (ref 27–31)
MCHC RBC-ENTMCNC: 29.9 PG — SIGNIFICANT CHANGE UP (ref 27–31)
MCHC RBC-ENTMCNC: 32.8 G/DL — SIGNIFICANT CHANGE UP (ref 32–37)
MCHC RBC-ENTMCNC: 32.8 G/DL — SIGNIFICANT CHANGE UP (ref 32–37)
MCV RBC AUTO: 91.2 FL — SIGNIFICANT CHANGE UP (ref 81–99)
MCV RBC AUTO: 91.2 FL — SIGNIFICANT CHANGE UP (ref 81–99)
MONOCYTES # BLD AUTO: 0.4 K/UL — SIGNIFICANT CHANGE UP (ref 0.1–0.6)
MONOCYTES # BLD AUTO: 0.4 K/UL — SIGNIFICANT CHANGE UP (ref 0.1–0.6)
MONOCYTES NFR BLD AUTO: 6.9 % — SIGNIFICANT CHANGE UP (ref 1.7–9.3)
MONOCYTES NFR BLD AUTO: 6.9 % — SIGNIFICANT CHANGE UP (ref 1.7–9.3)
NEUTROPHILS # BLD AUTO: 3.1 K/UL — SIGNIFICANT CHANGE UP (ref 1.4–6.5)
NEUTROPHILS # BLD AUTO: 3.1 K/UL — SIGNIFICANT CHANGE UP (ref 1.4–6.5)
NEUTROPHILS NFR BLD AUTO: 53.3 % — SIGNIFICANT CHANGE UP (ref 42.2–75.2)
NEUTROPHILS NFR BLD AUTO: 53.3 % — SIGNIFICANT CHANGE UP (ref 42.2–75.2)
NRBC # BLD: 0 /100 WBCS — SIGNIFICANT CHANGE UP (ref 0–0)
NRBC # BLD: 0 /100 WBCS — SIGNIFICANT CHANGE UP (ref 0–0)
NT-PROBNP SERPL-SCNC: <36 PG/ML — SIGNIFICANT CHANGE UP (ref 0–300)
NT-PROBNP SERPL-SCNC: <36 PG/ML — SIGNIFICANT CHANGE UP (ref 0–300)
PLATELET # BLD AUTO: 245 K/UL — SIGNIFICANT CHANGE UP (ref 130–400)
PLATELET # BLD AUTO: 245 K/UL — SIGNIFICANT CHANGE UP (ref 130–400)
PMV BLD: 10.3 FL — SIGNIFICANT CHANGE UP (ref 7.4–10.4)
PMV BLD: 10.3 FL — SIGNIFICANT CHANGE UP (ref 7.4–10.4)
POTASSIUM SERPL-MCNC: 3.8 MMOL/L — SIGNIFICANT CHANGE UP (ref 3.5–5)
POTASSIUM SERPL-MCNC: 3.8 MMOL/L — SIGNIFICANT CHANGE UP (ref 3.5–5)
POTASSIUM SERPL-SCNC: 3.8 MMOL/L — SIGNIFICANT CHANGE UP (ref 3.5–5)
POTASSIUM SERPL-SCNC: 3.8 MMOL/L — SIGNIFICANT CHANGE UP (ref 3.5–5)
PROT SERPL-MCNC: 6.7 G/DL — SIGNIFICANT CHANGE UP (ref 6–8)
PROT SERPL-MCNC: 6.7 G/DL — SIGNIFICANT CHANGE UP (ref 6–8)
PROTHROM AB SERPL-ACNC: 10.7 SEC — SIGNIFICANT CHANGE UP (ref 9.95–12.87)
PROTHROM AB SERPL-ACNC: 10.7 SEC — SIGNIFICANT CHANGE UP (ref 9.95–12.87)
RBC # BLD: 4.11 M/UL — LOW (ref 4.2–5.4)
RBC # BLD: 4.11 M/UL — LOW (ref 4.2–5.4)
RBC # FLD: 12.6 % — SIGNIFICANT CHANGE UP (ref 11.5–14.5)
RBC # FLD: 12.6 % — SIGNIFICANT CHANGE UP (ref 11.5–14.5)
SODIUM SERPL-SCNC: 139 MMOL/L — SIGNIFICANT CHANGE UP (ref 135–146)
SODIUM SERPL-SCNC: 139 MMOL/L — SIGNIFICANT CHANGE UP (ref 135–146)
TROPONIN T SERPL-MCNC: <0.01 NG/ML — SIGNIFICANT CHANGE UP
TROPONIN T SERPL-MCNC: <0.01 NG/ML — SIGNIFICANT CHANGE UP
WBC # BLD: 5.81 K/UL — SIGNIFICANT CHANGE UP (ref 4.8–10.8)
WBC # BLD: 5.81 K/UL — SIGNIFICANT CHANGE UP (ref 4.8–10.8)
WBC # FLD AUTO: 5.81 K/UL — SIGNIFICANT CHANGE UP (ref 4.8–10.8)
WBC # FLD AUTO: 5.81 K/UL — SIGNIFICANT CHANGE UP (ref 4.8–10.8)

## 2023-10-25 PROCEDURE — 71275 CT ANGIOGRAPHY CHEST: CPT | Mod: MA

## 2023-10-25 PROCEDURE — 85025 COMPLETE CBC W/AUTO DIFF WBC: CPT

## 2023-10-25 PROCEDURE — 71045 X-RAY EXAM CHEST 1 VIEW: CPT | Mod: 26

## 2023-10-25 PROCEDURE — 36415 COLL VENOUS BLD VENIPUNCTURE: CPT

## 2023-10-25 PROCEDURE — 71275 CT ANGIOGRAPHY CHEST: CPT | Mod: 26,MA

## 2023-10-25 PROCEDURE — 99285 EMERGENCY DEPT VISIT HI MDM: CPT

## 2023-10-25 PROCEDURE — 85730 THROMBOPLASTIN TIME PARTIAL: CPT

## 2023-10-25 PROCEDURE — 93005 ELECTROCARDIOGRAM TRACING: CPT

## 2023-10-25 PROCEDURE — 84703 CHORIONIC GONADOTROPIN ASSAY: CPT

## 2023-10-25 PROCEDURE — 71045 X-RAY EXAM CHEST 1 VIEW: CPT

## 2023-10-25 PROCEDURE — 84484 ASSAY OF TROPONIN QUANT: CPT

## 2023-10-25 PROCEDURE — 83880 ASSAY OF NATRIURETIC PEPTIDE: CPT

## 2023-10-25 PROCEDURE — 93010 ELECTROCARDIOGRAM REPORT: CPT

## 2023-10-25 PROCEDURE — 80053 COMPREHEN METABOLIC PANEL: CPT

## 2023-10-25 PROCEDURE — 99285 EMERGENCY DEPT VISIT HI MDM: CPT | Mod: 25

## 2023-10-25 PROCEDURE — 85610 PROTHROMBIN TIME: CPT

## 2023-10-25 NOTE — ED PROVIDER NOTE - OBJECTIVE STATEMENT
41 yo F with pmhx of PE not on AC presenting for evaluation of shortness of breath over the last 2 weeks. Reports some chest tightness. Mentions getting bruises on her legs without known trauma. Denies any recent travel, recent surgery, leg swelling, or hormonal use. No fever, chills, abdominal pain, nausea, vomiting, diarrhea, back pain, urinary symptoms, headache, dizziness, paresthesias, or weakness.

## 2023-10-25 NOTE — ED PROVIDER NOTE - NSFOLLOWUPINSTRUCTIONS_ED_ALL_ED_FT
Our Emergency Department Referral Coordinators will be reaching out to you in the next 24-48 hours from 9:00am to 5:00pm with a follow up appointment. Please expect a phone call from the hospital in that time frame. If you do not receive a call or if you have any questions or concerns, you can reach them at   [phone]     Shortness of breath    Shortness of breath means you have trouble breathing and could indicate a medical problem. Causes include lung diseases, heart disease, low amount of red blood cells (anemia), poor physical fitness, being overweight, smoking, etc. Your health care provider may not be able to find a cause for your shortness of breath after your exam. In this case, it is important to have a follow-up exam with your primary care physician as instructed. If medicines were prescribed, take them as directed for the full length of time directed. Refrain from tobacco products.    SEEK IMMEDIATE MEDICAL CARE IF YOU HAVE THE FOLLOWING SYMPTOMS: worsening shortness of breath, chest pain, back pain, abdominal pain, fever, coughing up blood, lightheadedness/dizziness.

## 2023-10-25 NOTE — ED PROVIDER NOTE - PATIENT PORTAL LINK FT
You can access the FollowMyHealth Patient Portal offered by [facility] by registering at the following website: [email or web address]. By joining [employer]’s FollowMyHealth portal, you will also be able to view your health information using other applications (apps) compatible with our system.

## 2023-10-25 NOTE — ED PROVIDER NOTE - CLINICAL SUMMARY MEDICAL DECISION MAKING FREE TEXT BOX
No distress.  VSS.  EKG non ischemic.  Troponin negative.  HEART score 0.  CT r/o PE negative.  DC home.  F/U with Cardio/Pulm.  Strict return instructions discussed.

## 2023-10-25 NOTE — ED PROVIDER NOTE - COVID-19 RESULT
Outpatient Oncology Care Plan  Problem list:  fatigue  knowledge deficit  peripheral neuropathy    Problems related to:    disease/disease progression    Interventions:  encourage activity as tolerated  promoted rest  provided general teaching    Expected NEGATIVE

## 2023-11-01 ENCOUNTER — APPOINTMENT (OUTPATIENT)
Dept: UROLOGY | Facility: CLINIC | Age: 43
End: 2023-11-01
Payer: MEDICAID

## 2023-11-01 VITALS
WEIGHT: 224 LBS | BODY MASS INDEX: 36 KG/M2 | HEIGHT: 66 IN | DIASTOLIC BLOOD PRESSURE: 67 MMHG | TEMPERATURE: 98.1 F | HEART RATE: 57 BPM | OXYGEN SATURATION: 98 % | SYSTOLIC BLOOD PRESSURE: 107 MMHG | RESPIRATION RATE: 18 BRPM

## 2023-11-01 DIAGNOSIS — N20.0 CALCULUS OF KIDNEY: ICD-10-CM

## 2023-11-01 PROCEDURE — 99213 OFFICE O/P EST LOW 20 MIN: CPT

## 2023-11-06 NOTE — CHART NOTE - NSCHARTNOTEFT_GEN_A_CORE
emailed to pulmonary - AR 10/26 / Pulmo left VM for pt 10/26 - MATTHEW/ no further contact 11/1-AC
[facility] MRN [id number]- emailed to pulmonary - AR 10/26 / Pulmo left VM for pt 10/26 - JL/ no further contact 11/1-AC / Appointment made - MATTHEW    Specialty: pulmonary   Date: November 10, 2023  Time: 11:45 am  Location: [address] [place] [address]  Clinician: Dr. Graves

## 2023-11-10 ENCOUNTER — APPOINTMENT (OUTPATIENT)
Dept: PULMONOLOGY | Facility: CLINIC | Age: 43
End: 2023-11-10
Payer: MEDICAID

## 2023-11-10 VITALS
HEIGHT: 66 IN | BODY MASS INDEX: 34.87 KG/M2 | DIASTOLIC BLOOD PRESSURE: 70 MMHG | WEIGHT: 217 LBS | OXYGEN SATURATION: 99 % | HEART RATE: 61 BPM | RESPIRATION RATE: 14 BRPM | SYSTOLIC BLOOD PRESSURE: 114 MMHG

## 2023-11-10 DIAGNOSIS — Z82.49 FAMILY HISTORY OF ISCHEMIC HEART DISEASE AND OTHER DISEASES OF THE CIRCULATORY SYSTEM: ICD-10-CM

## 2023-11-10 DIAGNOSIS — Z86.711 PERSONAL HISTORY OF PULMONARY EMBOLISM: ICD-10-CM

## 2023-11-10 PROCEDURE — 99204 OFFICE O/P NEW MOD 45 MIN: CPT

## 2023-11-14 ENCOUNTER — APPOINTMENT (OUTPATIENT)
Dept: PULMONOLOGY | Facility: HOSPITAL | Age: 43
End: 2023-11-14
Payer: MEDICAID

## 2023-11-14 ENCOUNTER — OUTPATIENT (OUTPATIENT)
Dept: OUTPATIENT SERVICES | Facility: HOSPITAL | Age: 43
LOS: 1 days | End: 2023-11-14
Payer: MEDICAID

## 2023-11-14 DIAGNOSIS — Z90.49 ACQUIRED ABSENCE OF OTHER SPECIFIED PARTS OF DIGESTIVE TRACT: Chronic | ICD-10-CM

## 2023-11-14 DIAGNOSIS — R06.02 SHORTNESS OF BREATH: ICD-10-CM

## 2023-11-14 DIAGNOSIS — Z98.891 HISTORY OF UTERINE SCAR FROM PREVIOUS SURGERY: Chronic | ICD-10-CM

## 2023-11-14 PROCEDURE — 94070 EVALUATION OF WHEEZING: CPT

## 2023-11-14 PROCEDURE — 94727 GAS DIL/WSHOT DETER LNG VOL: CPT | Mod: 26

## 2023-11-14 PROCEDURE — 94726 PLETHYSMOGRAPHY LUNG VOLUMES: CPT

## 2023-11-14 PROCEDURE — 94060 EVALUATION OF WHEEZING: CPT | Mod: 26

## 2023-11-14 PROCEDURE — 94729 DIFFUSING CAPACITY: CPT

## 2023-11-14 PROCEDURE — 94729 DIFFUSING CAPACITY: CPT | Mod: 26

## 2023-11-14 PROCEDURE — 94664 DEMO&/EVAL PT USE INHALER: CPT

## 2023-11-15 DIAGNOSIS — R06.02 SHORTNESS OF BREATH: ICD-10-CM

## 2023-11-27 ENCOUNTER — OUTPATIENT (OUTPATIENT)
Dept: OUTPATIENT SERVICES | Facility: HOSPITAL | Age: 43
LOS: 1 days | Discharge: HOME | End: 2023-11-27
Payer: MEDICAID

## 2023-11-27 ENCOUNTER — APPOINTMENT (OUTPATIENT)
Dept: SLEEP CENTER | Facility: HOSPITAL | Age: 43
End: 2023-11-27
Payer: MEDICAID

## 2023-11-27 DIAGNOSIS — Z98.891 HISTORY OF UTERINE SCAR FROM PREVIOUS SURGERY: Chronic | ICD-10-CM

## 2023-11-27 DIAGNOSIS — Z90.49 ACQUIRED ABSENCE OF OTHER SPECIFIED PARTS OF DIGESTIVE TRACT: Chronic | ICD-10-CM

## 2023-11-27 DIAGNOSIS — G47.33 OBSTRUCTIVE SLEEP APNEA (ADULT) (PEDIATRIC): ICD-10-CM

## 2023-11-27 PROCEDURE — 95800 SLP STDY UNATTENDED: CPT | Mod: 26

## 2023-11-27 PROCEDURE — 95800 SLP STDY UNATTENDED: CPT

## 2023-12-01 DIAGNOSIS — G47.33 OBSTRUCTIVE SLEEP APNEA (ADULT) (PEDIATRIC): ICD-10-CM

## 2023-12-24 ENCOUNTER — NON-APPOINTMENT (OUTPATIENT)
Age: 43
End: 2023-12-24

## 2024-01-17 ENCOUNTER — NON-APPOINTMENT (OUTPATIENT)
Age: 44
End: 2024-01-17

## 2024-01-18 ENCOUNTER — EMERGENCY (EMERGENCY)
Facility: HOSPITAL | Age: 44
LOS: 1 days | Discharge: HOME | End: 2024-01-20
Attending: EMERGENCY MEDICINE
Payer: MEDICAID

## 2024-01-18 VITALS
TEMPERATURE: 99 F | OXYGEN SATURATION: 100 % | SYSTOLIC BLOOD PRESSURE: 145 MMHG | DIASTOLIC BLOOD PRESSURE: 85 MMHG | HEART RATE: 94 BPM | RESPIRATION RATE: 18 BRPM | WEIGHT: 222.01 LBS

## 2024-01-18 DIAGNOSIS — Z86.73 PERSONAL HISTORY OF TRANSIENT ISCHEMIC ATTACK (TIA), AND CEREBRAL INFARCTION WITHOUT RESIDUAL DEFICITS: ICD-10-CM

## 2024-01-18 DIAGNOSIS — Z86.711 PERSONAL HISTORY OF PULMONARY EMBOLISM: ICD-10-CM

## 2024-01-18 DIAGNOSIS — R53.1 WEAKNESS: ICD-10-CM

## 2024-01-18 DIAGNOSIS — R20.2 PARESTHESIA OF SKIN: ICD-10-CM

## 2024-01-18 DIAGNOSIS — Z98.891 HISTORY OF UTERINE SCAR FROM PREVIOUS SURGERY: Chronic | ICD-10-CM

## 2024-01-18 DIAGNOSIS — Z90.49 ACQUIRED ABSENCE OF OTHER SPECIFIED PARTS OF DIGESTIVE TRACT: Chronic | ICD-10-CM

## 2024-01-18 DIAGNOSIS — J02.9 ACUTE PHARYNGITIS, UNSPECIFIED: ICD-10-CM

## 2024-01-18 DIAGNOSIS — R07.89 OTHER CHEST PAIN: ICD-10-CM

## 2024-01-18 DIAGNOSIS — I26.99 OTHER PULMONARY EMBOLISM WITHOUT ACUTE COR PULMONALE: ICD-10-CM

## 2024-01-18 DIAGNOSIS — Z88.0 ALLERGY STATUS TO PENICILLIN: ICD-10-CM

## 2024-01-18 DIAGNOSIS — Z20.822 CONTACT WITH AND (SUSPECTED) EXPOSURE TO COVID-19: ICD-10-CM

## 2024-01-18 DIAGNOSIS — R29.898 OTHER SYMPTOMS AND SIGNS INVOLVING THE MUSCULOSKELETAL SYSTEM: ICD-10-CM

## 2024-01-18 DIAGNOSIS — H53.8 OTHER VISUAL DISTURBANCES: ICD-10-CM

## 2024-01-18 DIAGNOSIS — Z88.5 ALLERGY STATUS TO NARCOTIC AGENT: ICD-10-CM

## 2024-01-18 LAB
ALBUMIN SERPL ELPH-MCNC: 4.3 G/DL — SIGNIFICANT CHANGE UP (ref 3.5–5.2)
ALP SERPL-CCNC: 92 U/L — SIGNIFICANT CHANGE UP (ref 30–115)
ALT FLD-CCNC: 17 U/L — SIGNIFICANT CHANGE UP (ref 0–41)
ANION GAP SERPL CALC-SCNC: 16 MMOL/L — HIGH (ref 7–14)
APPEARANCE UR: CLEAR — SIGNIFICANT CHANGE UP
APTT BLD: 27 SEC — SIGNIFICANT CHANGE UP (ref 27–39.2)
APTT BLD: SIGNIFICANT CHANGE UP SEC (ref 27–39.2)
AST SERPL-CCNC: 24 U/L — SIGNIFICANT CHANGE UP (ref 0–41)
BACTERIA # UR AUTO: ABNORMAL /HPF
BASOPHILS # BLD AUTO: 0.03 K/UL — SIGNIFICANT CHANGE UP (ref 0–0.2)
BASOPHILS NFR BLD AUTO: 0.3 % — SIGNIFICANT CHANGE UP (ref 0–1)
BILIRUB SERPL-MCNC: 0.3 MG/DL — SIGNIFICANT CHANGE UP (ref 0.2–1.2)
BILIRUB UR-MCNC: NEGATIVE — SIGNIFICANT CHANGE UP
BUN SERPL-MCNC: 11 MG/DL — SIGNIFICANT CHANGE UP (ref 10–20)
CALCIUM SERPL-MCNC: 9.5 MG/DL — SIGNIFICANT CHANGE UP (ref 8.4–10.5)
CAST: 1 /LPF — SIGNIFICANT CHANGE UP (ref 0–4)
CHLORIDE SERPL-SCNC: 99 MMOL/L — SIGNIFICANT CHANGE UP (ref 98–110)
CO2 SERPL-SCNC: 21 MMOL/L — SIGNIFICANT CHANGE UP (ref 17–32)
COLOR SPEC: YELLOW — SIGNIFICANT CHANGE UP
CREAT SERPL-MCNC: 0.7 MG/DL — SIGNIFICANT CHANGE UP (ref 0.7–1.5)
D DIMER BLD IA.RAPID-MCNC: 1141 NG/ML DDU — HIGH
D DIMER BLD IA.RAPID-MCNC: 490 NG/ML DDU — HIGH
DIFF PNL FLD: ABNORMAL
EGFR: 110 ML/MIN/1.73M2 — SIGNIFICANT CHANGE UP
EOSINOPHIL # BLD AUTO: 0.03 K/UL — SIGNIFICANT CHANGE UP (ref 0–0.7)
EOSINOPHIL NFR BLD AUTO: 0.3 % — SIGNIFICANT CHANGE UP (ref 0–8)
FLUAV AG NPH QL: SIGNIFICANT CHANGE UP
FLUBV AG NPH QL: SIGNIFICANT CHANGE UP
GLUCOSE SERPL-MCNC: 92 MG/DL — SIGNIFICANT CHANGE UP (ref 70–99)
GLUCOSE UR QL: NEGATIVE MG/DL — SIGNIFICANT CHANGE UP
HCG SERPL QL: NEGATIVE — SIGNIFICANT CHANGE UP
HCT VFR BLD CALC: 36.3 % — LOW (ref 37–47)
HGB BLD-MCNC: 12.3 G/DL — SIGNIFICANT CHANGE UP (ref 12–16)
IMM GRANULOCYTES NFR BLD AUTO: 0.5 % — HIGH (ref 0.1–0.3)
INR BLD: 1.04 RATIO — SIGNIFICANT CHANGE UP (ref 0.65–1.3)
INR BLD: 1.14 RATIO — SIGNIFICANT CHANGE UP (ref 0.65–1.3)
KETONES UR-MCNC: NEGATIVE MG/DL — SIGNIFICANT CHANGE UP
LEUKOCYTE ESTERASE UR-ACNC: NEGATIVE — SIGNIFICANT CHANGE UP
LYMPHOCYTES # BLD AUTO: 1.38 K/UL — SIGNIFICANT CHANGE UP (ref 1.2–3.4)
LYMPHOCYTES # BLD AUTO: 14.9 % — LOW (ref 20.5–51.1)
MAGNESIUM SERPL-MCNC: 1.8 MG/DL — SIGNIFICANT CHANGE UP (ref 1.8–2.4)
MCHC RBC-ENTMCNC: 30.4 PG — SIGNIFICANT CHANGE UP (ref 27–31)
MCHC RBC-ENTMCNC: 33.9 G/DL — SIGNIFICANT CHANGE UP (ref 32–37)
MCV RBC AUTO: 89.6 FL — SIGNIFICANT CHANGE UP (ref 81–99)
MONOCYTES # BLD AUTO: 0.69 K/UL — HIGH (ref 0.1–0.6)
MONOCYTES NFR BLD AUTO: 7.5 % — SIGNIFICANT CHANGE UP (ref 1.7–9.3)
NEUTROPHILS # BLD AUTO: 7.07 K/UL — HIGH (ref 1.4–6.5)
NEUTROPHILS NFR BLD AUTO: 76.5 % — HIGH (ref 42.2–75.2)
NITRITE UR-MCNC: NEGATIVE — SIGNIFICANT CHANGE UP
NRBC # BLD: 0 /100 WBCS — SIGNIFICANT CHANGE UP (ref 0–0)
NT-PROBNP SERPL-SCNC: <5 PG/ML — SIGNIFICANT CHANGE UP (ref 0–300)
PH UR: 6.5 — SIGNIFICANT CHANGE UP (ref 5–8)
PLATELET # BLD AUTO: 255 K/UL — SIGNIFICANT CHANGE UP (ref 130–400)
PMV BLD: 10.1 FL — SIGNIFICANT CHANGE UP (ref 7.4–10.4)
POTASSIUM SERPL-MCNC: 5.1 MMOL/L — HIGH (ref 3.5–5)
POTASSIUM SERPL-SCNC: 5.1 MMOL/L — HIGH (ref 3.5–5)
PROT SERPL-MCNC: 7.2 G/DL — SIGNIFICANT CHANGE UP (ref 6–8)
PROT UR-MCNC: NEGATIVE MG/DL — SIGNIFICANT CHANGE UP
PROTHROM AB SERPL-ACNC: 11.9 SEC — SIGNIFICANT CHANGE UP (ref 9.95–12.87)
PROTHROM AB SERPL-ACNC: 13 SEC — HIGH (ref 9.95–12.87)
RBC # BLD: 4.05 M/UL — LOW (ref 4.2–5.4)
RBC # FLD: 12.8 % — SIGNIFICANT CHANGE UP (ref 11.5–14.5)
RBC CASTS # UR COMP ASSIST: 2 /HPF — SIGNIFICANT CHANGE UP (ref 0–4)
RSV RNA NPH QL NAA+NON-PROBE: SIGNIFICANT CHANGE UP
SARS-COV-2 RNA SPEC QL NAA+PROBE: SIGNIFICANT CHANGE UP
SODIUM SERPL-SCNC: 136 MMOL/L — SIGNIFICANT CHANGE UP (ref 135–146)
SP GR SPEC: >1.03 — HIGH (ref 1–1.03)
SQUAMOUS # UR AUTO: 9 /HPF — HIGH (ref 0–5)
TROPONIN T, HIGH SENSITIVITY RESULT: <6 NG/L — SIGNIFICANT CHANGE UP (ref 6–13)
UROBILINOGEN FLD QL: 0.2 MG/DL — SIGNIFICANT CHANGE UP (ref 0.2–1)
WBC # BLD: 9.25 K/UL — SIGNIFICANT CHANGE UP (ref 4.8–10.8)
WBC # FLD AUTO: 9.25 K/UL — SIGNIFICANT CHANGE UP (ref 4.8–10.8)
WBC UR QL: 5 /HPF — SIGNIFICANT CHANGE UP (ref 0–5)

## 2024-01-18 PROCEDURE — 0042T: CPT | Mod: MA

## 2024-01-18 PROCEDURE — 84484 ASSAY OF TROPONIN QUANT: CPT

## 2024-01-18 PROCEDURE — 85025 COMPLETE CBC W/AUTO DIFF WBC: CPT

## 2024-01-18 PROCEDURE — 99223 1ST HOSP IP/OBS HIGH 75: CPT

## 2024-01-18 PROCEDURE — 85379 FIBRIN DEGRADATION QUANT: CPT

## 2024-01-18 PROCEDURE — 80053 COMPREHEN METABOLIC PANEL: CPT

## 2024-01-18 PROCEDURE — 85730 THROMBOPLASTIN TIME PARTIAL: CPT

## 2024-01-18 PROCEDURE — 93010 ELECTROCARDIOGRAM REPORT: CPT

## 2024-01-18 PROCEDURE — 93005 ELECTROCARDIOGRAM TRACING: CPT

## 2024-01-18 PROCEDURE — 83735 ASSAY OF MAGNESIUM: CPT

## 2024-01-18 PROCEDURE — 71045 X-RAY EXAM CHEST 1 VIEW: CPT | Mod: 26

## 2024-01-18 PROCEDURE — 70496 CT ANGIOGRAPHY HEAD: CPT | Mod: 26,MA

## 2024-01-18 PROCEDURE — 85610 PROTHROMBIN TIME: CPT

## 2024-01-18 PROCEDURE — 70498 CT ANGIOGRAPHY NECK: CPT | Mod: MA

## 2024-01-18 PROCEDURE — 96375 TX/PRO/DX INJ NEW DRUG ADDON: CPT | Mod: XU

## 2024-01-18 PROCEDURE — 71275 CT ANGIOGRAPHY CHEST: CPT | Mod: MA

## 2024-01-18 PROCEDURE — 87086 URINE CULTURE/COLONY COUNT: CPT

## 2024-01-18 PROCEDURE — 72156 MRI NECK SPINE W/O & W/DYE: CPT | Mod: MA

## 2024-01-18 PROCEDURE — 70450 CT HEAD/BRAIN W/O DYE: CPT | Mod: 26,MA,59

## 2024-01-18 PROCEDURE — 36415 COLL VENOUS BLD VENIPUNCTURE: CPT

## 2024-01-18 PROCEDURE — 0241U: CPT

## 2024-01-18 PROCEDURE — G0378: CPT

## 2024-01-18 PROCEDURE — 99285 EMERGENCY DEPT VISIT HI MDM: CPT

## 2024-01-18 PROCEDURE — A9579: CPT

## 2024-01-18 PROCEDURE — 71045 X-RAY EXAM CHEST 1 VIEW: CPT

## 2024-01-18 PROCEDURE — 84703 CHORIONIC GONADOTROPIN ASSAY: CPT

## 2024-01-18 PROCEDURE — 96374 THER/PROPH/DIAG INJ IV PUSH: CPT | Mod: XU

## 2024-01-18 PROCEDURE — 70553 MRI BRAIN STEM W/O & W/DYE: CPT | Mod: MA

## 2024-01-18 PROCEDURE — 71275 CT ANGIOGRAPHY CHEST: CPT | Mod: 26,MA

## 2024-01-18 PROCEDURE — 83880 ASSAY OF NATRIURETIC PEPTIDE: CPT

## 2024-01-18 PROCEDURE — 70498 CT ANGIOGRAPHY NECK: CPT | Mod: 26,MA

## 2024-01-18 PROCEDURE — 70496 CT ANGIOGRAPHY HEAD: CPT | Mod: MA

## 2024-01-18 PROCEDURE — 81001 URINALYSIS AUTO W/SCOPE: CPT

## 2024-01-18 PROCEDURE — 99285 EMERGENCY DEPT VISIT HI MDM: CPT | Mod: 25

## 2024-01-18 PROCEDURE — 93970 EXTREMITY STUDY: CPT

## 2024-01-18 PROCEDURE — 70450 CT HEAD/BRAIN W/O DYE: CPT | Mod: MA

## 2024-01-18 RX ORDER — SODIUM CHLORIDE 9 MG/ML
1000 INJECTION, SOLUTION INTRAVENOUS ONCE
Refills: 0 | Status: COMPLETED | OUTPATIENT
Start: 2024-01-18 | End: 2024-01-18

## 2024-01-18 RX ORDER — ONDANSETRON 8 MG/1
4 TABLET, FILM COATED ORAL ONCE
Refills: 0 | Status: COMPLETED | OUTPATIENT
Start: 2024-01-18 | End: 2024-01-18

## 2024-01-18 RX ORDER — ACETAMINOPHEN 500 MG
975 TABLET ORAL ONCE
Refills: 0 | Status: COMPLETED | OUTPATIENT
Start: 2024-01-18 | End: 2024-01-18

## 2024-01-18 RX ORDER — METHOCARBAMOL 500 MG/1
1500 TABLET, FILM COATED ORAL ONCE
Refills: 0 | Status: COMPLETED | OUTPATIENT
Start: 2024-01-18 | End: 2024-01-18

## 2024-01-18 RX ADMIN — SODIUM CHLORIDE 1000 MILLILITER(S): 9 INJECTION, SOLUTION INTRAVENOUS at 21:04

## 2024-01-18 RX ADMIN — METHOCARBAMOL 1500 MILLIGRAM(S): 500 TABLET, FILM COATED ORAL at 23:00

## 2024-01-18 RX ADMIN — Medication 975 MILLIGRAM(S): at 21:04

## 2024-01-18 RX ADMIN — ONDANSETRON 4 MILLIGRAM(S): 8 TABLET, FILM COATED ORAL at 20:37

## 2024-01-18 NOTE — CONSULT NOTE ADULT - ATTENDING COMMENTS
Patient with bilateral symptoms that cannot be localized to a specific neuroanatomical territory. Symptoms inconsistent with stroke. Given constellation of symptoms, would obtain MRI brain +/- contrast to assess for underlying demyelinating lesion (e.g. MS).    Imaging Update: MRI brain negative for stroke. Some WM lesions that are non-specific. Given reported symptoms, would obtain MRI C-spine +/- contrast. If no concerning findings, may follow-up in general neurology clinic for further evaluation. Will follow imaging results.

## 2024-01-18 NOTE — ED PROVIDER NOTE - PHYSICAL EXAMINATION
PHYSICAL EXAM: I have reviewed current vital signs.  GENERAL: NAD, well-nourished; well-developed.  HEAD:  Normocephalic, atraumatic.  EYES: Conjunctiva and sclera clear.  ENT: MMM, no erythema/exudates.  NECK: Supple, no JVD.  CHEST/LUNG: Clear to auscultation bilaterally; no wheezes, rales, or rhonchi.  HEART: Regular rate and rhythm, normal S1 and S2; no murmurs, rubs, or gallops.  ABDOMEN: Soft, nontender, nondistended.  EXTREMITIES:  2+ peripheral pulses; no clubbing, cyanosis, or edema.  PSYCH: Cooperative, appropriate, normal mood and affect.  NEUROLOGY: A&O x 3. Decreased sensation and decreased strength to right hand and left leg.  SKIN: Warm and dry.

## 2024-01-18 NOTE — CONSULT NOTE ADULT - SUBJECTIVE AND OBJECTIVE BOX
NEUROLOGY CONSULT    HPI:  This is a 43 year old female who presents as a stroke code for LUE weakness and RLE numbness. She says she had chest pain and generalized weakness starting 2 days ago, but 3 hours prior to presentation noticed she had RLE numbness and LUE weakness as well as blurry vision and difficulty hearing. Exact time for LKW is not known, she says 3 hours but on asking for specifics/clarification cannot give an exact time.  She reports history of recent flu 2 weeks ago, went to urgent care today had covid/flu swab done which were negative and was sent here.     She reports a history of prior TIA x2, once in November, once 2 years ago which self resolved. Also says she has a history of PE 2 years ago, was on Coumadin but stopped. She reports history of multiple seizures in the past, history is inconsistent but says EEGs done were negative, never started on any AEDs. She describes b/l tonic clonic movements with awareness present and diagnosis of ?MS but does not know about any MRI findings, not on any meds.       MEDICATIONS  Home Medications:    MEDICATIONS  (STANDING):  ondansetron Injectable 4 milliGRAM(s) IV Push once    MEDICATIONS  (PRN):      FAMILY HISTORY:    SOCIAL HISTORY: negative for tobacco, alcohol, or ilicit drug use.    Allergies    penicillin (Rash)  morphine (Unknown)    Intolerances        Physical exam:  Constitutional: Patient is tearful, anxious appearing  Eyes: Anicteric sclerae, moist conjunctivae, see below for CNs  Neck: trachea midline, FROM, supple.  Extremities: no edema    Neurologic:  -Mental status: Awake, alert, oriented to person, place, and time. Speech is fluent with intact naming, repetition, and comprehension, no dysarthria. Recent and remote memory intact. Follows commands. Attention/concentration intact. Fund of knowledge appropriate.  -Cranial nerves:   II: Visual fields are full to confrontation.  III, IV, VI: Extraocular movements are intact without nystagmus. Pupils equally round and reactive to light  V:  Facial sensation V1-V3 equal and intact   VII: Face is symmetric with normal eye closure and smile  VIII: Hearing is bilaterally intact to finger rub  IX, X: Uvula is midline and soft palate rises symmetrically  XI: Head turning and shoulder shrug are intact.  XII: Tongue protrudes midline  Motor: Normal bulk and tone. No pronator drift. LUE strength 4+/5, RUE strength 5/5.bilateral lower extremities 5/5.  Sensation: Diminished to light touch RLE. No neglect or extinction on double simultaneous testing.  Coordination: No dysmetria on finger-to-nose and heel-to-shin bilaterally  Reflexes: Downgoing toes bilaterally   Gait: Narrow gait and steady    NIHSS: 2 (LUE drift, RLE numbness)     LABS:                        12.3   9.25  )-----------( 255      ( 18 Jan 2024 19:30 )             36.3     01-18    136  |  99  |  11  ----------------------------<  92  5.1<H>   |  21  |  0.7    Ca    9.5      18 Jan 2024 19:30  Mg     1.8     01-18    TPro  7.2  /  Alb  4.3  /  TBili  0.3  /  DBili  x   /  AST  24  /  ALT  17  /  AlkPhos  92  01-18    Hemoglobin A1C:   Vitamin B12   PT/INR - ( 18 Jan 2024 19:32 )   PT: 11.90 sec;   INR: 1.04 ratio           CAPILLARY BLOOD GLUCOSE          Urinalysis Basic - ( 18 Jan 2024 19:30 )    Color: x / Appearance: x / SG: x / pH: x  Gluc: 92 mg/dL / Ketone: x  / Bili: x / Urobili: x   Blood: x / Protein: x / Nitrite: x   Leuk Esterase: x / RBC: x / WBC x   Sq Epi: x / Non Sq Epi: x / Bacteria: x            Microbiology:      RADIOLOGY, EKG AND ADDITIONAL TESTS: Reviewed.

## 2024-01-18 NOTE — ED PROVIDER NOTE - ATTENDING CONTRIBUTION TO CARE
43yF ?MS (awaiting confirmatory MRI tomorrow), TIA PE not currently taking her a/c diverticulitis p/w CP x 2d w/ generalized weakness.  On ED MD evaluation, pt endorses R hand and L leg numbness x 5 hours which she says might be related to MS.

## 2024-01-18 NOTE — CONSULT NOTE ADULT - ASSESSMENT
This is a 43 year old female who presents as a stroke code for LUE weakness RLE numbness, blurred vision. Exact LKW unknown but estimated at 3hours PTA. Symptoms i/s/o chest pain and generalized weakness x2 days. Initial stroke work-up unremarkable. Symptoms not consistent with clear localization and inconsistent medical history favor functional neurologic disorder, however will need to r/o other pathology.     Recommendations  Cardiac work-up and infectious/metabolic work-up per ED team  If work-up is unrevealing and no further medical work-up needed, can place in observation for MRI brain noncon      Discussed with Dr. Villaseñor

## 2024-01-18 NOTE — ED PROVIDER NOTE - PROGRESS NOTE DETAILS
AE: Laboratory initially resulted an elevated D-dimer; lab then called to inform us that the value is falsely elevated as the blood in the blue top was clotted.  Will redraw. AE: Code stroke called due to objective deficits found on exam that began 5 hours ago.

## 2024-01-18 NOTE — ED PROVIDER NOTE - CLINICAL SUMMARY MEDICAL DECISION MAKING FREE TEXT BOX
43yF TIA PE diverticulitis ?MS p/w CP and 5hr of facial and leg numbness.  Stroke code called on arrival given new onset neuro sx, though more likely MS.  Labs reassuring.  EKG w/o new ischemia or arrhythmia.  CTH w/o acute pathology.  CTA and CTP obtained as per stroke protocol, though later obtained CTA chest as well given elevated d-dimer, CP and hx PE now off a/c.  CT imaging w/o acute pathology.  Neuro recommend obs for MRI.  Will place in obs and recommend restarting her home meds, including a/c.

## 2024-01-18 NOTE — ED ADULT TRIAGE NOTE - CHIEF COMPLAINT QUOTE
Pt sent in by urgent care for chest pain and SOB. As per pt she stopped taking her plavix. hx of pe. Pt also c/o body aches and congestion

## 2024-01-18 NOTE — ED PROVIDER NOTE - OBJECTIVE STATEMENT
43-year-old female past medical history of PE, TIA, has not been on anticoagulation for approximately 1 month, diverticulitis, presents to the ED complaining of 2 days of chest pressure that turned into chest pain.  Patient also began to feel generalized weakness yesterday.  Then today, approximately 5 hours prior to arrival, patient began to feel numbness in the right hand and left leg.  Patient reports her PMD stated concern for MS and she was scheduled to have an MRI tomorrow.  Patient has not had any recent fever, headache, dizziness, vomiting, or diarrhea.

## 2024-01-19 PROCEDURE — 93970 EXTREMITY STUDY: CPT | Mod: 26

## 2024-01-19 PROCEDURE — 99233 SBSQ HOSP IP/OBS HIGH 50: CPT

## 2024-01-19 PROCEDURE — 70553 MRI BRAIN STEM W/O & W/DYE: CPT | Mod: 26,MA

## 2024-01-19 RX ORDER — CEFPODOXIME PROXETIL 100 MG
100 TABLET ORAL EVERY 12 HOURS
Refills: 0 | Status: DISCONTINUED | OUTPATIENT
Start: 2024-01-19 | End: 2024-01-20

## 2024-01-19 RX ORDER — FAMOTIDINE 10 MG/ML
20 INJECTION INTRAVENOUS ONCE
Refills: 0 | Status: COMPLETED | OUTPATIENT
Start: 2024-01-19 | End: 2024-01-19

## 2024-01-19 RX ORDER — KETOROLAC TROMETHAMINE 30 MG/ML
15 SYRINGE (ML) INJECTION ONCE
Refills: 0 | Status: DISCONTINUED | OUTPATIENT
Start: 2024-01-19 | End: 2024-01-19

## 2024-01-19 RX ORDER — CEFPODOXIME PROXETIL 100 MG
100 TABLET ORAL ONCE
Refills: 0 | Status: COMPLETED | OUTPATIENT
Start: 2024-01-19 | End: 2024-01-19

## 2024-01-19 RX ORDER — ACETAMINOPHEN 500 MG
650 TABLET ORAL ONCE
Refills: 0 | Status: COMPLETED | OUTPATIENT
Start: 2024-01-19 | End: 2024-01-19

## 2024-01-19 RX ADMIN — Medication 15 MILLIGRAM(S): at 11:30

## 2024-01-19 RX ADMIN — Medication 15 MILLIGRAM(S): at 10:45

## 2024-01-19 RX ADMIN — Medication 650 MILLIGRAM(S): at 18:25

## 2024-01-19 RX ADMIN — Medication 100 MILLIGRAM(S): at 10:41

## 2024-01-19 RX ADMIN — FAMOTIDINE 104 MILLIGRAM(S): 10 INJECTION INTRAVENOUS at 10:45

## 2024-01-19 RX ADMIN — Medication 30 MILLILITER(S): at 10:41

## 2024-01-19 RX ADMIN — Medication 100 MILLIGRAM(S): at 18:25

## 2024-01-19 NOTE — CHART NOTE - NSCHARTNOTEFT_GEN_A_CORE
< from: MR Head w/wo IV Cont (01.19.24 @ 12:26) >      ACC: [id number] EXAM:  MR BRAIN WAW IC   ORDERED BY: JOSE MANUEL HUNT     PROCEDURE DATE:  01/19/2024          INTERPRETATION:  CLINICAL INDICATION: Left upper extremity weakness.    TECHNIQUE: Multi-planar multi-sequential MR imaging of the brain was   performed before and after the intravenous administration of 10 ml of   Gadavist.    COMPARISON: MR brain dated 10/26/2021.    FINDINGS:    There are scattered patchy T2/FLAIR hyperintensities in bilateral   periventricular/subcortical cerebral white matter.    No acute infarction, intracranial hemorrhage or mass. No abnormal   intracranial enhancement is identified.    There is no evidence of hydrocephalus. There are no extra-axial fluid   collections. The skull base flow voids are present.    The visualized intraorbital contents are normal. There are small   retention cysts versus polyps in bilateral maxillary sinuses. There is   moderate mucosal thickening in bilateral ethmoid sinuses. Mild left   mastoid effusion. The visualized osseous structures appear normal.    There is redemonstrated nasopharyngeal soft tissue edema.      IMPRESSION:    No acute intracranial pathology or abnormal enhancement.    Nonspecific mild scattered foci of white matter signal abnormality which   may beseen with chronic microvascular ischemic changes, demyelinating   disease, migraine, or sequelae of remote inflammation/infection.    Redemonstrated nasopharyngeal edema.    --- End of Report ---    < end of copied text >    Recommendations:   -Reviewed MRI images w/ attending  -Recommend MRI C spine with and without contrast while in ED obs  -Please notify neurology w/ final results  -Further recommendations in initial consult note    Discussed w/ Dr. Villaseñor

## 2024-01-19 NOTE — ED CDU PROVIDER INITIAL DAY NOTE - PROGRESS NOTE DETAILS
Patient awaiting MRI. Will continue to monitor MRI results noted, awaiting neuro recommendations. Seen by neuro, recommending MR C-spine received signout from Alexei Walden pt in obs for evaluation right received signout from Alexei Goldsmith - pt in obs for evaluation right sided weakness; pt seen by neuro team - recommend mri c-spine with/without contrast;

## 2024-01-19 NOTE — ED CDU PROVIDER INITIAL DAY NOTE - ATTENDING APP SHARED VISIT CONTRIBUTION OF CARE
43-year-old female past medical history of PE, TIA, has not been on anticoagulation for approximately 1 month, diverticulitis,  pt presents for eval of multiple complaints. pt states she had 2 days of chest pain/pressure. pt then developed generalized weakness, sore throat.  no cough, ap, vomiting, diarrhea. ~5 hrs pta, pt developed tingling to RLE, LUE, b/l blurry vision and change in hearing.  neuro sx have since resolved and pt states she now primarily feels generalized weakness, st, body aches.    in ED,stroke code was called. cth/a/p w/o acute central finding. pharyngitis noted. cta chest w/o pe or acute chest/upper abd finding. pt placed in obs for further monitoring and w/u. of note, no vertiginous dizziness or ataxia.      vss  gen- NAD, aaox3  card-rrr  lungs-ctab, no wheezing or rhonchi  abd-sntnd, no guarding or rebound  neuro- full str/sensation, cn ii-xii grossly intact, normal coordination, normal speech and coordination  HENT- no lip/tongue/uvula  swelling, mild erythema to b/l tonsils.  no exudates on tonsils, uvula midline, base of tongue normal, pt tolerating secretions. no drooling, no stridor.  no hot potato voice  Ears-   L ear- external exam normal- no erythema, ecchymosis, fluctuance, rash, vesicles, or laceration to external ear; ear canal w/o erythema, lesions or debris; TM w/o erythema, bulging or vesicles, cone of light normal; no tenderness or erythema to mastoid  R ear- external exam normal- no erythema, ecchymosis, fluctuance, rash, vesicles, or laceration to external ear; ear canal w/o erythema, lesions or debris; TM w/o erythema, bulging or vesicles, cone of light normal; no tenderness or erythema to mastoid

## 2024-01-20 VITALS
RESPIRATION RATE: 18 BRPM | SYSTOLIC BLOOD PRESSURE: 118 MMHG | DIASTOLIC BLOOD PRESSURE: 59 MMHG | OXYGEN SATURATION: 100 % | HEART RATE: 78 BPM | TEMPERATURE: 98 F

## 2024-01-20 LAB
CULTURE RESULTS: SIGNIFICANT CHANGE UP
SPECIMEN SOURCE: SIGNIFICANT CHANGE UP

## 2024-01-20 PROCEDURE — 72156 MRI NECK SPINE W/O & W/DYE: CPT | Mod: 26,MA

## 2024-01-20 PROCEDURE — 99238 HOSP IP/OBS DSCHRG MGMT 30/<: CPT

## 2024-01-20 RX ORDER — CEFPODOXIME PROXETIL 100 MG
1 TABLET ORAL
Qty: 14 | Refills: 0
Start: 2024-01-20 | End: 2024-01-26

## 2024-01-20 RX ORDER — CEFDINIR 250 MG/5ML
1 POWDER, FOR SUSPENSION ORAL
Qty: 14 | Refills: 0
Start: 2024-01-20 | End: 2024-01-26

## 2024-01-20 RX ORDER — ACETAMINOPHEN 500 MG
650 TABLET ORAL ONCE
Refills: 0 | Status: COMPLETED | OUTPATIENT
Start: 2024-01-20 | End: 2024-01-20

## 2024-01-20 RX ORDER — SENNA PLUS 8.6 MG/1
2 TABLET ORAL ONCE
Refills: 0 | Status: COMPLETED | OUTPATIENT
Start: 2024-01-20 | End: 2024-01-20

## 2024-01-20 RX ADMIN — Medication 650 MILLIGRAM(S): at 15:36

## 2024-01-20 RX ADMIN — Medication 650 MILLIGRAM(S): at 06:57

## 2024-01-20 RX ADMIN — Medication 100 MILLIGRAM(S): at 06:39

## 2024-01-20 RX ADMIN — SENNA PLUS 2 TABLET(S): 8.6 TABLET ORAL at 06:57

## 2024-01-20 NOTE — ED CDU PROVIDER DISPOSITION NOTE - CLINICAL COURSE
43-year-old woman, history of TIA, PE, not currently on anticoagulation, was placed in CDU for workup of chest discomfort as well as neurosymptoms.  ED workup with CTA was negative for PE, Noncon CT and CTA unremarkable.  Patient was evaluated by neurology, recommended MS rule out.  Vital signs, exam as noted, patient is comfortable on my eval.  MRI negative for acute pathology. Pt d/w home w abx for pharyngeal edema noted incidentally, she will f/u with PMD, pain management, neuro.

## 2024-01-20 NOTE — ED CDU PROVIDER DISPOSITION NOTE - CARE PROVIDER_API CALL
Zaid Gupta .  Internal Medicine  [address] [place] [address]  Phone: [phone]  Fax: [phone]  Established Patient  Follow Up Time: 4-6 Days    Nayla Preston  Anesthesiology  [address] [place] [id number]  Phone: [phone]  Fax: [phone]  Follow Up Time: 4-6 Days

## 2024-01-20 NOTE — ED CDU PROVIDER SUBSEQUENT DAY NOTE - PROGRESS NOTE DETAILS
patient signed out from yun perez, no complaint , awaiting mri will continue to monitor patient comfortable, no complaint mri neck within  normal limits, head ct within normal limits patient will d/c home with follow up case d/w luther

## 2024-01-20 NOTE — ED CDU PROVIDER DISPOSITION NOTE - PATIENT PORTAL LINK FT
You can access the FollowMyHealth Patient Portal offered by [facility] by registering at the following website: [email or web address]. By joining [employer]’s FollowMyHealth portal, you will also be able to view your health information using other applications (apps) compatible with our system.

## 2024-01-20 NOTE — ED CDU PROVIDER SUBSEQUENT DAY NOTE - CLINICAL SUMMARY MEDICAL DECISION MAKING FREE TEXT BOX
43-year-old woman, history of TIA, PE, not currently on anticoagulation, was placed in CDU for workup of chest discomfort as well as neurosymptoms.  ED workup with CTA was negative for PE, Noncon CT and CTA unremarkable.  Patient was evaluated by neurology, recommended MS rule out.  Vital signs, exam as noted, patient is comfortable on my eval.  Follow-up imaging and dispo accordingly.

## 2024-01-20 NOTE — ED CDU PROVIDER DISPOSITION NOTE - PROVIDER TOKENS
PROVIDER:[TOKEN:[[id number]],FOLLOWUP:[4-6 Days],ESTABLISHEDPATIENT:[T]],PROVIDER:[TOKEN:[[id number]],FOLLOWUP:[4-6 Days]]

## 2024-02-02 ENCOUNTER — LABORATORY RESULT (OUTPATIENT)
Age: 44
End: 2024-02-02

## 2024-02-02 ENCOUNTER — APPOINTMENT (OUTPATIENT)
Dept: PULMONOLOGY | Facility: CLINIC | Age: 44
End: 2024-02-02
Payer: MEDICAID

## 2024-02-02 VITALS
RESPIRATION RATE: 14 BRPM | WEIGHT: 22 LBS | OXYGEN SATURATION: 99 % | HEIGHT: 66 IN | BODY MASS INDEX: 3.54 KG/M2 | SYSTOLIC BLOOD PRESSURE: 110 MMHG | HEART RATE: 69 BPM | DIASTOLIC BLOOD PRESSURE: 70 MMHG

## 2024-02-02 DIAGNOSIS — G47.33 OBSTRUCTIVE SLEEP APNEA (ADULT) (PEDIATRIC): ICD-10-CM

## 2024-02-02 DIAGNOSIS — R06.02 SHORTNESS OF BREATH: ICD-10-CM

## 2024-02-02 LAB
BASOPHILS # BLD AUTO: 0.03 K/UL
BASOPHILS NFR BLD AUTO: 0.6 %
EOSINOPHIL # BLD AUTO: 0.11 K/UL
EOSINOPHIL NFR BLD AUTO: 2.2 %
HCT VFR BLD CALC: 39.5 %
HGB BLD-MCNC: 12.2 G/DL
IMM GRANULOCYTES NFR BLD AUTO: 0.4 %
LYMPHOCYTES # BLD AUTO: 2.16 K/UL
LYMPHOCYTES NFR BLD AUTO: 43.2 %
MAN DIFF?: NORMAL
MCHC RBC-ENTMCNC: 28.7 PG
MCHC RBC-ENTMCNC: 30.9 G/DL
MCV RBC AUTO: 92.9 FL
MONOCYTES # BLD AUTO: 0.35 K/UL
MONOCYTES NFR BLD AUTO: 7 %
NEUTROPHILS # BLD AUTO: 2.33 K/UL
NEUTROPHILS NFR BLD AUTO: 46.6 %
PLATELET # BLD AUTO: 248 K/UL
PMV BLD AUTO: 0 /100 WBCS
RBC # BLD: 4.25 M/UL
RBC # FLD: 13.2 %
WBC # FLD AUTO: 5 K/UL

## 2024-02-02 PROCEDURE — G2211 COMPLEX E/M VISIT ADD ON: CPT | Mod: NC,1L

## 2024-02-02 PROCEDURE — 99214 OFFICE O/P EST MOD 30 MIN: CPT

## 2024-02-02 NOTE — PLAN
[TextEntry] : In the setting of significant bronchodilator response on the PFT will start the patient on Symbicort for now told to take it every day twice not as needed Albuterol as needed Will proceed with a sleep study to the and sleep lab patient complaining of daytime somnolence fatigue snoring Home study was negative Counseled for weight reduction

## 2024-02-02 NOTE — HISTORY OF PRESENT ILLNESS
[TextBox_4] : Patient coming for follow-up status post PFT was poor effort but it did show that she has significant bronchodilator response She says still complaining of some shortness of breath on exertion she is not using the albuterol she said that she had to have tach that she felt pain in the chest and difficult breathing again went to the hospital had a CT angiogram showed no PE Result of the CAT scan was reviewed no lung pathology Status post home study was negative patient still complaining of daytime fatigue somnolence snoring at night wake up multiple at night Told her We have to proceed with sleep study to be done in the sleep lab Patient also been worked up for?  MS as per patient

## 2024-02-05 LAB
A ALTERNATA IGE QN: <0.1 KUA/L
A FUMIGATUS IGE QN: <0.1 KUA/L
C ALBICANS IGE QN: <0.1 KUA/L
C HERBARUM IGE QN: <0.1 KUA/L
CAT DANDER IGE QN: <0.1 KUA/L
COMMON RAGWEED IGE QN: <0.1 KUA/L
D FARINAE IGE QN: <0.1 KUA/L
D PTERONYSS IGE QN: <0.1 KUA/L
DEPRECATED A ALTERNATA IGE RAST QL: 0 (ref 0–?)
DEPRECATED A FUMIGATUS IGE RAST QL: 0 (ref 0–?)
DEPRECATED C ALBICANS IGE RAST QL: 0
DEPRECATED C HERBARUM IGE RAST QL: 0 (ref 0–?)
DEPRECATED CAT DANDER IGE RAST QL: 0 (ref 0–?)
DEPRECATED COMMON RAGWEED IGE RAST QL: 0 (ref 0–?)
DEPRECATED D FARINAE IGE RAST QL: 0 (ref 0–?)
DEPRECATED D PTERONYSS IGE RAST QL: 0 (ref 0–?)
DEPRECATED DOG DANDER IGE RAST QL: 0 (ref 0–?)
DEPRECATED M RACEMOSUS IGE RAST QL: 0
DEPRECATED ROACH IGE RAST QL: 0 (ref 0–?)
DEPRECATED TIMOTHY IGE RAST QL: 0 (ref 0–?)
DEPRECATED WHITE OAK IGE RAST QL: 0 (ref 0–?)
DOG DANDER IGE QN: <0.1 KUA/L
M RACEMOSUS IGE QN: <0.1 KUA/L
ROACH IGE QN: <0.1 KUA/L
TIMOTHY IGE QN: <0.1 KUA/L
TOTAL IGE SMQN RAST: 78 KU/L
WHITE OAK IGE QN: <0.1 KUA/L

## 2024-02-27 ENCOUNTER — RX RENEWAL (OUTPATIENT)
Age: 44
End: 2024-02-27

## 2024-03-07 NOTE — ED ADULT NURSE NOTE - CHIEF COMPLAINT QUOTE
You will get a call from [facility] to schedule your in-lab PSG.   Once the test is completed, a physician will read the study.    You will be contacted from our office to discuss results, get started on PAP therapy, or schedule a follow up visit to discuss treatment options.       Short of breath, cough, seen by C 10 days ago placed on clarithromycin

## 2024-03-08 ENCOUNTER — APPOINTMENT (OUTPATIENT)
Dept: SLEEP CENTER | Facility: HOSPITAL | Age: 44
End: 2024-03-08
Payer: MEDICAID

## 2024-03-08 ENCOUNTER — OUTPATIENT (OUTPATIENT)
Dept: OUTPATIENT SERVICES | Facility: HOSPITAL | Age: 44
LOS: 1 days | Discharge: HOME | End: 2024-03-08
Payer: MEDICAID

## 2024-03-08 DIAGNOSIS — Z98.891 HISTORY OF UTERINE SCAR FROM PREVIOUS SURGERY: Chronic | ICD-10-CM

## 2024-03-08 DIAGNOSIS — Z90.49 ACQUIRED ABSENCE OF OTHER SPECIFIED PARTS OF DIGESTIVE TRACT: Chronic | ICD-10-CM

## 2024-03-08 DIAGNOSIS — G47.33 OBSTRUCTIVE SLEEP APNEA (ADULT) (PEDIATRIC): ICD-10-CM

## 2024-03-08 PROCEDURE — 95810 POLYSOM 6/> YRS 4/> PARAM: CPT

## 2024-03-08 PROCEDURE — 95810 POLYSOM 6/> YRS 4/> PARAM: CPT | Mod: 26

## 2024-03-10 DIAGNOSIS — G47.33 OBSTRUCTIVE SLEEP APNEA (ADULT) (PEDIATRIC): ICD-10-CM

## 2024-05-08 ENCOUNTER — APPOINTMENT (OUTPATIENT)
Dept: PULMONOLOGY | Facility: CLINIC | Age: 44
End: 2024-05-08
Payer: MEDICAID

## 2024-05-08 VITALS
HEART RATE: 72 BPM | RESPIRATION RATE: 14 BRPM | HEIGHT: 66 IN | BODY MASS INDEX: 36 KG/M2 | DIASTOLIC BLOOD PRESSURE: 80 MMHG | SYSTOLIC BLOOD PRESSURE: 126 MMHG | OXYGEN SATURATION: 99 % | WEIGHT: 224 LBS

## 2024-05-08 DIAGNOSIS — J45.909 UNSPECIFIED ASTHMA, UNCOMPLICATED: ICD-10-CM

## 2024-05-08 PROCEDURE — 99213 OFFICE O/P EST LOW 20 MIN: CPT

## 2024-05-08 NOTE — HISTORY OF PRESENT ILLNESS
[TextBox_4] : Patient coming for follow-up overall she is doing great no cough wheezing or shortness of breath been off Symbicort Status post sleep study result reviewed was negative for sleep apnea result discussed with patient asthma profile IgE negative

## 2024-05-20 ENCOUNTER — RX RENEWAL (OUTPATIENT)
Age: 44
End: 2024-05-20

## 2024-05-20 RX ORDER — BUDESONIDE AND FORMOTEROL FUMARATE DIHYDRATE 80; 4.5 UG/1; UG/1
80-4.5 AEROSOL RESPIRATORY (INHALATION)
Qty: 10.2 | Refills: 2 | Status: ACTIVE | COMMUNITY
Start: 2024-02-02 | End: 1900-01-01

## 2024-05-20 RX ORDER — ALBUTEROL SULFATE 90 UG/1
108 (90 BASE) INHALANT RESPIRATORY (INHALATION)
Qty: 1 | Refills: 1 | Status: ACTIVE | COMMUNITY
Start: 2023-11-10 | End: 1900-01-01

## 2024-06-01 ENCOUNTER — NON-APPOINTMENT (OUTPATIENT)
Age: 44
End: 2024-06-01

## 2024-07-23 ENCOUNTER — APPOINTMENT (OUTPATIENT)
Dept: ORTHOPEDIC SURGERY | Facility: CLINIC | Age: 44
End: 2024-07-23

## 2024-08-15 NOTE — ED ADULT TRIAGE NOTE - NS ED NURSE DIRECT TO ROOM YN
AxO4 - drowsy, arousable. VSS on RA. R limb alert - AV fistula. DESI no CPAP. On tele-NSR. Anticoags on hold. See MAR for blood glucose control - on QID accuchecks and degludec. Denies nausea, large BM tonight 8/14. Anuric on HD (M,W,F) - will have HD Thurs 8/15. Denies pain during shift - PRN meds available. WBAT w/ post-op shoe. Mod-max x2 w/ walker. Updated on POC. Safety measures placed. Care ongoing.   Yes

## 2024-11-08 ENCOUNTER — APPOINTMENT (OUTPATIENT)
Dept: PULMONOLOGY | Facility: CLINIC | Age: 44
End: 2024-11-08
Payer: MEDICAID

## 2024-11-08 VITALS
SYSTOLIC BLOOD PRESSURE: 104 MMHG | HEIGHT: 66 IN | BODY MASS INDEX: 36.96 KG/M2 | WEIGHT: 230 LBS | HEART RATE: 72 BPM | OXYGEN SATURATION: 95 % | RESPIRATION RATE: 13 BRPM | DIASTOLIC BLOOD PRESSURE: 68 MMHG

## 2024-11-08 DIAGNOSIS — G47.33 OBSTRUCTIVE SLEEP APNEA (ADULT) (PEDIATRIC): ICD-10-CM

## 2024-11-08 DIAGNOSIS — R09.82 POSTNASAL DRIP: ICD-10-CM

## 2024-11-08 DIAGNOSIS — J45.909 UNSPECIFIED ASTHMA, UNCOMPLICATED: ICD-10-CM

## 2024-11-08 PROCEDURE — 99213 OFFICE O/P EST LOW 20 MIN: CPT

## 2024-11-08 PROCEDURE — G2211 COMPLEX E/M VISIT ADD ON: CPT | Mod: NC

## 2024-11-08 RX ORDER — AZELASTINE HYDROCHLORIDE 137 UG/1
0.1 SPRAY, METERED NASAL
Qty: 1 | Refills: 3 | Status: ACTIVE | COMMUNITY
Start: 2024-11-08 | End: 1900-01-01

## 2024-11-21 ENCOUNTER — OUTPATIENT (OUTPATIENT)
Dept: OUTPATIENT SERVICES | Facility: HOSPITAL | Age: 44
LOS: 1 days | End: 2024-11-21
Payer: MEDICAID

## 2024-11-21 ENCOUNTER — APPOINTMENT (OUTPATIENT)
Dept: PULMONOLOGY | Facility: HOSPITAL | Age: 44
End: 2024-11-21
Payer: MEDICAID

## 2024-11-21 DIAGNOSIS — R06.02 SHORTNESS OF BREATH: ICD-10-CM

## 2024-11-21 DIAGNOSIS — Z98.891 HISTORY OF UTERINE SCAR FROM PREVIOUS SURGERY: Chronic | ICD-10-CM

## 2024-11-21 DIAGNOSIS — Z90.49 ACQUIRED ABSENCE OF OTHER SPECIFIED PARTS OF DIGESTIVE TRACT: Chronic | ICD-10-CM

## 2024-11-21 PROCEDURE — 94726 PLETHYSMOGRAPHY LUNG VOLUMES: CPT

## 2024-11-21 PROCEDURE — 94729 DIFFUSING CAPACITY: CPT | Mod: 26

## 2024-11-21 PROCEDURE — 94664 DEMO&/EVAL PT USE INHALER: CPT

## 2024-11-21 PROCEDURE — 94060 EVALUATION OF WHEEZING: CPT | Mod: 26

## 2024-11-21 PROCEDURE — 94070 EVALUATION OF WHEEZING: CPT

## 2024-11-21 PROCEDURE — 94727 GAS DIL/WSHOT DETER LNG VOL: CPT | Mod: 26

## 2024-11-21 PROCEDURE — 94729 DIFFUSING CAPACITY: CPT

## 2024-12-03 ENCOUNTER — RX RENEWAL (OUTPATIENT)
Age: 44
End: 2024-12-03

## 2024-12-13 ENCOUNTER — NON-APPOINTMENT (OUTPATIENT)
Age: 44
End: 2024-12-13

## 2025-02-10 ENCOUNTER — NON-APPOINTMENT (OUTPATIENT)
Age: 45
End: 2025-02-10

## 2025-03-16 ENCOUNTER — NON-APPOINTMENT (OUTPATIENT)
Age: 45
End: 2025-03-16

## 2025-03-21 ENCOUNTER — RX RENEWAL (OUTPATIENT)
Age: 45
End: 2025-03-21

## 2025-03-21 RX ORDER — AZELASTINE HYDROCHLORIDE 137 UG/1
137 SPRAY, METERED NASAL
Qty: 30 | Refills: 2 | Status: ACTIVE | COMMUNITY
Start: 2025-03-21 | End: 1900-01-01

## 2025-04-11 NOTE — ED PROVIDER NOTE - IV ALTEPLASE EXCL ABS HIDDEN
"Subjective   History was provided by the mother.  Mook Payton is a 6 y.o. male who is here for this well-child visit.    Peling feet and he picks them.      Current Issues:  Current concerns include none  Hearing or vision concerns?  No  Sees dentist regularly and brushes teeth at least daily?  yes    Review of Nutrition, Elimination, and Sleep:  Balanced diet? yes  Problems with bowels or bladder?  no  Night accidents? no  Sleeping well?  yes  Obesity?  no    Social Screening:  School performance: doing well; no concerns  Discipline concerns? no  Concerns regarding behavior with peers? no  Secondhand smoke exposure? no    Objective   Visit Vitals  /62   Pulse 70   Temp 36.6 °C (97.8 °F)   Resp 20   Ht (!) 1.073 m (3' 6.25\")   Wt (!) 16.6 kg   BMI 14.42 kg/m²   Smoking Status Never   BSA 0.7 m²       Growth parameters are noted and are appropriate for age.    General:   alert and oriented, in no acute distress   Gait:   normal   Skin:   normal   Oral cavity:   lips, mucosa, and tongue normal; teeth and gums normal   Eyes:   sclerae white, pupils equal and reactive   Ears:   normal bilaterally   Neck:   no adenopathy   Lungs:  clear to auscultation bilaterally   Heart:   regular rate and rhythm, S1, S2 normal, no murmur, click, rub or gallop   Abdomen:  soft, non-tender; bowel sounds normal; no masses, no organomegaly   :  normal male - testes descended bilaterally   Extremities:   extremities normal, warm and well-perfused; no cyanosis, clubbing, or edema   Neuro:  normal without focal findings and muscle tone and strength normal and symmetric     Patient has some mild diffuse peeling of the superficial skin of his toes especially on the plantar side.  There is no erythema or swelling or tenderness.    Hearing Screening    500Hz 1000Hz 2000Hz 4000Hz   Right ear 25 20 20 20   Left ear 25 20 20 20     Vision Screening    Right eye Left eye Both eyes   Without correction 20/20 20/30 20/20   With correction    "       Assessment/Plan   Healthy 6 y.o. male child.  1.  Age appropriate anticipatory guidance discussed.  Normal growth and development reviewed.  Reviewed normal and healthy diet.  General care questions were addressed.  2.  Normal growth. The patient was counseled regarding nutrition and physical activity.  3. Development: appropriate for age  4. Vaccines --up-to-date.  5. Return in 1 year for next well child exam or earlier with concerns.      Anticipatory Guidance      Start every day with breakfast.    Buy fat-free milk and low-fat dairy foods, and encourage 3 servings each day.    Limit soft drinks, juice, candy, chips, and high-fat food.    Include 5 servings of vegetables and fruits at meals and for snacks daily    Your child should always ride in the back seat and use a booster seat until the vehicle’s lap and shoulder belt fit.    Teach your child to swim and watch her in the water.    Use sunscreen when outside.    Know your child’s friends and their families.    Teach your child plans for emergencies such as a fire.    Give your child chores to do and expect them to be done.    Hug, praise, and take pride in your child for good behavior and doing well in school.    Be a good role model.    Don’t hit or allow others to hit.    Help your child to do things for himself.    Attend back-to-school night, parent-teacher events, and as many other school events as possible.    1. Encounter for routine child health examination without abnormal findings  Follow Up In [facility] - PCP      2. Wellness examination  Follow Up In [facility] - PCP      3. Tinea pedis of both feet  ketoconazole (NIZOral) 2 % cream   Patient can use ketoconazole twice daily until cleared.       Orders Placed This Encounter      ketoconazole (NIZOral) 2 % cream       No orders of the defined types were placed in this encounter.       New Medications Ordered This Visit   Medications    ketoconazole (NIZOral) 2 % cream      Sig: Apply topically 2 times a day.     Dispense:  60 g     Refill:  0         show

## 2025-04-17 ENCOUNTER — RX RENEWAL (OUTPATIENT)
Age: 45
End: 2025-04-17

## 2025-04-23 ENCOUNTER — EMERGENCY (EMERGENCY)
Facility: HOSPITAL | Age: 45
LOS: 0 days | Discharge: HOME | End: 2025-04-23
Attending: EMERGENCY MEDICINE
Payer: MEDICAID

## 2025-04-23 VITALS
DIASTOLIC BLOOD PRESSURE: 58 MMHG | SYSTOLIC BLOOD PRESSURE: 126 MMHG | OXYGEN SATURATION: 100 % | HEART RATE: 70 BPM | RESPIRATION RATE: 18 BRPM

## 2025-04-23 VITALS
HEART RATE: 68 BPM | SYSTOLIC BLOOD PRESSURE: 128 MMHG | DIASTOLIC BLOOD PRESSURE: 61 MMHG | RESPIRATION RATE: 16 BRPM | TEMPERATURE: 97 F

## 2025-04-23 DIAGNOSIS — M79.671 PAIN IN RIGHT FOOT: ICD-10-CM

## 2025-04-23 DIAGNOSIS — Z88.0 ALLERGY STATUS TO PENICILLIN: ICD-10-CM

## 2025-04-23 DIAGNOSIS — Z88.5 ALLERGY STATUS TO NARCOTIC AGENT: ICD-10-CM

## 2025-04-23 DIAGNOSIS — Z86.718 PERSONAL HISTORY OF OTHER VENOUS THROMBOSIS AND EMBOLISM: ICD-10-CM

## 2025-04-23 DIAGNOSIS — R19.7 DIARRHEA, UNSPECIFIED: ICD-10-CM

## 2025-04-23 DIAGNOSIS — R05.1 ACUTE COUGH: ICD-10-CM

## 2025-04-23 DIAGNOSIS — R07.89 OTHER CHEST PAIN: ICD-10-CM

## 2025-04-23 DIAGNOSIS — Z86.711 PERSONAL HISTORY OF PULMONARY EMBOLISM: ICD-10-CM

## 2025-04-23 DIAGNOSIS — Z90.49 ACQUIRED ABSENCE OF OTHER SPECIFIED PARTS OF DIGESTIVE TRACT: Chronic | ICD-10-CM

## 2025-04-23 DIAGNOSIS — J02.9 ACUTE PHARYNGITIS, UNSPECIFIED: ICD-10-CM

## 2025-04-23 DIAGNOSIS — Z98.891 HISTORY OF UTERINE SCAR FROM PREVIOUS SURGERY: Chronic | ICD-10-CM

## 2025-04-23 LAB
ALBUMIN SERPL ELPH-MCNC: 4.1 G/DL — SIGNIFICANT CHANGE UP (ref 3.5–5.2)
ALP SERPL-CCNC: 60 U/L — SIGNIFICANT CHANGE UP (ref 30–115)
ALT FLD-CCNC: 13 U/L — SIGNIFICANT CHANGE UP (ref 0–41)
ANION GAP SERPL CALC-SCNC: 12 MMOL/L — SIGNIFICANT CHANGE UP (ref 7–14)
APTT BLD: 29.3 SEC — SIGNIFICANT CHANGE UP (ref 27–39.2)
AST SERPL-CCNC: 13 U/L — SIGNIFICANT CHANGE UP (ref 0–41)
BASE EXCESS BLDV CALC-SCNC: -2.1 MMOL/L — LOW (ref -2–3)
BASOPHILS # BLD AUTO: 0.02 K/UL — SIGNIFICANT CHANGE UP (ref 0–0.2)
BASOPHILS NFR BLD AUTO: 0.5 % — SIGNIFICANT CHANGE UP (ref 0–1)
BILIRUB SERPL-MCNC: 0.3 MG/DL — SIGNIFICANT CHANGE UP (ref 0.2–1.2)
BUN SERPL-MCNC: 14 MG/DL — SIGNIFICANT CHANGE UP (ref 10–20)
CA-I SERPL-SCNC: 1.16 MMOL/L — SIGNIFICANT CHANGE UP (ref 1.15–1.33)
CALCIUM SERPL-MCNC: 9.2 MG/DL — SIGNIFICANT CHANGE UP (ref 8.4–10.5)
CHLORIDE SERPL-SCNC: 108 MMOL/L — SIGNIFICANT CHANGE UP (ref 98–110)
CK SERPL-CCNC: 87 U/L — SIGNIFICANT CHANGE UP (ref 0–225)
CO2 SERPL-SCNC: 20 MMOL/L — SIGNIFICANT CHANGE UP (ref 17–32)
CREAT SERPL-MCNC: 0.7 MG/DL — SIGNIFICANT CHANGE UP (ref 0.7–1.5)
D DIMER BLD IA.RAPID-MCNC: 203 NG/ML DDU — SIGNIFICANT CHANGE UP
EGFR: 109 ML/MIN/1.73M2 — SIGNIFICANT CHANGE UP
EGFR: 109 ML/MIN/1.73M2 — SIGNIFICANT CHANGE UP
EOSINOPHIL # BLD AUTO: 0.1 K/UL — SIGNIFICANT CHANGE UP (ref 0–0.7)
EOSINOPHIL NFR BLD AUTO: 2.3 % — SIGNIFICANT CHANGE UP (ref 0–8)
FLUAV AG NPH QL: SIGNIFICANT CHANGE UP
FLUBV AG NPH QL: SIGNIFICANT CHANGE UP
GAS PNL BLDV: 134 MMOL/L — LOW (ref 136–145)
GAS PNL BLDV: SIGNIFICANT CHANGE UP
GLUCOSE SERPL-MCNC: 84 MG/DL — SIGNIFICANT CHANGE UP (ref 70–99)
HCG SERPL QL: NEGATIVE — SIGNIFICANT CHANGE UP
HCO3 BLDV-SCNC: 24 MMOL/L — SIGNIFICANT CHANGE UP (ref 22–29)
HCT VFR BLD CALC: 35.8 % — LOW (ref 37–47)
HCT VFR BLDA CALC: 62 % — CRITICAL HIGH (ref 34.5–46.5)
HGB BLD CALC-MCNC: >20 G/DL — CRITICAL HIGH (ref 11.7–16.1)
HGB BLD-MCNC: 11.8 G/DL — LOW (ref 12–16)
IMM GRANULOCYTES NFR BLD AUTO: 0.5 % — HIGH (ref 0.1–0.3)
INR BLD: 0.99 RATIO — SIGNIFICANT CHANGE UP (ref 0.65–1.3)
LACTATE BLDV-MCNC: 0.9 MMOL/L — SIGNIFICANT CHANGE UP (ref 0.5–2)
LYMPHOCYTES # BLD AUTO: 1.66 K/UL — SIGNIFICANT CHANGE UP (ref 1.2–3.4)
LYMPHOCYTES # BLD AUTO: 38.5 % — SIGNIFICANT CHANGE UP (ref 20.5–51.1)
MAGNESIUM SERPL-MCNC: 1.9 MG/DL — SIGNIFICANT CHANGE UP (ref 1.8–2.4)
MCHC RBC-ENTMCNC: 29.6 PG — SIGNIFICANT CHANGE UP (ref 27–31)
MCHC RBC-ENTMCNC: 33 G/DL — SIGNIFICANT CHANGE UP (ref 32–37)
MCV RBC AUTO: 89.9 FL — SIGNIFICANT CHANGE UP (ref 81–99)
MONOCYTES # BLD AUTO: 0.36 K/UL — SIGNIFICANT CHANGE UP (ref 0.1–0.6)
MONOCYTES NFR BLD AUTO: 8.4 % — SIGNIFICANT CHANGE UP (ref 1.7–9.3)
NEUTROPHILS # BLD AUTO: 2.15 K/UL — SIGNIFICANT CHANGE UP (ref 1.4–6.5)
NEUTROPHILS NFR BLD AUTO: 49.8 % — SIGNIFICANT CHANGE UP (ref 42.2–75.2)
NRBC BLD AUTO-RTO: 0 /100 WBCS — SIGNIFICANT CHANGE UP (ref 0–0)
NT-PROBNP SERPL-SCNC: 68 PG/ML — SIGNIFICANT CHANGE UP (ref 0–300)
PCO2 BLDV: 43 MMHG — HIGH (ref 39–42)
PH BLDV: 7.35 — SIGNIFICANT CHANGE UP (ref 7.32–7.43)
PLATELET # BLD AUTO: 241 K/UL — SIGNIFICANT CHANGE UP (ref 130–400)
PMV BLD: 10.5 FL — HIGH (ref 7.4–10.4)
PO2 BLDV: 29 MMHG — SIGNIFICANT CHANGE UP (ref 25–45)
POTASSIUM BLDV-SCNC: 3.7 MMOL/L — SIGNIFICANT CHANGE UP (ref 3.5–5.1)
POTASSIUM SERPL-MCNC: 3.8 MMOL/L — SIGNIFICANT CHANGE UP (ref 3.5–5)
POTASSIUM SERPL-SCNC: 3.8 MMOL/L — SIGNIFICANT CHANGE UP (ref 3.5–5)
PROT SERPL-MCNC: 6.2 G/DL — SIGNIFICANT CHANGE UP (ref 6–8)
PROTHROM AB SERPL-ACNC: 11.7 SEC — SIGNIFICANT CHANGE UP (ref 9.95–12.87)
RBC # BLD: 3.98 M/UL — LOW (ref 4.2–5.4)
RBC # FLD: 12.5 % — SIGNIFICANT CHANGE UP (ref 11.5–14.5)
RSV RNA NPH QL NAA+NON-PROBE: SIGNIFICANT CHANGE UP
SAO2 % BLDV: 41.6 % — LOW (ref 67–88)
SARS-COV-2 RNA SPEC QL NAA+PROBE: SIGNIFICANT CHANGE UP
SODIUM SERPL-SCNC: 140 MMOL/L — SIGNIFICANT CHANGE UP (ref 135–146)
SOURCE RESPIRATORY: SIGNIFICANT CHANGE UP
TROPONIN T, HIGH SENSITIVITY RESULT: <6 NG/L — SIGNIFICANT CHANGE UP (ref 6–13)
WBC # BLD: 4.31 K/UL — LOW (ref 4.8–10.8)
WBC # FLD AUTO: 4.31 K/UL — LOW (ref 4.8–10.8)

## 2025-04-23 PROCEDURE — 93970 EXTREMITY STUDY: CPT

## 2025-04-23 PROCEDURE — 85610 PROTHROMBIN TIME: CPT

## 2025-04-23 PROCEDURE — 36415 COLL VENOUS BLD VENIPUNCTURE: CPT

## 2025-04-23 PROCEDURE — 36000 PLACE NEEDLE IN VEIN: CPT

## 2025-04-23 PROCEDURE — 83605 ASSAY OF LACTIC ACID: CPT

## 2025-04-23 PROCEDURE — 84295 ASSAY OF SERUM SODIUM: CPT

## 2025-04-23 PROCEDURE — 85730 THROMBOPLASTIN TIME PARTIAL: CPT

## 2025-04-23 PROCEDURE — 85379 FIBRIN DEGRADATION QUANT: CPT

## 2025-04-23 PROCEDURE — 71046 X-RAY EXAM CHEST 2 VIEWS: CPT

## 2025-04-23 PROCEDURE — 82330 ASSAY OF CALCIUM: CPT

## 2025-04-23 PROCEDURE — 0241U: CPT

## 2025-04-23 PROCEDURE — 84703 CHORIONIC GONADOTROPIN ASSAY: CPT

## 2025-04-23 PROCEDURE — 99285 EMERGENCY DEPT VISIT HI MDM: CPT

## 2025-04-23 PROCEDURE — 84132 ASSAY OF SERUM POTASSIUM: CPT

## 2025-04-23 PROCEDURE — 80053 COMPREHEN METABOLIC PANEL: CPT

## 2025-04-23 PROCEDURE — 83735 ASSAY OF MAGNESIUM: CPT

## 2025-04-23 PROCEDURE — 84484 ASSAY OF TROPONIN QUANT: CPT

## 2025-04-23 PROCEDURE — 85025 COMPLETE CBC W/AUTO DIFF WBC: CPT

## 2025-04-23 PROCEDURE — 93010 ELECTROCARDIOGRAM REPORT: CPT

## 2025-04-23 PROCEDURE — 82550 ASSAY OF CK (CPK): CPT

## 2025-04-23 PROCEDURE — 99285 EMERGENCY DEPT VISIT HI MDM: CPT | Mod: 25

## 2025-04-23 PROCEDURE — 71046 X-RAY EXAM CHEST 2 VIEWS: CPT | Mod: 26

## 2025-04-23 PROCEDURE — 85018 HEMOGLOBIN: CPT

## 2025-04-23 PROCEDURE — 85014 HEMATOCRIT: CPT

## 2025-04-23 PROCEDURE — 93970 EXTREMITY STUDY: CPT | Mod: 26

## 2025-04-23 PROCEDURE — 82803 BLOOD GASES ANY COMBINATION: CPT

## 2025-04-23 PROCEDURE — 83880 ASSAY OF NATRIURETIC PEPTIDE: CPT

## 2025-04-23 PROCEDURE — 93005 ELECTROCARDIOGRAM TRACING: CPT

## 2025-04-23 NOTE — ED PROVIDER NOTE - WR ORDER ID 1
In an effort to ensure that our patients LiveWell, a Team Member has reviewed your chart and identified an opportunity to provide the best care possible. An attempt was made to discuss or schedule due or overdue Preventive or Chronic Condition care.Care Gaps identified: Breast Cancer Screening.    The Outcome was Contact was not made, no answer/busy. We are attempting to schedule a mammogram appointment. If you have any questions or need help with scheduling, contact our Health Outreach Team at [phone].   Type of Appointment needed:  Mammo   [id number]

## 2025-04-23 NOTE — ED PROVIDER NOTE - WHICH SHOWED
EKG normal sinus rhythm 67 normal axis intervals and ST segments; chest x-ray no infiltrate no effusion or pneumothorax

## 2025-04-23 NOTE — ED PROVIDER NOTE - OBJECTIVE STATEMENT
patient c/o substernal chest pain , cough, chills past 3 days, now had lower leg and bilateral foot pain, H/o DVT / PE in the past due to BCP use

## 2025-04-23 NOTE — ED PROVIDER NOTE - CLINICAL SUMMARY MEDICAL DECISION MAKING FREE TEXT BOX
44-year-old female non-smoker remote history of provoked DVT/PE off anticoagulation complains of flulike symptoms with sore throat cough chest pain and now with foot and leg pain, no urinary symptoms, no recent travel, on exam vitals appreciated, well-appearing, neck supple, cor regular rate and rhythm, lungs CTA, abdomen soft nontender, no clubbing cyanosis or edema, pulses equal, neurologically intact, labs and studies appreciated, while in ED patient had diarrhea, suspect viral syndrome, will discharge supportive care and outpatient follow-up, counseled regarding conditions which should prompt return.

## 2025-05-15 ENCOUNTER — RX RENEWAL (OUTPATIENT)
Age: 45
End: 2025-05-15

## 2025-05-18 ENCOUNTER — EMERGENCY (EMERGENCY)
Facility: HOSPITAL | Age: 45
LOS: 0 days | Discharge: HOME | End: 2025-05-19
Attending: STUDENT IN AN ORGANIZED HEALTH CARE EDUCATION/TRAINING PROGRAM
Payer: MEDICAID

## 2025-05-18 VITALS
OXYGEN SATURATION: 98 % | SYSTOLIC BLOOD PRESSURE: 117 MMHG | HEART RATE: 117 BPM | WEIGHT: 212.97 LBS | TEMPERATURE: 101 F | RESPIRATION RATE: 20 BRPM | DIASTOLIC BLOOD PRESSURE: 74 MMHG

## 2025-05-18 DIAGNOSIS — Z90.49 ACQUIRED ABSENCE OF OTHER SPECIFIED PARTS OF DIGESTIVE TRACT: Chronic | ICD-10-CM

## 2025-05-18 DIAGNOSIS — B34.9 VIRAL INFECTION, UNSPECIFIED: ICD-10-CM

## 2025-05-18 DIAGNOSIS — R00.0 TACHYCARDIA, UNSPECIFIED: ICD-10-CM

## 2025-05-18 DIAGNOSIS — Z86.73 PERSONAL HISTORY OF TRANSIENT ISCHEMIC ATTACK (TIA), AND CEREBRAL INFARCTION WITHOUT RESIDUAL DEFICITS: ICD-10-CM

## 2025-05-18 DIAGNOSIS — Z88.0 ALLERGY STATUS TO PENICILLIN: ICD-10-CM

## 2025-05-18 DIAGNOSIS — Z86.711 PERSONAL HISTORY OF PULMONARY EMBOLISM: ICD-10-CM

## 2025-05-18 DIAGNOSIS — R73.03 PREDIABETES: ICD-10-CM

## 2025-05-18 DIAGNOSIS — Z88.5 ALLERGY STATUS TO NARCOTIC AGENT: ICD-10-CM

## 2025-05-18 DIAGNOSIS — Z86.718 PERSONAL HISTORY OF OTHER VENOUS THROMBOSIS AND EMBOLISM: ICD-10-CM

## 2025-05-18 DIAGNOSIS — R52 PAIN, UNSPECIFIED: ICD-10-CM

## 2025-05-18 DIAGNOSIS — Z98.891 HISTORY OF UTERINE SCAR FROM PREVIOUS SURGERY: Chronic | ICD-10-CM

## 2025-05-18 DIAGNOSIS — R50.9 FEVER, UNSPECIFIED: ICD-10-CM

## 2025-05-18 LAB
ALBUMIN SERPL ELPH-MCNC: 4.2 G/DL — SIGNIFICANT CHANGE UP (ref 3.5–5.2)
ALP SERPL-CCNC: 69 U/L — SIGNIFICANT CHANGE UP (ref 30–115)
ALT FLD-CCNC: 13 U/L — SIGNIFICANT CHANGE UP (ref 0–41)
ANION GAP SERPL CALC-SCNC: 13 MMOL/L — SIGNIFICANT CHANGE UP (ref 7–14)
AST SERPL-CCNC: 17 U/L — SIGNIFICANT CHANGE UP (ref 0–41)
BASOPHILS # BLD AUTO: 0.01 K/UL — SIGNIFICANT CHANGE UP (ref 0–0.2)
BASOPHILS NFR BLD AUTO: 0.2 % — SIGNIFICANT CHANGE UP (ref 0–1)
BILIRUB SERPL-MCNC: 0.4 MG/DL — SIGNIFICANT CHANGE UP (ref 0.2–1.2)
BUN SERPL-MCNC: 12 MG/DL — SIGNIFICANT CHANGE UP (ref 10–20)
CALCIUM SERPL-MCNC: 8.9 MG/DL — SIGNIFICANT CHANGE UP (ref 8.4–10.5)
CHLORIDE SERPL-SCNC: 99 MMOL/L — SIGNIFICANT CHANGE UP (ref 98–110)
CO2 SERPL-SCNC: 21 MMOL/L — SIGNIFICANT CHANGE UP (ref 17–32)
CREAT SERPL-MCNC: 0.7 MG/DL — SIGNIFICANT CHANGE UP (ref 0.7–1.5)
EGFR: 109 ML/MIN/1.73M2 — SIGNIFICANT CHANGE UP
EGFR: 109 ML/MIN/1.73M2 — SIGNIFICANT CHANGE UP
EOSINOPHIL # BLD AUTO: 0.02 K/UL — SIGNIFICANT CHANGE UP (ref 0–0.7)
EOSINOPHIL NFR BLD AUTO: 0.5 % — SIGNIFICANT CHANGE UP (ref 0–8)
FLUAV AG NPH QL: SIGNIFICANT CHANGE UP
FLUBV AG NPH QL: SIGNIFICANT CHANGE UP
GLUCOSE SERPL-MCNC: 91 MG/DL — SIGNIFICANT CHANGE UP (ref 70–99)
HCT VFR BLD CALC: 37.2 % — SIGNIFICANT CHANGE UP (ref 37–47)
HGB BLD-MCNC: 12.5 G/DL — SIGNIFICANT CHANGE UP (ref 12–16)
IMM GRANULOCYTES NFR BLD AUTO: 0.5 % — HIGH (ref 0.1–0.3)
LYMPHOCYTES # BLD AUTO: 0.77 K/UL — LOW (ref 1.2–3.4)
LYMPHOCYTES # BLD AUTO: 18.1 % — LOW (ref 20.5–51.1)
MCHC RBC-ENTMCNC: 29.8 PG — SIGNIFICANT CHANGE UP (ref 27–31)
MCHC RBC-ENTMCNC: 33.6 G/DL — SIGNIFICANT CHANGE UP (ref 32–37)
MCV RBC AUTO: 88.6 FL — SIGNIFICANT CHANGE UP (ref 81–99)
MONOCYTES # BLD AUTO: 0.33 K/UL — SIGNIFICANT CHANGE UP (ref 0.1–0.6)
MONOCYTES NFR BLD AUTO: 7.7 % — SIGNIFICANT CHANGE UP (ref 1.7–9.3)
NEUTROPHILS # BLD AUTO: 3.11 K/UL — SIGNIFICANT CHANGE UP (ref 1.4–6.5)
NEUTROPHILS NFR BLD AUTO: 73 % — SIGNIFICANT CHANGE UP (ref 42.2–75.2)
NRBC BLD AUTO-RTO: 0 /100 WBCS — SIGNIFICANT CHANGE UP (ref 0–0)
PLATELET # BLD AUTO: 251 K/UL — SIGNIFICANT CHANGE UP (ref 130–400)
PMV BLD: 10.2 FL — SIGNIFICANT CHANGE UP (ref 7.4–10.4)
POTASSIUM SERPL-MCNC: 3.8 MMOL/L — SIGNIFICANT CHANGE UP (ref 3.5–5)
POTASSIUM SERPL-SCNC: 3.8 MMOL/L — SIGNIFICANT CHANGE UP (ref 3.5–5)
PROT SERPL-MCNC: 6.6 G/DL — SIGNIFICANT CHANGE UP (ref 6–8)
RBC # BLD: 4.2 M/UL — SIGNIFICANT CHANGE UP (ref 4.2–5.4)
RBC # FLD: 12.6 % — SIGNIFICANT CHANGE UP (ref 11.5–14.5)
RSV RNA NPH QL NAA+NON-PROBE: SIGNIFICANT CHANGE UP
SARS-COV-2 RNA SPEC QL NAA+PROBE: SIGNIFICANT CHANGE UP
SODIUM SERPL-SCNC: 133 MMOL/L — LOW (ref 135–146)
SOURCE RESPIRATORY: SIGNIFICANT CHANGE UP
WBC # BLD: 4.26 K/UL — LOW (ref 4.8–10.8)
WBC # FLD AUTO: 4.26 K/UL — LOW (ref 4.8–10.8)

## 2025-05-18 PROCEDURE — 80053 COMPREHEN METABOLIC PANEL: CPT

## 2025-05-18 PROCEDURE — 81001 URINALYSIS AUTO W/SCOPE: CPT

## 2025-05-18 PROCEDURE — 85025 COMPLETE CBC W/AUTO DIFF WBC: CPT

## 2025-05-18 PROCEDURE — 99284 EMERGENCY DEPT VISIT MOD MDM: CPT | Mod: 25

## 2025-05-18 PROCEDURE — 36415 COLL VENOUS BLD VENIPUNCTURE: CPT

## 2025-05-18 PROCEDURE — 96372 THER/PROPH/DIAG INJ SC/IM: CPT | Mod: XU

## 2025-05-18 PROCEDURE — 0241U: CPT

## 2025-05-18 PROCEDURE — 99284 EMERGENCY DEPT VISIT MOD MDM: CPT

## 2025-05-18 PROCEDURE — 96375 TX/PRO/DX INJ NEW DRUG ADDON: CPT

## 2025-05-18 PROCEDURE — 96374 THER/PROPH/DIAG INJ IV PUSH: CPT

## 2025-05-18 RX ORDER — ONDANSETRON HCL/PF 4 MG/2 ML
4 VIAL (ML) INJECTION ONCE
Refills: 0 | Status: COMPLETED | OUTPATIENT
Start: 2025-05-18 | End: 2025-05-18

## 2025-05-18 RX ORDER — KETOROLAC TROMETHAMINE 30 MG/ML
30 INJECTION, SOLUTION INTRAMUSCULAR; INTRAVENOUS ONCE
Refills: 0 | Status: DISCONTINUED | OUTPATIENT
Start: 2025-05-18 | End: 2025-05-18

## 2025-05-18 RX ADMIN — Medication 2000 MILLILITER(S): at 22:09

## 2025-05-18 RX ADMIN — Medication 4 MILLIGRAM(S): at 22:03

## 2025-05-18 RX ADMIN — Medication 20 MILLIGRAM(S): at 22:03

## 2025-05-18 RX ADMIN — KETOROLAC TROMETHAMINE 30 MILLIGRAM(S): 30 INJECTION, SOLUTION INTRAMUSCULAR; INTRAVENOUS at 22:04

## 2025-05-18 NOTE — ED PROVIDER NOTE - ATTENDING APP SHARED VISIT CONTRIBUTION OF CARE
45 yo F with hx of TIA, diverticulitis, PE no longer AC who presents with fever, chills, bodyaches, nbnb vomiting, diarrhea, hoarse voice which started this AM. No cough, sore throat, dysuria. No sick contact. Says her teeth feel itchy.    PMD Dr. Gupta    CONSTITUTIONAL: well developed, nontoxic appearing, in no acute distress, speaking in full sentences  SKIN: warm, dry, no rash, cap refill < 2 seconds  HEENT: normocephalic, atraumatic, no conjunctival erythema, moist mucous membranes, patent airway, no tonsillar erythema/swelling, no exudates  NECK: supple  CV:  tachycardic rate, regular rhythm, 2+ radial pulses bilaterally  RESP: no wheezes, no rales, no rhonchi, normal work of breathing  ABD: soft, no tenderness, nondistended, no rebound, no guarding  BACK: no CVA tenderness  MSK: normal ROM, no cyanosis, no edema  NEURO: alert, oriented, grossly unremarkable  PSYCH: cooperative, appropriate    A&P:  Pt here with flu like sx. Here in ED, febrile, appropriately tachycardic. Nontoxic appearing. Plan for labs r/o flu, rsv, covid, electrolyte derangement. Supportive care and reassess.

## 2025-05-18 NOTE — ED PROVIDER NOTE - PATIENT PORTAL LINK FT
You can access the FollowMyHealth Patient Portal offered by [facility] by registering at the following website: [email or web address]. By joining [email or web address]’s FollowMyHealth portal, you will also be able to view your health information using other applications (apps) compatible with our system.

## 2025-05-18 NOTE — ED PROVIDER NOTE - CLINICAL SUMMARY MEDICAL DECISION MAKING FREE TEXT BOX
Pt here with flu like sx. Here in ED, febrile, appropriately tachycardic. Nontoxic appearing. Plan for labs r/o flu, rsv, covid, electrolyte derangement. Supportive care and reassess. Labs wnl. RVP negative. Ua negative. Pt feels better on reassessment and wants to go home. Stable for discharge. Strict ED return precautions given. Pt verbalized understanding and was agreeable with plan.

## 2025-05-18 NOTE — ED PROVIDER NOTE - DIFFERENTIAL DIAGNOSIS
differential dx includes but is not limited to:  flu, rsv, covid, electrolyte derangement Differential Diagnosis

## 2025-05-18 NOTE — ED PROVIDER NOTE - PHYSICAL EXAMINATION
SI----[phone]    Patient called back to the office. He was informed a letter had been mailed but he has not rec'd it yet. Asking for a return call. Vital Signs: I have reviewed the initial vital signs.  Constitutional: appears stated age, no acute distress  Eyes: Sclera clear, EOMI.  Cardiovascular: S1 and S2, regular rate, regular rhythm, well-perfused extremities, radial pulses equal and 2+, pedal pulses 2+ and equal  Respiratory: unlabored respiratory effort, clear to auscultation bilaterally no wheezing, rales, or rhonchi  Gastrointestinal:  abdomen soft, non-tender  Musculoskeletal: supple neck, no lower extremity edema  Integumentary: warm, dry, no rash  Neurologic: awake, alert, oriented x3, extremities’ motor and sensory functions grossly intact

## 2025-05-18 NOTE — ED PROVIDER NOTE - CARE PROVIDER_API CALL
Denisha Babcock  Family Medicine  [address]  [place] [address]  Phone: [phone]  Fax: [phone]  Follow Up Time:

## 2025-05-18 NOTE — ED PROVIDER NOTE - NSFOLLOWUPINSTRUCTIONS_ED_ALL_ED_FT
Viral Illness    Viruses are tiny germs that can get into a person's body and cause illness. There are many different types of viruses, and they cause many types of illness. Viral illnesses can range from mild to severe. They can affect various parts of the body.    Common illnesses that are caused by a virus include colds and the flu. Viral illnesses also include serious conditions such as HIV/AIDS (human immunodeficiency virus/acquired immunodeficiency syndrome). A few viruses have been linked to certain cancers.    What are the causes?  Many types of viruses can cause illness. Viruses invade cells in your body, multiply, and cause the infected cells to malfunction or die. When the cell dies, it releases more of the virus. When this happens, you develop symptoms of the illness, and the virus continues to spread to other cells. If the virus takes over the function of the cell, it can cause the cell to divide and grow out of control, as is the case when a virus causes cancer.    Different viruses get into the body in different ways. You can get a virus by:  - Swallowing food or water that is contaminated with the virus.  - Breathing in droplets that have been coughed or sneezed into the air by an infected person.  - Touching a surface that has been contaminated with the virus and then touching your eyes, nose, or mouth.  - Being bitten by an insect or animal that carries the virus.  - Having sexual contact with a person who is infected with the virus.  - Being exposed to blood or fluids that contain the virus, either through an open cut or during a transfusion.    If a virus enters your body, your body's defense system (immune system) will try to fight the virus. You may be at higher risk for a viral illness if your immune system is weak.    What are the signs or symptoms?  Symptoms vary depending on the type of virus and the location of the cells that it invades. Common symptoms of the main types of viral illnesses include:    - Fever.  - Headache.  - Sore throat.  - Muscle aches.  - Nasal congestion.  - Cough.  - Abdominal pain.  - Nausea and vomiting.  - Diarrhea.  - Loss of appetite.  - Tiredness.  - Yellowing of the skin (jaundice)  - Stiff neck.  - Rash.    How is this treated?  Viruses can be difficult to treat because they live within cells. Antibiotic medicines do not treat viruses because these drugs do not get inside cells. Treatment for a viral illness may include:    Resting and drinking plenty of fluids.  Medicines to relieve symptoms. These can include over-the-counter medicine for pain and fever, medicines for cough or congestion, and medicines to relieve diarrhea.  Antiviral medicines. These drugs are available only for certain types of viruses. They may help reduce flu symptoms if taken early. There are also many antiviral medicines for hepatitis and HIV/AIDS.    Some viral illnesses can be prevented with vaccinations. A common example is the flu shot.    Follow these instructions at home:    Medicines     Take over-the-counter and prescription medicines only as told by your health care provider.  If you were prescribed an antiviral medicine, take it as told by your health care provider. Do not stop taking the medicine even if you start to feel better.  Be aware of when antibiotics are needed and when they are not needed. Antibiotics do not treat viruses. If your health care provider thinks that you may have a bacterial infection as well as a viral infection, you may get an antibiotic.    Do not ask for an antibiotic prescription if you have been diagnosed with a viral illness. That will not make your illness go away faster.  Frequently taking antibiotics when they are not needed can lead to antibiotic resistance. When this develops, the medicine no longer works against the bacteria that it normally fights.    General instructions     Drink enough fluids to keep your urine clear or pale yellow.  Rest as much as possible.  Return to your normal activities as told by your health care provider. Ask your health care provider what activities are safe for you.  Keep all follow-up visits as told by your health care provider. This is important.    How is this prevented?    Take these actions to reduce your risk of viral infection:  Eat a healthy diet and get enough rest.  Wash your hands often with soap and water. This is especially important when you are in public places. If soap and water are not available, use hand .  Avoid close contact with friends and family who have a viral illness.  If you travel to areas where viral gastrointestinal infection is common, avoid drinking water or eating raw food.  Keep your immunizations up to date. Get a flu shot every year as told by your health care provider.  Do not share toothbrushes, nail clippers, razors, or needles with other people.  Always practice safe sex.    Contact a health care provider if:  - You have symptoms of a viral illness that do not go away.  - Your symptoms come back after going away.  - Your symptoms get worse.    Get help right away if:  - You have trouble breathing.  - You have a severe headache or a stiff neck.  - You have severe vomiting or abdominal pain.    This information is not intended to replace advice given to you by your health care provider. Make sure you discuss any questions you have with your health care provider.

## 2025-05-18 NOTE — ED ADULT TRIAGE NOTE - TEMPERATURE IN FAHRENHEIT (DEGREES F)
Refill request from pharmacy for the medication listed below.  Patient was last seen 8/28/20  Next appt transferred to schedule an appt  Refilled per protocol.    Last written as Atorvastatin 10mg 1 tablet po daily #90 no refills          Pharmacy:  [facility]  Call when complete?  no      Drug Prescription Monitoring    Associated Diagnosis for Medication: other hyperlidpemia  Last Dispensed from Pharmacy: 7/28/20  New Refill Start Date Expected:  Next Refill After This Not Until:    
101.2

## 2025-05-19 ENCOUNTER — APPOINTMENT (OUTPATIENT)
Dept: PULMONOLOGY | Facility: CLINIC | Age: 45
End: 2025-05-19
Payer: MEDICAID

## 2025-05-19 VITALS
TEMPERATURE: 98 F | RESPIRATION RATE: 20 BRPM | DIASTOLIC BLOOD PRESSURE: 60 MMHG | HEART RATE: 98 BPM | SYSTOLIC BLOOD PRESSURE: 100 MMHG | OXYGEN SATURATION: 98 %

## 2025-05-19 VITALS
HEIGHT: 66 IN | HEART RATE: 87 BPM | WEIGHT: 206 LBS | RESPIRATION RATE: 14 BRPM | OXYGEN SATURATION: 98 % | SYSTOLIC BLOOD PRESSURE: 102 MMHG | BODY MASS INDEX: 33.11 KG/M2 | DIASTOLIC BLOOD PRESSURE: 68 MMHG

## 2025-05-19 DIAGNOSIS — J45.909 UNSPECIFIED ASTHMA, UNCOMPLICATED: ICD-10-CM

## 2025-05-19 DIAGNOSIS — R06.02 SHORTNESS OF BREATH: ICD-10-CM

## 2025-05-19 DIAGNOSIS — R09.82 POSTNASAL DRIP: ICD-10-CM

## 2025-05-19 LAB
APPEARANCE UR: ABNORMAL
BACTERIA # UR AUTO: ABNORMAL /HPF
BILIRUB UR-MCNC: NEGATIVE — SIGNIFICANT CHANGE UP
COLOR SPEC: YELLOW — SIGNIFICANT CHANGE UP
DIFF PNL FLD: NEGATIVE — SIGNIFICANT CHANGE UP
EPI CELLS # UR: PRESENT
GLUCOSE UR QL: NEGATIVE MG/DL — SIGNIFICANT CHANGE UP
HYALINE CASTS # UR AUTO: SIGNIFICANT CHANGE UP
KETONES UR QL: NEGATIVE MG/DL — SIGNIFICANT CHANGE UP
LEUKOCYTE ESTERASE UR-ACNC: NEGATIVE — SIGNIFICANT CHANGE UP
MUCOUS THREADS # UR AUTO: PRESENT
NITRITE UR-MCNC: NEGATIVE — SIGNIFICANT CHANGE UP
PH UR: 6 — SIGNIFICANT CHANGE UP (ref 5–8)
PROT UR-MCNC: NEGATIVE MG/DL — SIGNIFICANT CHANGE UP
RBC CASTS # UR COMP ASSIST: 1 /HPF — SIGNIFICANT CHANGE UP (ref 0–4)
SP GR SPEC: 1.01 — SIGNIFICANT CHANGE UP (ref 1–1.03)
SQUAMOUS # UR AUTO: SIGNIFICANT CHANGE UP /HPF (ref 0–5)
UROBILINOGEN FLD QL: 1 MG/DL — SIGNIFICANT CHANGE UP (ref 0.2–1)
WBC UR QL: 3 /HPF — SIGNIFICANT CHANGE UP (ref 0–5)

## 2025-05-19 PROCEDURE — G2211 COMPLEX E/M VISIT ADD ON: CPT | Mod: NC

## 2025-05-19 PROCEDURE — 99214 OFFICE O/P EST MOD 30 MIN: CPT

## 2025-05-19 RX ORDER — BUDESONIDE AND FORMOTEROL FUMARATE DIHYDRATE 80; 4.5 UG/1; UG/1
80-4.5 AEROSOL RESPIRATORY (INHALATION)
Qty: 1 | Refills: 3 | Status: ACTIVE | COMMUNITY
Start: 2025-05-19 | End: 1900-01-01

## 2025-05-19 NOTE — ED ADULT NURSE NOTE - COVID-19  TEST TYPE
MOLECULAR PCR
Walking-Indoors allowed/No Heavy lifting/straining/Showering allowed/Walking-Outdoors allowed

## 2025-05-19 NOTE — ED ADULT NURSE NOTE - NSFALLUNIVINTERV_ED_ALL_ED
Bed/Stretcher in lowest position, wheels locked, appropriate side rails in place/Call bell, personal items and telephone in reach/Instruct patient to call for assistance before getting out of bed/chair/stretcher/Non-slip footwear applied when patient is off stretcher/[place] to call system/Physically safe environment - no spills, clutter or unnecessary equipment/Purposeful proactive rounding/Room/bathroom lighting operational, light cord in reach

## 2025-05-19 NOTE — ED ADULT NURSE NOTE - NSICDXPASTMEDICALHX_GEN_ALL_CORE_FT
PAST MEDICAL HISTORY:  COVID-19 x2    Diverticulitis     Pulmonary embolism     TIA (transient ischemic attack)

## 2025-05-20 PROBLEM — G45.9 TRANSIENT CEREBRAL ISCHEMIC ATTACK, UNSPECIFIED: Chronic | Status: ACTIVE | Noted: 2025-05-18

## 2025-05-28 NOTE — ED ADULT NURSE NOTE - NSIMPLEMENTINTERV_GEN_ALL_ED
Medication failed protocol.    Medication: Losartan-hydrochlorothiazide 100-12.5 mg  Last office visit date: 11/22/24-HTN  -losartan/hctz  -cmp done 5/2024  -no chest pain/sob  Assessment and Plan:   BP at goal. Continue on current medication  Next appointment scheduled?: No; Pt to RTC in 6 months   Medication Refill Protocol Failed.  Failed criteria: see below. Sent to clinician to review.       ARB Angiotension Receptor Blocker - Angiotension II Receptor Antagonist Refill Protocol - 12 Month Protocol [id number]/23/2025 09:10 AM   Protocol Details eGFR resulted within last 12 months -- IF CRITERIA FAILED REFER TO PROTOCOL DETAILS    Potassium resulted within last 12 months is within 10% of normal range looking at last value -- IF CRITERIA FAILED REFER TO PROTOCOL DETAILS    Sodium resulted within last 12 months is within 10% of normal range looking at last value -- IF CRITERIA FAILED REFER TO PROTOCOL DETAILS    eGFR greater than 10 resulted within last 12 months looking at last value -- IF CRITERIA FAILED REFER TO PROTOCOL DETAILS         Implemented All Universal Safety Interventions:  [place] to call system. Call bell, personal items and telephone within reach. Instruct patient to call for assistance. Room bathroom lighting operational. Non-slip footwear when patient is off stretcher. Physically safe environment: no spills, clutter or unnecessary equipment. Stretcher in lowest position, wheels locked, appropriate side rails in place.

## 2025-07-16 NOTE — ED ADULT NURSE NOTE - NS ED NURSE IV DC DT
History Of Present Illness  HPI    June 6, 2025  The patient is a 65-year-old female who had a recent abnormal mammogram.  She has not felt any breast lumps and has no breast pain.  No nipple discharge or retraction.  No previous breast biopsy.  Age of menarche was 13.  G2, P2.  Age of first childbirth was 29.  She had a hysterectomy in 2009 for benign disease.  Family history: Mother had breast cancer.  No other cancer runs in the family.     July 18, 2025  65-year-old female with right breast invasive ductal carcinoma, grade 2, with DCIS and foci suspicious for lymphovascular invasion.  ER 91 to 100%, UT 81 to 90%, HER2 negative.  On imaging the tumor is 1.0 cm diameter and is located at the 9 o'clock position 5 cm from the nipple.  Normal axilla by ultrasound.  Clinical T1 N0.  The patient wishes to have right breast Magseed localization lumpectomy with sentinel lymph node biopsy.  She reports that her maternal grandfather had prostate cancer.  She is uncertain whether it was metastatic or high-grade.  A maternal great aunt had breast cancer.     Past medical history:  Hypertension  Osteoarthritis  Hypothyroid  Depression and anxiety  Left shoulder arthroscopy in January 2025  Detached retina  Left midfoot fusion in 2017  Hemithyroidectomy for benign disease    Past Medical History  She has a past medical history of Anxiety (?1995), Arthritis (?2010), Cataract ([id number]), Depression (?1995), GERD (gastroesophageal reflux disease) (history of), Hyperlipidemia (2024), Hypertension (2023), Hypothyroidism, Joint pain, Obesity (?1992), PONV (postoperative nausea and vomiting), Urinary tract infection (history of), Vertigo (history of), and Vision loss (2015?).    Surgical History  She has a past surgical history that includes Hysterectomy (2009); Oophorectomy (2009); Thyroid surgery; Colonoscopy; Foot surgery; and Breast biopsy (N/A, 1976).     Allergies  Patient has no known allergies.    Social History  She reports  "that she has never smoked. She has never used smokeless tobacco. She reports that she does not currently use alcohol. She reports that she does not use drugs.    Family History  Family History[1]    Last Recorded Vitals  Blood pressure (!) 136/94, pulse 91, temperature 36.5 °C (97.7 °F), resp. rate 18, height 1.575 m (5' 2\"), weight 84.8 kg (187 lb), SpO2 98%.    Physical Exam   Constitutional: Well-developed, well-nourished, alert and oriented, no acute distress  Skin: Warm and dry, no lesions, no rashes, no jaundice  HEENT: Normocephalic, atraumatic, EOMI, no scleral icterus, mucous membranes moist, no lesions seen  Neck: Soft, nontender, no mass or adenopathy  Cardiac: Regular rate and rhythm, no murmur  Chest: Patent airway, clear to auscultation, normal breath sounds with good chest expansion, no wheezes or rales or rhonchi noted, thorax symmetric  Breast:     right breast: Mild ecchymosis laterally, no other skin changes, nontender, no mass    left breast: No skin changes, nontender, no mass, mild fibrocystic change  Abdomen: Nondistended, soft, nontender, no mass  Rectal: Not performed  Extremities: No injury, no lower extremity edema or calf tenderness  Lymphatic: No cervical or axillary adenopathy  Musculoskeletal: Range of motion intact, no joint swelling, normal strength  Neurological: Alert and oriented x3, intact sensory and motor function, no obvious focal neurologic abnormalities  Psychological: Appropriate mood and behavior  Examination chaperoned by Sara    Relevant Results       Assessment/Plan   Diagnoses and all orders for this visit:  Infiltrating ductal carcinoma of right breast    65-year-old female with right breast invasive ductal carcinoma, grade 2, with DCIS and foci suspicious for lymphovascular invasion.  ER 91 to 100%, MO 81 to 90%, HER2 negative.  On imaging the tumor is 1.0 cm diameter and is located at the 9 o'clock position 5 cm from the nipple.  Normal axilla by " ultrasound.  Clinical T1 N0.  I have discussed the results and treatment options with the patient and her .  I answered multiple questions.  I have discussed mastectomy versus lumpectomy with radiation. I also discussed sentinel lymph node biopsy and axillary lymph node dissection. I discussed the procedures and risks with the patient. The risks include bleeding, infection, injury to surrounding structures such as nerves/blood vessels, cardiac/pulmonary complications, chronic lymphedema. I discussed the possibility of requiring a second operation following lumpectomy or sentinel lymph node biopsy due to positive margins or positive lymph nodes found on the final pathology report. I discussed the possibility of breast reconstruction and offered to refer the patient to a plastic surgeon.    She had questions regarding BRCA testing and I told her that I could refer her to a genetic counselor for discussion if she desires.     The patient wishes to have Magseed localization lumpectomy with sentinel lymph node biopsy.  Electronic consent completed    Constantino Suarez MD         [1]   Family History  Problem Relation Name Age of Onset    Breast cancer Mother Eleanor     Brain Aneurysm Father      Cancer Mother Eleanor 62    Stroke Father Kyle     Hyperlipidemia Father [place]     Arthritis Father [place]     Brain Aneurysm Father [place]     Hypertension Brother three brothers     Stroke Father Ed     Hyperlipidemia Father Ed     Arthritis Father Ed     Breast cancer Mother Eleanor     Miscarriages / Stillbirths Daughter Pema       30-Jul-2022 23:53